# Patient Record
Sex: FEMALE | Race: WHITE | NOT HISPANIC OR LATINO | URBAN - METROPOLITAN AREA
[De-identification: names, ages, dates, MRNs, and addresses within clinical notes are randomized per-mention and may not be internally consistent; named-entity substitution may affect disease eponyms.]

---

## 2017-03-24 ENCOUNTER — OUTPATIENT (OUTPATIENT)
Dept: OUTPATIENT SERVICES | Facility: HOSPITAL | Age: 72
LOS: 1 days | End: 2017-03-24
Payer: MEDICARE

## 2017-03-24 ENCOUNTER — APPOINTMENT (OUTPATIENT)
Dept: RADIOLOGY | Facility: HOSPITAL | Age: 72
End: 2017-03-24

## 2017-03-24 DIAGNOSIS — M72.2 PLANTAR FASCIAL FIBROMATOSIS: Chronic | ICD-10-CM

## 2017-03-24 DIAGNOSIS — Z85.828 PERSONAL HISTORY OF OTHER MALIGNANT NEOPLASM OF SKIN: Chronic | ICD-10-CM

## 2017-03-24 PROCEDURE — 73110 X-RAY EXAM OF WRIST: CPT

## 2017-03-24 PROCEDURE — 70200 X-RAY EXAM OF EYE SOCKETS: CPT | Mod: 26

## 2017-03-24 PROCEDURE — 70200 X-RAY EXAM OF EYE SOCKETS: CPT

## 2017-03-24 PROCEDURE — 73110 X-RAY EXAM OF WRIST: CPT | Mod: 26,RT

## 2020-01-01 ENCOUNTER — INPATIENT (INPATIENT)
Facility: HOSPITAL | Age: 75
LOS: 6 days | Discharge: NURSING, REHAB OR LONG-TERM CARE | DRG: 308 | End: 2020-09-25
Attending: INTERNAL MEDICINE | Admitting: INTERNAL MEDICINE
Payer: MEDICARE

## 2020-01-01 ENCOUNTER — TRANSCRIPTION ENCOUNTER (OUTPATIENT)
Age: 75
End: 2020-01-01

## 2020-01-01 VITALS
DIASTOLIC BLOOD PRESSURE: 68 MMHG | OXYGEN SATURATION: 98 % | HEART RATE: 88 BPM | RESPIRATION RATE: 18 BRPM | SYSTOLIC BLOOD PRESSURE: 110 MMHG | TEMPERATURE: 98 F

## 2020-01-01 VITALS
HEART RATE: 121 BPM | RESPIRATION RATE: 18 BRPM | TEMPERATURE: 98 F | OXYGEN SATURATION: 96 % | DIASTOLIC BLOOD PRESSURE: 72 MMHG | SYSTOLIC BLOOD PRESSURE: 101 MMHG | HEIGHT: 65 IN

## 2020-01-01 DIAGNOSIS — M72.2 PLANTAR FASCIAL FIBROMATOSIS: Chronic | ICD-10-CM

## 2020-01-01 DIAGNOSIS — R41.82 ALTERED MENTAL STATUS, UNSPECIFIED: ICD-10-CM

## 2020-01-01 DIAGNOSIS — E87.2 ACIDOSIS: ICD-10-CM

## 2020-01-01 DIAGNOSIS — Z85.828 PERSONAL HISTORY OF OTHER MALIGNANT NEOPLASM OF SKIN: Chronic | ICD-10-CM

## 2020-01-01 DIAGNOSIS — I48.91 UNSPECIFIED ATRIAL FIBRILLATION: ICD-10-CM

## 2020-01-01 DIAGNOSIS — N17.9 ACUTE KIDNEY FAILURE, UNSPECIFIED: ICD-10-CM

## 2020-01-01 DIAGNOSIS — E87.0 HYPEROSMOLALITY AND HYPERNATREMIA: ICD-10-CM

## 2020-01-01 LAB
ALBUMIN SERPL ELPH-MCNC: 3.5 G/DL — SIGNIFICANT CHANGE UP (ref 3.3–5)
ALP SERPL-CCNC: 74 U/L — SIGNIFICANT CHANGE UP (ref 40–120)
ALT FLD-CCNC: 19 U/L — SIGNIFICANT CHANGE UP (ref 10–45)
ANION GAP SERPL CALC-SCNC: 10 MMOL/L — SIGNIFICANT CHANGE UP (ref 5–17)
ANION GAP SERPL CALC-SCNC: 10 MMOL/L — SIGNIFICANT CHANGE UP (ref 5–17)
ANION GAP SERPL CALC-SCNC: 11 MMOL/L — SIGNIFICANT CHANGE UP (ref 5–17)
ANION GAP SERPL CALC-SCNC: 12 MMOL/L — SIGNIFICANT CHANGE UP (ref 5–17)
ANION GAP SERPL CALC-SCNC: 13 MMOL/L — SIGNIFICANT CHANGE UP (ref 5–17)
ANION GAP SERPL CALC-SCNC: 19 MMOL/L — HIGH (ref 5–17)
ANION GAP SERPL CALC-SCNC: 20 MMOL/L — HIGH (ref 5–17)
ANION GAP SERPL CALC-SCNC: 20 MMOL/L — HIGH (ref 5–17)
ANION GAP SERPL CALC-SCNC: 6 MMOL/L — SIGNIFICANT CHANGE UP (ref 5–17)
ANION GAP SERPL CALC-SCNC: 7 MMOL/L — SIGNIFICANT CHANGE UP (ref 5–17)
ANION GAP SERPL CALC-SCNC: 7 MMOL/L — SIGNIFICANT CHANGE UP (ref 5–17)
ANION GAP SERPL CALC-SCNC: 8 MMOL/L — SIGNIFICANT CHANGE UP (ref 5–17)
APPEARANCE UR: CLEAR — SIGNIFICANT CHANGE UP
APPEARANCE UR: CLEAR — SIGNIFICANT CHANGE UP
APTT BLD: 102 SEC — HIGH (ref 27.5–35.5)
APTT BLD: 119.5 SEC — HIGH (ref 27.5–35.5)
APTT BLD: 128 SEC — CRITICAL HIGH (ref 27.5–35.5)
APTT BLD: 20.1 SEC — LOW (ref 27.5–35.5)
APTT BLD: 24.6 SEC — LOW (ref 27.5–35.5)
APTT BLD: 26.4 SEC — LOW (ref 27.5–35.5)
APTT BLD: 60.1 SEC — HIGH (ref 27.5–35.5)
APTT BLD: 78.2 SEC — HIGH (ref 27.5–35.5)
APTT BLD: 81.6 SEC — HIGH (ref 27.5–35.5)
APTT BLD: 87.7 SEC — HIGH (ref 27.5–35.5)
AST SERPL-CCNC: 25 U/L — SIGNIFICANT CHANGE UP (ref 10–40)
BASOPHILS # BLD AUTO: 0.03 K/UL — SIGNIFICANT CHANGE UP (ref 0–0.2)
BASOPHILS NFR BLD AUTO: 0.2 % — SIGNIFICANT CHANGE UP (ref 0–2)
BILIRUB SERPL-MCNC: 0.4 MG/DL — SIGNIFICANT CHANGE UP (ref 0.2–1.2)
BILIRUB UR-MCNC: ABNORMAL
BILIRUB UR-MCNC: NEGATIVE — SIGNIFICANT CHANGE UP
BUN SERPL-MCNC: 103 MG/DL — HIGH (ref 7–23)
BUN SERPL-MCNC: 15 MG/DL — SIGNIFICANT CHANGE UP (ref 7–23)
BUN SERPL-MCNC: 16 MG/DL — SIGNIFICANT CHANGE UP (ref 7–23)
BUN SERPL-MCNC: 16 MG/DL — SIGNIFICANT CHANGE UP (ref 7–23)
BUN SERPL-MCNC: 26 MG/DL — HIGH (ref 7–23)
BUN SERPL-MCNC: 50 MG/DL — HIGH (ref 7–23)
BUN SERPL-MCNC: 75 MG/DL — HIGH (ref 7–23)
BUN SERPL-MCNC: 80 MG/DL — HIGH (ref 7–23)
BUN SERPL-MCNC: 85 MG/DL — HIGH (ref 7–23)
BUN SERPL-MCNC: 91 MG/DL — HIGH (ref 7–23)
BUN SERPL-MCNC: 96 MG/DL — HIGH (ref 7–23)
BUN SERPL-MCNC: 99 MG/DL — HIGH (ref 7–23)
CALCIUM SERPL-MCNC: 10 MG/DL — SIGNIFICANT CHANGE UP (ref 8.4–10.5)
CALCIUM SERPL-MCNC: 10.4 MG/DL — SIGNIFICANT CHANGE UP (ref 8.4–10.5)
CALCIUM SERPL-MCNC: 10.5 MG/DL — SIGNIFICANT CHANGE UP (ref 8.4–10.5)
CALCIUM SERPL-MCNC: 7.7 MG/DL — LOW (ref 8.4–10.5)
CALCIUM SERPL-MCNC: 8.5 MG/DL — SIGNIFICANT CHANGE UP (ref 8.4–10.5)
CALCIUM SERPL-MCNC: 8.7 MG/DL — SIGNIFICANT CHANGE UP (ref 8.4–10.5)
CALCIUM SERPL-MCNC: 8.8 MG/DL — SIGNIFICANT CHANGE UP (ref 8.4–10.5)
CALCIUM SERPL-MCNC: 9.1 MG/DL — SIGNIFICANT CHANGE UP (ref 8.4–10.5)
CALCIUM SERPL-MCNC: 9.2 MG/DL — SIGNIFICANT CHANGE UP (ref 8.4–10.5)
CALCIUM SERPL-MCNC: 9.3 MG/DL — SIGNIFICANT CHANGE UP (ref 8.4–10.5)
CALCIUM SERPL-MCNC: 9.4 MG/DL — SIGNIFICANT CHANGE UP (ref 8.4–10.5)
CALCIUM SERPL-MCNC: 9.6 MG/DL — SIGNIFICANT CHANGE UP (ref 8.4–10.5)
CHLORIDE SERPL-SCNC: 110 MMOL/L — HIGH (ref 96–108)
CHLORIDE SERPL-SCNC: 113 MMOL/L — HIGH (ref 96–108)
CHLORIDE SERPL-SCNC: 113 MMOL/L — HIGH (ref 96–108)
CHLORIDE SERPL-SCNC: 114 MMOL/L — HIGH (ref 96–108)
CHLORIDE SERPL-SCNC: 116 MMOL/L — HIGH (ref 96–108)
CHLORIDE SERPL-SCNC: 119 MMOL/L — HIGH (ref 96–108)
CHLORIDE SERPL-SCNC: 119 MMOL/L — HIGH (ref 96–108)
CHLORIDE SERPL-SCNC: 123 MMOL/L — HIGH (ref 96–108)
CHLORIDE SERPL-SCNC: 123 MMOL/L — HIGH (ref 96–108)
CHLORIDE SERPL-SCNC: 124 MMOL/L — HIGH (ref 96–108)
CHLORIDE SERPL-SCNC: 126 MMOL/L — HIGH (ref 96–108)
CHLORIDE SERPL-SCNC: 96 MMOL/L — SIGNIFICANT CHANGE UP (ref 96–108)
CK SERPL-CCNC: 169 U/L — SIGNIFICANT CHANGE UP (ref 25–170)
CK SERPL-CCNC: 334 U/L — HIGH (ref 25–170)
CK SERPL-CCNC: 87 U/L — SIGNIFICANT CHANGE UP (ref 25–170)
CO2 SERPL-SCNC: 10 MMOL/L — CRITICAL LOW (ref 22–31)
CO2 SERPL-SCNC: 14 MMOL/L — LOW (ref 22–31)
CO2 SERPL-SCNC: 16 MMOL/L — LOW (ref 22–31)
CO2 SERPL-SCNC: 20 MMOL/L — LOW (ref 22–31)
CO2 SERPL-SCNC: 22 MMOL/L — SIGNIFICANT CHANGE UP (ref 22–31)
CO2 SERPL-SCNC: 23 MMOL/L — SIGNIFICANT CHANGE UP (ref 22–31)
CO2 SERPL-SCNC: 23 MMOL/L — SIGNIFICANT CHANGE UP (ref 22–31)
CO2 SERPL-SCNC: 24 MMOL/L — SIGNIFICANT CHANGE UP (ref 22–31)
CO2 SERPL-SCNC: 26 MMOL/L — SIGNIFICANT CHANGE UP (ref 22–31)
CO2 SERPL-SCNC: 38 MMOL/L — HIGH (ref 22–31)
COLOR SPEC: SIGNIFICANT CHANGE UP
COLOR SPEC: YELLOW — SIGNIFICANT CHANGE UP
CREAT ?TM UR-MCNC: 78 MG/DL — SIGNIFICANT CHANGE UP
CREAT SERPL-MCNC: 1.31 MG/DL — HIGH (ref 0.5–1.3)
CREAT SERPL-MCNC: 1.36 MG/DL — HIGH (ref 0.5–1.3)
CREAT SERPL-MCNC: 1.38 MG/DL — HIGH (ref 0.5–1.3)
CREAT SERPL-MCNC: 1.49 MG/DL — HIGH (ref 0.5–1.3)
CREAT SERPL-MCNC: 1.85 MG/DL — HIGH (ref 0.5–1.3)
CREAT SERPL-MCNC: 2.46 MG/DL — HIGH (ref 0.5–1.3)
CREAT SERPL-MCNC: 2.51 MG/DL — HIGH (ref 0.5–1.3)
CREAT SERPL-MCNC: 2.81 MG/DL — HIGH (ref 0.5–1.3)
CREAT SERPL-MCNC: 2.83 MG/DL — HIGH (ref 0.5–1.3)
CREAT SERPL-MCNC: 3.18 MG/DL — HIGH (ref 0.5–1.3)
CREAT SERPL-MCNC: 3.7 MG/DL — HIGH (ref 0.5–1.3)
CREAT SERPL-MCNC: 3.79 MG/DL — HIGH (ref 0.5–1.3)
CULTURE RESULTS: SIGNIFICANT CHANGE UP
DIFF PNL FLD: NEGATIVE — SIGNIFICANT CHANGE UP
DIFF PNL FLD: SIGNIFICANT CHANGE UP
EOSINOPHIL # BLD AUTO: 0.09 K/UL — SIGNIFICANT CHANGE UP (ref 0–0.5)
EOSINOPHIL NFR BLD AUTO: 0.6 % — SIGNIFICANT CHANGE UP (ref 0–6)
FOLATE SERPL-MCNC: 8.5 NG/ML — SIGNIFICANT CHANGE UP
GAS PNL BLDV: SIGNIFICANT CHANGE UP
GAS PNL BLDV: SIGNIFICANT CHANGE UP
GLUCOSE BLDC GLUCOMTR-MCNC: 105 MG/DL — HIGH (ref 70–99)
GLUCOSE SERPL-MCNC: 106 MG/DL — HIGH (ref 70–99)
GLUCOSE SERPL-MCNC: 110 MG/DL — HIGH (ref 70–99)
GLUCOSE SERPL-MCNC: 113 MG/DL — HIGH (ref 70–99)
GLUCOSE SERPL-MCNC: 113 MG/DL — HIGH (ref 70–99)
GLUCOSE SERPL-MCNC: 122 MG/DL — HIGH (ref 70–99)
GLUCOSE SERPL-MCNC: 122 MG/DL — HIGH (ref 70–99)
GLUCOSE SERPL-MCNC: 138 MG/DL — HIGH (ref 70–99)
GLUCOSE SERPL-MCNC: 144 MG/DL — HIGH (ref 70–99)
GLUCOSE SERPL-MCNC: 145 MG/DL — HIGH (ref 70–99)
GLUCOSE SERPL-MCNC: 149 MG/DL — HIGH (ref 70–99)
GLUCOSE SERPL-MCNC: 223 MG/DL — HIGH (ref 70–99)
GLUCOSE SERPL-MCNC: 980 MG/DL — CRITICAL HIGH (ref 70–99)
GLUCOSE UR QL: NEGATIVE — SIGNIFICANT CHANGE UP
GLUCOSE UR QL: NEGATIVE — SIGNIFICANT CHANGE UP
HCT VFR BLD CALC: 27.7 % — LOW (ref 34.5–45)
HCT VFR BLD CALC: 28.1 % — LOW (ref 34.5–45)
HCT VFR BLD CALC: 28.2 % — LOW (ref 34.5–45)
HCT VFR BLD CALC: 28.6 % — LOW (ref 34.5–45)
HCT VFR BLD CALC: 31 % — LOW (ref 34.5–45)
HCT VFR BLD CALC: 34.2 % — LOW (ref 34.5–45)
HCT VFR BLD CALC: 34.6 % — SIGNIFICANT CHANGE UP (ref 34.5–45)
HCT VFR BLD CALC: 36.6 % — SIGNIFICANT CHANGE UP (ref 34.5–45)
HCT VFR BLD CALC: 41.6 % — SIGNIFICANT CHANGE UP (ref 34.5–45)
HCV AB S/CO SERPL IA: 0.08 S/CO — SIGNIFICANT CHANGE UP (ref 0–0.99)
HCV AB SERPL-IMP: SIGNIFICANT CHANGE UP
HGB BLD-MCNC: 10.2 G/DL — LOW (ref 11.5–15.5)
HGB BLD-MCNC: 10.5 G/DL — LOW (ref 11.5–15.5)
HGB BLD-MCNC: 10.8 G/DL — LOW (ref 11.5–15.5)
HGB BLD-MCNC: 12.5 G/DL — SIGNIFICANT CHANGE UP (ref 11.5–15.5)
HGB BLD-MCNC: 8.4 G/DL — LOW (ref 11.5–15.5)
HGB BLD-MCNC: 8.5 G/DL — LOW (ref 11.5–15.5)
HGB BLD-MCNC: 8.6 G/DL — LOW (ref 11.5–15.5)
HGB BLD-MCNC: 8.8 G/DL — LOW (ref 11.5–15.5)
HGB BLD-MCNC: 9.4 G/DL — LOW (ref 11.5–15.5)
IMM GRANULOCYTES NFR BLD AUTO: 0.6 % — SIGNIFICANT CHANGE UP (ref 0–1.5)
INR BLD: 1.22 RATIO — HIGH (ref 0.88–1.16)
KETONES UR-MCNC: NEGATIVE — SIGNIFICANT CHANGE UP
KETONES UR-MCNC: NEGATIVE — SIGNIFICANT CHANGE UP
LACTATE BLDV-MCNC: 2.9 MMOL/L — HIGH (ref 0.7–2)
LEUKOCYTE ESTERASE UR-ACNC: ABNORMAL
LEUKOCYTE ESTERASE UR-ACNC: NEGATIVE — SIGNIFICANT CHANGE UP
LITHIUM SERPL-MCNC: 0.3 MMOL/L — LOW (ref 0.6–1.2)
LYMPHOCYTES # BLD AUTO: 0.91 K/UL — LOW (ref 1–3.3)
LYMPHOCYTES # BLD AUTO: 6.3 % — LOW (ref 13–44)
MAGNESIUM SERPL-MCNC: 1.8 MG/DL — SIGNIFICANT CHANGE UP (ref 1.6–2.6)
MAGNESIUM SERPL-MCNC: 1.9 MG/DL — SIGNIFICANT CHANGE UP (ref 1.6–2.6)
MAGNESIUM SERPL-MCNC: 2 MG/DL — SIGNIFICANT CHANGE UP (ref 1.6–2.6)
MAGNESIUM SERPL-MCNC: 2.2 MG/DL — SIGNIFICANT CHANGE UP (ref 1.6–2.6)
MAGNESIUM SERPL-MCNC: 2.3 MG/DL — SIGNIFICANT CHANGE UP (ref 1.6–2.6)
MCHC RBC-ENTMCNC: 28.9 PG — SIGNIFICANT CHANGE UP (ref 27–34)
MCHC RBC-ENTMCNC: 29.1 PG — SIGNIFICANT CHANGE UP (ref 27–34)
MCHC RBC-ENTMCNC: 29.2 PG — SIGNIFICANT CHANGE UP (ref 27–34)
MCHC RBC-ENTMCNC: 29.5 GM/DL — LOW (ref 32–36)
MCHC RBC-ENTMCNC: 29.5 PG — SIGNIFICANT CHANGE UP (ref 27–34)
MCHC RBC-ENTMCNC: 29.6 PG — SIGNIFICANT CHANGE UP (ref 27–34)
MCHC RBC-ENTMCNC: 29.7 PG — SIGNIFICANT CHANGE UP (ref 27–34)
MCHC RBC-ENTMCNC: 29.7 PG — SIGNIFICANT CHANGE UP (ref 27–34)
MCHC RBC-ENTMCNC: 29.8 GM/DL — LOW (ref 32–36)
MCHC RBC-ENTMCNC: 29.9 PG — SIGNIFICANT CHANGE UP (ref 27–34)
MCHC RBC-ENTMCNC: 30 GM/DL — LOW (ref 32–36)
MCHC RBC-ENTMCNC: 30.1 GM/DL — LOW (ref 32–36)
MCHC RBC-ENTMCNC: 30.1 PG — SIGNIFICANT CHANGE UP (ref 27–34)
MCHC RBC-ENTMCNC: 30.3 GM/DL — LOW (ref 32–36)
MCHC RBC-ENTMCNC: 30.6 GM/DL — LOW (ref 32–36)
MCHC RBC-ENTMCNC: 30.8 GM/DL — LOW (ref 32–36)
MCV RBC AUTO: 96.9 FL — SIGNIFICANT CHANGE UP (ref 80–100)
MCV RBC AUTO: 97 FL — SIGNIFICANT CHANGE UP (ref 80–100)
MCV RBC AUTO: 97.2 FL — SIGNIFICANT CHANGE UP (ref 80–100)
MCV RBC AUTO: 97.5 FL — SIGNIFICANT CHANGE UP (ref 80–100)
MCV RBC AUTO: 97.6 FL — SIGNIFICANT CHANGE UP (ref 80–100)
MCV RBC AUTO: 97.9 FL — SIGNIFICANT CHANGE UP (ref 80–100)
MCV RBC AUTO: 97.9 FL — SIGNIFICANT CHANGE UP (ref 80–100)
MCV RBC AUTO: 98.1 FL — SIGNIFICANT CHANGE UP (ref 80–100)
MCV RBC AUTO: 99.4 FL — SIGNIFICANT CHANGE UP (ref 80–100)
MONOCYTES # BLD AUTO: 1.14 K/UL — HIGH (ref 0–0.9)
MONOCYTES NFR BLD AUTO: 7.9 % — SIGNIFICANT CHANGE UP (ref 2–14)
NEUTROPHILS # BLD AUTO: 12.2 K/UL — HIGH (ref 1.8–7.4)
NEUTROPHILS NFR BLD AUTO: 84.4 % — HIGH (ref 43–77)
NITRITE UR-MCNC: NEGATIVE — SIGNIFICANT CHANGE UP
NITRITE UR-MCNC: NEGATIVE — SIGNIFICANT CHANGE UP
NRBC # BLD: 0 /100 WBCS — SIGNIFICANT CHANGE UP (ref 0–0)
OSMOLALITY SERPL: 374 MOSMOL/KG — HIGH (ref 280–301)
OSMOLALITY UR: 350 MOSM/KG — SIGNIFICANT CHANGE UP (ref 50–1200)
PH UR: 5.5 — SIGNIFICANT CHANGE UP (ref 5–8)
PH UR: 6 — SIGNIFICANT CHANGE UP (ref 5–8)
PHOSPHATE SERPL-MCNC: 1.1 MG/DL — LOW (ref 2.5–4.5)
PHOSPHATE SERPL-MCNC: 2.4 MG/DL — LOW (ref 2.5–4.5)
PHOSPHATE SERPL-MCNC: 2.5 MG/DL — SIGNIFICANT CHANGE UP (ref 2.5–4.5)
PLATELET # BLD AUTO: 105 K/UL — LOW (ref 150–400)
PLATELET # BLD AUTO: 115 K/UL — LOW (ref 150–400)
PLATELET # BLD AUTO: 116 K/UL — LOW (ref 150–400)
PLATELET # BLD AUTO: 120 K/UL — LOW (ref 150–400)
PLATELET # BLD AUTO: 121 K/UL — LOW (ref 150–400)
PLATELET # BLD AUTO: 128 K/UL — LOW (ref 150–400)
PLATELET # BLD AUTO: 153 K/UL — SIGNIFICANT CHANGE UP (ref 150–400)
PLATELET # BLD AUTO: 183 K/UL — SIGNIFICANT CHANGE UP (ref 150–400)
PLATELET # BLD AUTO: 242 K/UL — SIGNIFICANT CHANGE UP (ref 150–400)
POTASSIUM SERPL-MCNC: 2.4 MMOL/L — CRITICAL LOW (ref 3.5–5.3)
POTASSIUM SERPL-MCNC: 3.2 MMOL/L — LOW (ref 3.5–5.3)
POTASSIUM SERPL-MCNC: 3.3 MMOL/L — LOW (ref 3.5–5.3)
POTASSIUM SERPL-MCNC: 3.3 MMOL/L — LOW (ref 3.5–5.3)
POTASSIUM SERPL-MCNC: 3.5 MMOL/L — SIGNIFICANT CHANGE UP (ref 3.5–5.3)
POTASSIUM SERPL-MCNC: 3.6 MMOL/L — SIGNIFICANT CHANGE UP (ref 3.5–5.3)
POTASSIUM SERPL-MCNC: 3.7 MMOL/L — SIGNIFICANT CHANGE UP (ref 3.5–5.3)
POTASSIUM SERPL-MCNC: 3.7 MMOL/L — SIGNIFICANT CHANGE UP (ref 3.5–5.3)
POTASSIUM SERPL-MCNC: 3.9 MMOL/L — SIGNIFICANT CHANGE UP (ref 3.5–5.3)
POTASSIUM SERPL-MCNC: 5 MMOL/L — SIGNIFICANT CHANGE UP (ref 3.5–5.3)
POTASSIUM SERPL-MCNC: 5.2 MMOL/L — SIGNIFICANT CHANGE UP (ref 3.5–5.3)
POTASSIUM SERPL-MCNC: 5.3 MMOL/L — SIGNIFICANT CHANGE UP (ref 3.5–5.3)
POTASSIUM SERPL-SCNC: 2.4 MMOL/L — CRITICAL LOW (ref 3.5–5.3)
POTASSIUM SERPL-SCNC: 3.2 MMOL/L — LOW (ref 3.5–5.3)
POTASSIUM SERPL-SCNC: 3.3 MMOL/L — LOW (ref 3.5–5.3)
POTASSIUM SERPL-SCNC: 3.3 MMOL/L — LOW (ref 3.5–5.3)
POTASSIUM SERPL-SCNC: 3.5 MMOL/L — SIGNIFICANT CHANGE UP (ref 3.5–5.3)
POTASSIUM SERPL-SCNC: 3.6 MMOL/L — SIGNIFICANT CHANGE UP (ref 3.5–5.3)
POTASSIUM SERPL-SCNC: 3.7 MMOL/L — SIGNIFICANT CHANGE UP (ref 3.5–5.3)
POTASSIUM SERPL-SCNC: 3.7 MMOL/L — SIGNIFICANT CHANGE UP (ref 3.5–5.3)
POTASSIUM SERPL-SCNC: 3.9 MMOL/L — SIGNIFICANT CHANGE UP (ref 3.5–5.3)
POTASSIUM SERPL-SCNC: 5 MMOL/L — SIGNIFICANT CHANGE UP (ref 3.5–5.3)
POTASSIUM SERPL-SCNC: 5.2 MMOL/L — SIGNIFICANT CHANGE UP (ref 3.5–5.3)
POTASSIUM SERPL-SCNC: 5.3 MMOL/L — SIGNIFICANT CHANGE UP (ref 3.5–5.3)
POTASSIUM UR-SCNC: 28 MMOL/L — SIGNIFICANT CHANGE UP
PROT ?TM UR-MCNC: 15 MG/DL — HIGH (ref 0–12)
PROT SERPL-MCNC: 6.7 G/DL — SIGNIFICANT CHANGE UP (ref 6–8.3)
PROT UR-MCNC: ABNORMAL
PROT UR-MCNC: SIGNIFICANT CHANGE UP
PROT/CREAT UR-RTO: 0.2 RATIO — SIGNIFICANT CHANGE UP (ref 0–0.2)
PROTHROM AB SERPL-ACNC: 14.4 SEC — HIGH (ref 10.6–13.6)
RAPID RVP RESULT: SIGNIFICANT CHANGE UP
RBC # BLD: 2.84 M/UL — LOW (ref 3.8–5.2)
RBC # BLD: 2.88 M/UL — LOW (ref 3.8–5.2)
RBC # BLD: 2.91 M/UL — LOW (ref 3.8–5.2)
RBC # BLD: 2.92 M/UL — LOW (ref 3.8–5.2)
RBC # BLD: 3.16 M/UL — LOW (ref 3.8–5.2)
RBC # BLD: 3.44 M/UL — LOW (ref 3.8–5.2)
RBC # BLD: 3.56 M/UL — LOW (ref 3.8–5.2)
RBC # BLD: 3.74 M/UL — LOW (ref 3.8–5.2)
RBC # BLD: 4.29 M/UL — SIGNIFICANT CHANGE UP (ref 3.8–5.2)
RBC # FLD: 14.3 % — SIGNIFICANT CHANGE UP (ref 10.3–14.5)
RBC # FLD: 14.5 % — SIGNIFICANT CHANGE UP (ref 10.3–14.5)
RBC # FLD: 14.6 % — HIGH (ref 10.3–14.5)
RBC # FLD: 14.7 % — HIGH (ref 10.3–14.5)
RBC # FLD: 14.8 % — HIGH (ref 10.3–14.5)
RBC # FLD: 14.8 % — HIGH (ref 10.3–14.5)
RBC # FLD: 14.9 % — HIGH (ref 10.3–14.5)
RBC # FLD: 15 % — HIGH (ref 10.3–14.5)
RBC # FLD: 15.1 % — HIGH (ref 10.3–14.5)
SARS-COV-2 IGG SERPL QL IA: NEGATIVE — SIGNIFICANT CHANGE UP
SARS-COV-2 IGM SERPL IA-ACNC: <0.1 INDEX — SIGNIFICANT CHANGE UP
SARS-COV-2 RNA SPEC QL NAA+PROBE: SIGNIFICANT CHANGE UP
SODIUM SERPL-SCNC: 140 MMOL/L — SIGNIFICANT CHANGE UP (ref 135–145)
SODIUM SERPL-SCNC: 143 MMOL/L — SIGNIFICANT CHANGE UP (ref 135–145)
SODIUM SERPL-SCNC: 143 MMOL/L — SIGNIFICANT CHANGE UP (ref 135–145)
SODIUM SERPL-SCNC: 147 MMOL/L — HIGH (ref 135–145)
SODIUM SERPL-SCNC: 149 MMOL/L — HIGH (ref 135–145)
SODIUM SERPL-SCNC: 149 MMOL/L — HIGH (ref 135–145)
SODIUM SERPL-SCNC: 150 MMOL/L — HIGH (ref 135–145)
SODIUM SERPL-SCNC: 151 MMOL/L — HIGH (ref 135–145)
SODIUM SERPL-SCNC: 154 MMOL/L — HIGH (ref 135–145)
SODIUM SERPL-SCNC: 158 MMOL/L — HIGH (ref 135–145)
SODIUM SERPL-SCNC: 159 MMOL/L — HIGH (ref 135–145)
SODIUM SERPL-SCNC: 160 MMOL/L — CRITICAL HIGH (ref 135–145)
SODIUM UR-SCNC: <35 MMOL/L — SIGNIFICANT CHANGE UP
SP GR SPEC: 1.01 — SIGNIFICANT CHANGE UP (ref 1.01–1.02)
SP GR SPEC: 1.02 — SIGNIFICANT CHANGE UP (ref 1.01–1.02)
SPECIMEN SOURCE: SIGNIFICANT CHANGE UP
TROPONIN T, HIGH SENSITIVITY RESULT: 101 NG/L — HIGH (ref 0–51)
TSH SERPL-MCNC: 1.5 UIU/ML — SIGNIFICANT CHANGE UP (ref 0.27–4.2)
UROBILINOGEN FLD QL: ABNORMAL
UROBILINOGEN FLD QL: SIGNIFICANT CHANGE UP
UUN UR-MCNC: 566 MG/DL — SIGNIFICANT CHANGE UP
VIT B12 SERPL-MCNC: 790 PG/ML — SIGNIFICANT CHANGE UP (ref 232–1245)
VIT B12 SERPL-MCNC: 850 PG/ML — SIGNIFICANT CHANGE UP (ref 232–1245)
WBC # BLD: 10.33 K/UL — SIGNIFICANT CHANGE UP (ref 3.8–10.5)
WBC # BLD: 11.34 K/UL — HIGH (ref 3.8–10.5)
WBC # BLD: 11.35 K/UL — HIGH (ref 3.8–10.5)
WBC # BLD: 14.46 K/UL — HIGH (ref 3.8–10.5)
WBC # BLD: 6.96 K/UL — SIGNIFICANT CHANGE UP (ref 3.8–10.5)
WBC # BLD: 7.01 K/UL — SIGNIFICANT CHANGE UP (ref 3.8–10.5)
WBC # BLD: 8.46 K/UL — SIGNIFICANT CHANGE UP (ref 3.8–10.5)
WBC # BLD: 8.65 K/UL — SIGNIFICANT CHANGE UP (ref 3.8–10.5)
WBC # BLD: 9.88 K/UL — SIGNIFICANT CHANGE UP (ref 3.8–10.5)
WBC # FLD AUTO: 10.33 K/UL — SIGNIFICANT CHANGE UP (ref 3.8–10.5)
WBC # FLD AUTO: 11.34 K/UL — HIGH (ref 3.8–10.5)
WBC # FLD AUTO: 11.35 K/UL — HIGH (ref 3.8–10.5)
WBC # FLD AUTO: 14.46 K/UL — HIGH (ref 3.8–10.5)
WBC # FLD AUTO: 6.96 K/UL — SIGNIFICANT CHANGE UP (ref 3.8–10.5)
WBC # FLD AUTO: 7.01 K/UL — SIGNIFICANT CHANGE UP (ref 3.8–10.5)
WBC # FLD AUTO: 8.46 K/UL — SIGNIFICANT CHANGE UP (ref 3.8–10.5)
WBC # FLD AUTO: 8.65 K/UL — SIGNIFICANT CHANGE UP (ref 3.8–10.5)
WBC # FLD AUTO: 9.88 K/UL — SIGNIFICANT CHANGE UP (ref 3.8–10.5)

## 2020-01-01 PROCEDURE — 71045 X-RAY EXAM CHEST 1 VIEW: CPT | Mod: 26

## 2020-01-01 PROCEDURE — 93306 TTE W/DOPPLER COMPLETE: CPT | Mod: 26

## 2020-01-01 PROCEDURE — 93010 ELECTROCARDIOGRAM REPORT: CPT | Mod: 76

## 2020-01-01 PROCEDURE — 84295 ASSAY OF SERUM SODIUM: CPT

## 2020-01-01 PROCEDURE — 82962 GLUCOSE BLOOD TEST: CPT

## 2020-01-01 PROCEDURE — 82746 ASSAY OF FOLIC ACID SERUM: CPT

## 2020-01-01 PROCEDURE — 70450 CT HEAD/BRAIN W/O DYE: CPT | Mod: 26

## 2020-01-01 PROCEDURE — 82565 ASSAY OF CREATININE: CPT

## 2020-01-01 PROCEDURE — 83930 ASSAY OF BLOOD OSMOLALITY: CPT

## 2020-01-01 PROCEDURE — 97161 PT EVAL LOW COMPLEX 20 MIN: CPT

## 2020-01-01 PROCEDURE — 82435 ASSAY OF BLOOD CHLORIDE: CPT

## 2020-01-01 PROCEDURE — 82803 BLOOD GASES ANY COMBINATION: CPT

## 2020-01-01 PROCEDURE — 87086 URINE CULTURE/COLONY COUNT: CPT

## 2020-01-01 PROCEDURE — 84100 ASSAY OF PHOSPHORUS: CPT

## 2020-01-01 PROCEDURE — 86769 SARS-COV-2 COVID-19 ANTIBODY: CPT

## 2020-01-01 PROCEDURE — 84133 ASSAY OF URINE POTASSIUM: CPT

## 2020-01-01 PROCEDURE — 84132 ASSAY OF SERUM POTASSIUM: CPT

## 2020-01-01 PROCEDURE — 71045 X-RAY EXAM CHEST 1 VIEW: CPT

## 2020-01-01 PROCEDURE — 84300 ASSAY OF URINE SODIUM: CPT

## 2020-01-01 PROCEDURE — 85027 COMPLETE CBC AUTOMATED: CPT

## 2020-01-01 PROCEDURE — 93306 TTE W/DOPPLER COMPLETE: CPT

## 2020-01-01 PROCEDURE — 84156 ASSAY OF PROTEIN URINE: CPT

## 2020-01-01 PROCEDURE — 85014 HEMATOCRIT: CPT

## 2020-01-01 PROCEDURE — 76770 US EXAM ABDO BACK WALL COMP: CPT

## 2020-01-01 PROCEDURE — 84540 ASSAY OF URINE/UREA-N: CPT

## 2020-01-01 PROCEDURE — 99232 SBSQ HOSP IP/OBS MODERATE 35: CPT

## 2020-01-01 PROCEDURE — 93010 ELECTROCARDIOGRAM REPORT: CPT

## 2020-01-01 PROCEDURE — 96375 TX/PRO/DX INJ NEW DRUG ADDON: CPT

## 2020-01-01 PROCEDURE — 80048 BASIC METABOLIC PNL TOTAL CA: CPT

## 2020-01-01 PROCEDURE — 85730 THROMBOPLASTIN TIME PARTIAL: CPT

## 2020-01-01 PROCEDURE — 0225U NFCT DS DNA&RNA 21 SARSCOV2: CPT

## 2020-01-01 PROCEDURE — 70450 CT HEAD/BRAIN W/O DYE: CPT

## 2020-01-01 PROCEDURE — 82607 VITAMIN B-12: CPT

## 2020-01-01 PROCEDURE — 85018 HEMOGLOBIN: CPT

## 2020-01-01 PROCEDURE — 83605 ASSAY OF LACTIC ACID: CPT

## 2020-01-01 PROCEDURE — 80053 COMPREHEN METABOLIC PANEL: CPT

## 2020-01-01 PROCEDURE — 82570 ASSAY OF URINE CREATININE: CPT

## 2020-01-01 PROCEDURE — 92610 EVALUATE SWALLOWING FUNCTION: CPT

## 2020-01-01 PROCEDURE — 76770 US EXAM ABDO BACK WALL COMP: CPT | Mod: 26

## 2020-01-01 PROCEDURE — 87040 BLOOD CULTURE FOR BACTERIA: CPT

## 2020-01-01 PROCEDURE — 84484 ASSAY OF TROPONIN QUANT: CPT

## 2020-01-01 PROCEDURE — 99291 CRITICAL CARE FIRST HOUR: CPT | Mod: 25

## 2020-01-01 PROCEDURE — 96374 THER/PROPH/DIAG INJ IV PUSH: CPT

## 2020-01-01 PROCEDURE — 93005 ELECTROCARDIOGRAM TRACING: CPT

## 2020-01-01 PROCEDURE — 81001 URINALYSIS AUTO W/SCOPE: CPT

## 2020-01-01 PROCEDURE — 97110 THERAPEUTIC EXERCISES: CPT

## 2020-01-01 PROCEDURE — 85025 COMPLETE CBC W/AUTO DIFF WBC: CPT

## 2020-01-01 PROCEDURE — 82947 ASSAY GLUCOSE BLOOD QUANT: CPT

## 2020-01-01 PROCEDURE — 82550 ASSAY OF CK (CPK): CPT

## 2020-01-01 PROCEDURE — 82330 ASSAY OF CALCIUM: CPT

## 2020-01-01 PROCEDURE — 86803 HEPATITIS C AB TEST: CPT

## 2020-01-01 PROCEDURE — 85610 PROTHROMBIN TIME: CPT

## 2020-01-01 PROCEDURE — 83935 ASSAY OF URINE OSMOLALITY: CPT

## 2020-01-01 PROCEDURE — 80178 ASSAY OF LITHIUM: CPT

## 2020-01-01 PROCEDURE — 97530 THERAPEUTIC ACTIVITIES: CPT

## 2020-01-01 PROCEDURE — 83735 ASSAY OF MAGNESIUM: CPT

## 2020-01-01 PROCEDURE — 99291 CRITICAL CARE FIRST HOUR: CPT | Mod: GC

## 2020-01-01 PROCEDURE — 84443 ASSAY THYROID STIM HORMONE: CPT

## 2020-01-01 RX ORDER — MIRTAZAPINE 45 MG/1
15 TABLET, ORALLY DISINTEGRATING ORAL AT BEDTIME
Refills: 0 | Status: DISCONTINUED | OUTPATIENT
Start: 2020-01-01 | End: 2020-01-01

## 2020-01-01 RX ORDER — METOPROLOL TARTRATE 50 MG
25 TABLET ORAL DAILY
Refills: 0 | Status: DISCONTINUED | OUTPATIENT
Start: 2020-01-01 | End: 2020-01-01

## 2020-01-01 RX ORDER — HEPARIN SODIUM 5000 [USP'U]/ML
5000 INJECTION INTRAVENOUS; SUBCUTANEOUS EVERY 6 HOURS
Refills: 0 | Status: DISCONTINUED | OUTPATIENT
Start: 2020-01-01 | End: 2020-01-01

## 2020-01-01 RX ORDER — DILTIAZEM HCL 120 MG
10 CAPSULE, EXT RELEASE 24 HR ORAL ONCE
Refills: 0 | Status: COMPLETED | OUTPATIENT
Start: 2020-01-01 | End: 2020-01-01

## 2020-01-01 RX ORDER — SODIUM CHLORIDE 9 MG/ML
1000 INJECTION, SOLUTION INTRAVENOUS
Refills: 0 | Status: DISCONTINUED | OUTPATIENT
Start: 2020-01-01 | End: 2020-01-01

## 2020-01-01 RX ORDER — HEPARIN SODIUM 5000 [USP'U]/ML
800 INJECTION INTRAVENOUS; SUBCUTANEOUS
Qty: 25000 | Refills: 0 | Status: DISCONTINUED | OUTPATIENT
Start: 2020-01-01 | End: 2020-01-01

## 2020-01-01 RX ORDER — SODIUM CHLORIDE 9 MG/ML
2000 INJECTION INTRAMUSCULAR; INTRAVENOUS; SUBCUTANEOUS ONCE
Refills: 0 | Status: COMPLETED | OUTPATIENT
Start: 2020-01-01 | End: 2020-01-01

## 2020-01-01 RX ORDER — CALCIUM GLUCONATE 100 MG/ML
1 VIAL (ML) INTRAVENOUS ONCE
Refills: 0 | Status: COMPLETED | OUTPATIENT
Start: 2020-01-01 | End: 2020-01-01

## 2020-01-01 RX ORDER — APIXABAN 2.5 MG/1
1 TABLET, FILM COATED ORAL
Qty: 60 | Refills: 0
Start: 2020-01-01 | End: 2020-01-01

## 2020-01-01 RX ORDER — ONDANSETRON 8 MG/1
4 TABLET, FILM COATED ORAL ONCE
Refills: 0 | Status: COMPLETED | OUTPATIENT
Start: 2020-01-01 | End: 2020-01-01

## 2020-01-01 RX ORDER — ONDANSETRON 8 MG/1
4 TABLET, FILM COATED ORAL EVERY 8 HOURS
Refills: 0 | Status: DISCONTINUED | OUTPATIENT
Start: 2020-01-01 | End: 2020-01-01

## 2020-01-01 RX ORDER — HEPARIN SODIUM 5000 [USP'U]/ML
INJECTION INTRAVENOUS; SUBCUTANEOUS
Qty: 25000 | Refills: 0 | Status: DISCONTINUED | OUTPATIENT
Start: 2020-01-01 | End: 2020-01-01

## 2020-01-01 RX ORDER — ASPIRIN/CALCIUM CARB/MAGNESIUM 324 MG
81 TABLET ORAL DAILY
Refills: 0 | Status: DISCONTINUED | OUTPATIENT
Start: 2020-01-01 | End: 2020-01-01

## 2020-01-01 RX ORDER — DILTIAZEM HCL 120 MG
5 CAPSULE, EXT RELEASE 24 HR ORAL
Qty: 125 | Refills: 0 | Status: DISCONTINUED | OUTPATIENT
Start: 2020-01-01 | End: 2020-01-01

## 2020-01-01 RX ORDER — HEPARIN SODIUM 5000 [USP'U]/ML
2500 INJECTION INTRAVENOUS; SUBCUTANEOUS EVERY 6 HOURS
Refills: 0 | Status: DISCONTINUED | OUTPATIENT
Start: 2020-01-01 | End: 2020-01-01

## 2020-01-01 RX ORDER — MAGNESIUM SULFATE 500 MG/ML
1 VIAL (ML) INJECTION ONCE
Refills: 0 | Status: COMPLETED | OUTPATIENT
Start: 2020-01-01 | End: 2020-01-01

## 2020-01-01 RX ORDER — ARIPIPRAZOLE 15 MG/1
1 TABLET ORAL
Qty: 0 | Refills: 0 | DISCHARGE
Start: 2020-01-01

## 2020-01-01 RX ORDER — HALOPERIDOL DECANOATE 100 MG/ML
1 INJECTION INTRAMUSCULAR EVERY 6 HOURS
Refills: 0 | Status: DISCONTINUED | OUTPATIENT
Start: 2020-01-01 | End: 2020-01-01

## 2020-01-01 RX ORDER — APIXABAN 2.5 MG/1
5 TABLET, FILM COATED ORAL
Refills: 0 | Status: DISCONTINUED | OUTPATIENT
Start: 2020-01-01 | End: 2020-01-01

## 2020-01-01 RX ORDER — POTASSIUM CHLORIDE 20 MEQ
40 PACKET (EA) ORAL EVERY 4 HOURS
Refills: 0 | Status: COMPLETED | OUTPATIENT
Start: 2020-01-01 | End: 2020-01-01

## 2020-01-01 RX ORDER — POTASSIUM CHLORIDE 20 MEQ
10 PACKET (EA) ORAL ONCE
Refills: 0 | Status: COMPLETED | OUTPATIENT
Start: 2020-01-01 | End: 2020-01-01

## 2020-01-01 RX ORDER — POTASSIUM CHLORIDE 20 MEQ
40 PACKET (EA) ORAL ONCE
Refills: 0 | Status: COMPLETED | OUTPATIENT
Start: 2020-01-01 | End: 2020-01-01

## 2020-01-01 RX ORDER — ARIPIPRAZOLE 15 MG/1
5 TABLET ORAL DAILY
Refills: 0 | Status: DISCONTINUED | OUTPATIENT
Start: 2020-01-01 | End: 2020-01-01

## 2020-01-01 RX ORDER — MIRTAZAPINE 45 MG/1
1 TABLET, ORALLY DISINTEGRATING ORAL
Qty: 0 | Refills: 0 | DISCHARGE
Start: 2020-01-01

## 2020-01-01 RX ADMIN — Medication 200 GRAM(S): at 13:16

## 2020-01-01 RX ADMIN — ONDANSETRON 4 MILLIGRAM(S): 8 TABLET, FILM COATED ORAL at 05:32

## 2020-01-01 RX ADMIN — HEPARIN SODIUM 800 UNIT(S)/HR: 5000 INJECTION INTRAVENOUS; SUBCUTANEOUS at 22:32

## 2020-01-01 RX ADMIN — Medication 40 MILLIEQUIVALENT(S): at 11:31

## 2020-01-01 RX ADMIN — Medication 25 MILLIGRAM(S): at 05:19

## 2020-01-01 RX ADMIN — HEPARIN SODIUM 1000 UNIT(S)/HR: 5000 INJECTION INTRAVENOUS; SUBCUTANEOUS at 14:38

## 2020-01-01 RX ADMIN — Medication 30 MILLILITER(S): at 18:01

## 2020-01-01 RX ADMIN — ARIPIPRAZOLE 5 MILLIGRAM(S): 15 TABLET ORAL at 12:03

## 2020-01-01 RX ADMIN — Medication 81 MILLIGRAM(S): at 11:03

## 2020-01-01 RX ADMIN — Medication 25 MILLIGRAM(S): at 06:05

## 2020-01-01 RX ADMIN — HEPARIN SODIUM 700 UNIT(S)/HR: 5000 INJECTION INTRAVENOUS; SUBCUTANEOUS at 22:18

## 2020-01-01 RX ADMIN — Medication 10 MILLIGRAM(S): at 15:11

## 2020-01-01 RX ADMIN — Medication 25 MILLIGRAM(S): at 05:50

## 2020-01-01 RX ADMIN — HEPARIN SODIUM 900 UNIT(S)/HR: 5000 INJECTION INTRAVENOUS; SUBCUTANEOUS at 21:55

## 2020-01-01 RX ADMIN — Medication 100 GRAM(S): at 11:30

## 2020-01-01 RX ADMIN — SODIUM CHLORIDE 100 MILLILITER(S): 9 INJECTION, SOLUTION INTRAVENOUS at 16:47

## 2020-01-01 RX ADMIN — Medication 81 MILLIGRAM(S): at 12:13

## 2020-01-01 RX ADMIN — SODIUM CHLORIDE 100 MILLILITER(S): 9 INJECTION, SOLUTION INTRAVENOUS at 18:19

## 2020-01-01 RX ADMIN — APIXABAN 5 MILLIGRAM(S): 2.5 TABLET, FILM COATED ORAL at 17:23

## 2020-01-01 RX ADMIN — ONDANSETRON 4 MILLIGRAM(S): 8 TABLET, FILM COATED ORAL at 10:53

## 2020-01-01 RX ADMIN — ONDANSETRON 4 MILLIGRAM(S): 8 TABLET, FILM COATED ORAL at 21:21

## 2020-01-01 RX ADMIN — Medication 25 MILLIGRAM(S): at 05:25

## 2020-01-01 RX ADMIN — Medication 10 MILLIGRAM(S): at 16:08

## 2020-01-01 RX ADMIN — HEPARIN SODIUM 1100 UNIT(S)/HR: 5000 INJECTION INTRAVENOUS; SUBCUTANEOUS at 23:54

## 2020-01-01 RX ADMIN — Medication 10 MILLIGRAM(S): at 14:45

## 2020-01-01 RX ADMIN — Medication 25 MILLIGRAM(S): at 05:54

## 2020-01-01 RX ADMIN — SODIUM CHLORIDE 80 MILLILITER(S): 9 INJECTION, SOLUTION INTRAVENOUS at 17:59

## 2020-01-01 RX ADMIN — Medication 81 MILLIGRAM(S): at 17:19

## 2020-01-01 RX ADMIN — Medication 30 MILLILITER(S): at 12:02

## 2020-01-01 RX ADMIN — ARIPIPRAZOLE 5 MILLIGRAM(S): 15 TABLET ORAL at 11:46

## 2020-01-01 RX ADMIN — SODIUM CHLORIDE 100 MILLILITER(S): 9 INJECTION, SOLUTION INTRAVENOUS at 15:14

## 2020-01-01 RX ADMIN — APIXABAN 5 MILLIGRAM(S): 2.5 TABLET, FILM COATED ORAL at 05:25

## 2020-01-01 RX ADMIN — Medication 40 MILLIEQUIVALENT(S): at 14:47

## 2020-01-01 RX ADMIN — APIXABAN 5 MILLIGRAM(S): 2.5 TABLET, FILM COATED ORAL at 05:50

## 2020-01-01 RX ADMIN — Medication 5 MG/HR: at 20:39

## 2020-01-01 RX ADMIN — ONDANSETRON 4 MILLIGRAM(S): 8 TABLET, FILM COATED ORAL at 08:40

## 2020-01-01 RX ADMIN — Medication 30 MILLILITER(S): at 02:11

## 2020-01-01 RX ADMIN — SODIUM CHLORIDE 75 MILLILITER(S): 9 INJECTION, SOLUTION INTRAVENOUS at 22:13

## 2020-01-01 RX ADMIN — SODIUM CHLORIDE 80 MILLILITER(S): 9 INJECTION, SOLUTION INTRAVENOUS at 23:07

## 2020-01-01 RX ADMIN — SODIUM CHLORIDE 2000 MILLILITER(S): 9 INJECTION INTRAMUSCULAR; INTRAVENOUS; SUBCUTANEOUS at 12:58

## 2020-01-01 RX ADMIN — Medication 30 MILLILITER(S): at 09:12

## 2020-01-01 RX ADMIN — MIRTAZAPINE 15 MILLIGRAM(S): 45 TABLET, ORALLY DISINTEGRATING ORAL at 21:21

## 2020-01-01 RX ADMIN — Medication 81 MILLIGRAM(S): at 12:03

## 2020-01-01 RX ADMIN — ARIPIPRAZOLE 5 MILLIGRAM(S): 15 TABLET ORAL at 17:19

## 2020-01-01 RX ADMIN — SODIUM CHLORIDE 75 MILLILITER(S): 9 INJECTION, SOLUTION INTRAVENOUS at 20:09

## 2020-01-01 RX ADMIN — APIXABAN 5 MILLIGRAM(S): 2.5 TABLET, FILM COATED ORAL at 05:22

## 2020-01-01 RX ADMIN — HEPARIN SODIUM 700 UNIT(S)/HR: 5000 INJECTION INTRAVENOUS; SUBCUTANEOUS at 06:23

## 2020-01-01 RX ADMIN — ONDANSETRON 4 MILLIGRAM(S): 8 TABLET, FILM COATED ORAL at 12:08

## 2020-01-01 RX ADMIN — ONDANSETRON 4 MILLIGRAM(S): 8 TABLET, FILM COATED ORAL at 22:56

## 2020-01-01 RX ADMIN — Medication 40 MILLIEQUIVALENT(S): at 23:08

## 2020-01-01 RX ADMIN — Medication 62.5 MILLIMOLE(S): at 16:10

## 2020-01-01 RX ADMIN — HEPARIN SODIUM 700 UNIT(S)/HR: 5000 INJECTION INTRAVENOUS; SUBCUTANEOUS at 08:37

## 2020-01-01 RX ADMIN — APIXABAN 5 MILLIGRAM(S): 2.5 TABLET, FILM COATED ORAL at 17:20

## 2020-01-01 RX ADMIN — Medication 81 MILLIGRAM(S): at 12:06

## 2020-01-01 RX ADMIN — Medication 81 MILLIGRAM(S): at 11:46

## 2020-01-01 RX ADMIN — MIRTAZAPINE 15 MILLIGRAM(S): 45 TABLET, ORALLY DISINTEGRATING ORAL at 21:33

## 2020-01-01 RX ADMIN — SODIUM CHLORIDE 75 MILLILITER(S): 9 INJECTION, SOLUTION INTRAVENOUS at 12:23

## 2020-01-01 RX ADMIN — Medication 81 MILLIGRAM(S): at 11:26

## 2020-01-01 RX ADMIN — HEPARIN SODIUM 1100 UNIT(S)/HR: 5000 INJECTION INTRAVENOUS; SUBCUTANEOUS at 07:00

## 2020-01-01 RX ADMIN — SODIUM CHLORIDE 100 MILLILITER(S): 9 INJECTION, SOLUTION INTRAVENOUS at 22:18

## 2020-01-01 RX ADMIN — HEPARIN SODIUM 700 UNIT(S)/HR: 5000 INJECTION INTRAVENOUS; SUBCUTANEOUS at 15:12

## 2020-01-01 RX ADMIN — SODIUM CHLORIDE 80 MILLILITER(S): 9 INJECTION, SOLUTION INTRAVENOUS at 05:06

## 2020-01-01 RX ADMIN — Medication 25 MILLIGRAM(S): at 05:04

## 2020-01-01 RX ADMIN — Medication 100 MILLIEQUIVALENT(S): at 15:12

## 2020-01-01 RX ADMIN — Medication 25 MILLIGRAM(S): at 05:22

## 2020-01-01 RX ADMIN — ARIPIPRAZOLE 5 MILLIGRAM(S): 15 TABLET ORAL at 11:03

## 2020-09-18 NOTE — ED PROVIDER NOTE - OBJECTIVE STATEMENT
Patient is 78 yo who was found on floor by EMS. neibor called because mail was piling up. Dog in house with feces all over. Unknown down time but seems long. Patient speaking but not in response to questions. Looks very dry. No obvious painful or deformed parts. Patient with hx of bipolar. on Li and buproprion.  Patient had expressed sadness and said "if it was not for my dog I would kill myself" to neighbor recently.

## 2020-09-18 NOTE — H&P ADULT - REASON FOR ADMISSION
The patient is a 75y Female was BIBA because of AMS The patient is a 75y Female was BIBA because of AMS.

## 2020-09-18 NOTE — ED ADULT NURSE NOTE - NSIMPLEMENTINTERV_GEN_ALL_ED
Implemented All Fall with Harm Risk Interventions:  [place] to call system. Call bell, personal items and telephone within reach. Instruct patient to call for assistance. Room bathroom lighting operational. Non-slip footwear when patient is off stretcher. Physically safe environment: no spills, clutter or unnecessary equipment. Stretcher in lowest position, wheels locked, appropriate side rails in place. Provide visual cue, wrist band, yellow gown, etc. Monitor gait and stability. Monitor for mental status changes and reorient to person, place, and time. Review medications for side effects contributing to fall risk. Reinforce activity limits and safety measures with patient and family. Provide visual clues: red socks.

## 2020-09-18 NOTE — CONSULT NOTE ADULT - ATTENDING COMMENTS
Thank you for allowing me to participate in the care of your patient    For any question, call:  Cell # [phone]  Pager # [phone]  Callback # [phone]

## 2020-09-18 NOTE — ED PROVIDER NOTE - FAMILY HISTORY
Father  Still living? Unknown  Family history of acute myocardial infarction, Age at diagnosis: Age Unknown

## 2020-09-18 NOTE — ED PROVIDER NOTE - CLINICAL SUMMARY MEDICAL DECISION MAKING FREE TEXT BOX
Ginette: found down at home confused. seems to have been down for days. hx of bipolar. on LI. no obvious head trauma but not communicating. will hydrate ad check labs need to ru infection.

## 2020-09-18 NOTE — H&P ADULT - ASSESSMENT
The patient is a 75y Female was BIBA because of AMS.    A Fib with RVR:    Tele  Cardio eval appreciated.  ECHO  ASA  IV Cardizem  Cw Metoprolol    Metabolic encephalopathy :  Sec to Hypernatremia/MAYELIN  IVF  Serial BMP  Nephro eval called.

## 2020-09-18 NOTE — ED PROVIDER NOTE - ATTENDING CONTRIBUTION TO CARE
I performed a history and physical exam of the patient and discussed their management with the resident and /or advanced care provider. I reviewed the resident and /or ACP's note and agree with the documented findings and plan of care. My medical decision making and observations are found above.  Lungs clear, responsive and speaking but only a little,

## 2020-09-18 NOTE — H&P ADULT - NSICDXFAMILYHX_GEN_ALL_CORE_FT
FAMILY HISTORY:  Father  Still living? Unknown  Family history of acute myocardial infarction, Age at diagnosis: Age Unknown

## 2020-09-18 NOTE — CONSULT NOTE ADULT - SUBJECTIVE AND OBJECTIVE BOX
HISTORY OF PRESENT ILLNESS: HPI:  Patient is 78 yo who was found on floor by EMS. neighbor  called because mail was piling up. Dog in house with feces all over. Unknown down time but seems long. Patient speaking but not in response to questions. Looks very dry. No obvious painful or deformed parts. Patient with hx of bipolar. on Li and buproprion.  Patient had expressed sadness and said "if it was not for my dog I would kill myself" to neighbor recently.   She is currently not answering questions and not cooperative   (18 Sep 2020 17:31)      PAST MEDICAL & SURGICAL HISTORY:  Frozen shoulder    Skin cancer  Basla cell &amp; squamous cells    Bipolar 1 disorder    History of skin cancer    Plantar fasciitis  left foot            MEDICATIONS:  MEDICATIONS  (STANDING):  aspirin enteric coated 81 milliGRAM(s) Oral daily  diltiazem Infusion 5 mG/Hr (5 mL/Hr) IV Continuous <Continuous>  metoprolol succinate ER 25 milliGRAM(s) Oral daily      Allergies    No Known Allergies    Intolerances        FAMILY HISTORY:  Family history of acute myocardial infarction (Father)      Non-contributary for premature coronary disease or sudden cardiac death    SOCIAL HISTORY:    [ ] Non-smoker  [ ] Smoker  [ ] Alcohol    FLU VACCINE THIS YEAR STARTS IN AUGUST:  [ ] Yes    [ ] No    IF OVER 65 HAVE YOU EVER HAD A PNA VACCINE:  [ ] Yes    [ ] No       [ ] N/A      REVIEW OF SYSTEMS:  [ ]chest pain  [  ]shortness of breath  [  ]palpitations  [  ]syncope  [ ]near syncope [ ]upper extremity weakness   [ ] lower extremity weakness  [  ]diplopia  [  ]altered mental status   [  ]fevers  [ ]chills [ ]nausea  [ ]vomitting  [  ]dysphagia    [ ]abdominal pain  [ ]melena  [ ]BRBPR    [  ]epistaxis  [  ]rash    [ ]lower extremity edema        [ ] All others negative	  [X ] Unable to obtain      LABS:	 	    CARDIAC MARKERS:  CARDIAC MARKERS ( 18 Sep 2020 12:49 )  x     / x     / 334 U/L / x     / x                                  12.5   14.46 )-----------( 242      ( 18 Sep 2020 12:49 )             41.6     Hb Trend: 12.5<--    09-18    160<HH>  |  126<H>  |  103<H>  ----------------------------<  138<H>  3.7   |  14<L>  |  3.79<H>    Ca    10.5      18 Sep 2020 16:00    TPro  6.7  /  Alb  3.5  /  TBili  0.4  /  DBili  x   /  AST  25  /  ALT  19  /  AlkPhos  74  09-18    Creatinine Trend: 3.79<--, 2.81<--    Coags:  PT/INR - ( 18 Sep 2020 12:49 )   PT: 14.4 sec;   INR: 1.22 ratio         PTT - ( 18 Sep 2020 12:49 )  PTT:20.1 sec        PHYSICAL EXAM:  T(C): 36.4 (09-18-20 @ 18:40), Max: 36.4 (09-18-20 @ 12:32)  HR: 98 (09-18-20 @ 18:40) (90 - 172)  BP: 113/70 (09-18-20 @ 18:40) (90/62 - 127/75)  RR: 20 (09-18-20 @ 18:40) (16 - 25)  SpO2: 96% (09-18-20 @ 18:40) (94% - 100%)  Wt(kg): --     I&O's Summary    HEENT:  (-)icterus (-)pallor  CV: N S1 S2 1/6 DAVID (+)2 Pulses B/l  Resp:  Clear to ausculatation B/L, normal effort  GI: (+) BS Soft, NT, ND  Lymph:  (-)Edema, (-)obvious lymphadenopathy  Skin: Warm to touch, Normal turgor  Psych: Appropriate mood and blunted affect        TELEMETRY: 	  afib    ECG:  	Afib 176 BPM, nonspecific T wave abnormality    RADIOLOGY:         CXR:  no infiltrate     ASSESSMENT/PLAN: 	75y Female found on the floor at home, confused in rapid afib renal failure unknown baseline       - Agree with IVF  - Cont cardizem gtt  - echo  - Unclear at present if she is an anticoagulation candidate.  Would consider starting heparin gtt if she cooperates with PTT checks and mental status allows  - will follow with you    I once again thank you for allowing me to participate in the care of your patient.  If you have any questions or concerns please do not hesitate to contact me.    Jeevan Avina MD, [facility]  BEEPER [phone]

## 2020-09-18 NOTE — H&P ADULT - NSICDXPASTMEDICALHX_GEN_ALL_CORE_FT
PAST MEDICAL HISTORY:  Bipolar 1 disorder     Frozen shoulder     Skin cancer Basla cell & squamous cells

## 2020-09-18 NOTE — PROVIDER CONTACT NOTE (OTHER) - ASSESSMENT
pt is using the external catheter  bladder scan showed 320ml of urine  pt shows no s/s of discomfort

## 2020-09-18 NOTE — ED PROVIDER NOTE - PROGRESS NOTE DETAILS
Ginette: EKG at 175 and blood gas K of 8. Patient now at a rate of 120. Will give Ca gluconate until BMP back. Hydrating patient.

## 2020-09-18 NOTE — H&P ADULT - HISTORY OF PRESENT ILLNESS
Patient is 78 yo who was found on floor by EMS. neighbor  called because mail was piling up. Dog in house with feces all over. Unknown down time but seems long. Patient speaking but not in response to questions. Looks very dry. No obvious painful or deformed parts. Patient with hx of bipolar. on Li and buproprion.  Patient had expressed sadness and said "if it was not for my dog I would kill myself" to neighbor recently.

## 2020-09-18 NOTE — CONSULT NOTE ADULT - PROBLEM SELECTOR RECOMMENDATION 9
Unknown baseline serum creatinine  worsening  check urine tests  check renal USG  appears dry  starting hydration with D5 NaHCO3 100mEq x liter @ 75cc/hr  reeval in AM  BMP in AM

## 2020-09-18 NOTE — ED ADULT NURSE NOTE - OBJECTIVE STATEMENT
75 yr old female was found on floor in her house by neighbors. ems states it looks like she was there for a few days. on arrival pt in a and o x o she is making noises but unable to answer questions. lungs clear abd soft non tender no swelling in extremities, a few old day type skin tears on her wrists. no temp. skin in tact no skin breakdown 75 yr old female PMH bipolar I, denies PSH presents to the ED via EMS s/p unwitnessed fall. According to EMS pt was found on floor in her house by neighbors. ems states it looks like she was there for a few days bc of the amount of dog feces found in the home. On arrival pt in A&Ox0 she is making noises but unable to answer questions. lungs clear, abd soft, non tender, no swelling in extremities. Skin tear noted to the right wrist, bruising noted on the right shoulder. no temp. skin in tact no skin breakdown. Pt tachycardic to the 170s, Placed on CM- Afib.

## 2020-09-19 NOTE — PROGRESS NOTE ADULT - SUBJECTIVE AND OBJECTIVE BOX
Patient denies chest pain or shortness of breath.   Review of systems otherwise (-)  	    MEDICATIONS  (STANDING):  aspirin enteric coated 81 milliGRAM(s) Oral daily  dextrose 5% 1000 milliLiter(s) (75 mL/Hr) IV Continuous <Continuous>  diltiazem Infusion 5 mG/Hr (5 mL/Hr) IV Continuous <Continuous>  metoprolol succinate ER 25 milliGRAM(s) Oral daily      LABS:	 	                        10.8   11.35 )-----------( 183      ( 19 Sep 2020 07:17 )             36.6     140  |  96  |  85<H>  ----------------------------<  980<HH>  2.4<LL>   |  38<H>  |  2.46<H>    Ca    7.7<L>      19 Sep 2020 13:00    TPro  6.7  /  Alb  3.5  /  TBili  0.4  /  DBili  x   /  AST  25  /  ALT  19  /  AlkPhos  74  09-18    Creatinine Trend: 2.46<--, 3.70<--, 3.79<--, 2.81<--    PHYSICAL EXAM:  T(C): 36.7 (09-19-20 @ 11:31), Max: 36.7 (09-19-20 @ 11:31)  HR: 76 (09-19-20 @ 11:31) (71 - 172)  BP: 120/64 (09-19-20 @ 11:31) (90/62 - 151/71)  RR: 18 (09-19-20 @ 11:31) (16 - 25)  SpO2: 100% (09-19-20 @ 11:31) (94% - 100%)  Wt(kg): --  I&O's Summary    18 Sep 2020 07:01  -  19 Sep 2020 07:00  --------------------------------------------------------  IN: 810 mL / OUT: 500 mL / NET: 310 mL      Gen: Appears well in NAD  HEENT:  (-)icterus (-)pallor  CV: N S1 S2 1/6 DAVID (+)2 Pulses B/l  Resp:  Clear to ausculatation B/L, normal effort  GI: (+) BS Soft, NT, ND  Lymph:  (-)Edema, (-)obvious lymphadenopathy  Skin: Warm to touch, Normal turgor  Psych: unable to assess      TELEMETRY: 	SR 70-90      < from: CT Head No Cont (09.18.20 @ 14:06) >    IMPRESSION:    Volume loss, microvascular disease, age indeterminate lacunar infarcts, no acute hemorrhage or midline shift. If symptoms persist consider follow-up head CT or MR if no contraindications.    < end of copied text >      ASSESSMENT/PLAN: 	    75y Female found on the floor at home, confused in rapid afib renal failure unknown baseline     - Agree with IVF  - ok to DC cardizem Gtt  - check echo  - Unclear at present if she is an anticoagulation candidate.  Would consider starting heparin gtt if she cooperates with PTT checks and mental status allows  - will follow with you

## 2020-09-19 NOTE — PROGRESS NOTE ADULT - PROBLEM SELECTOR PLAN 1
Serum creatinine slightly improved with IVF  Unknown baseline  daily CPK levels  daily BMP  continue D5 with 100mEq HCO3 @ 75cc/hr

## 2020-09-19 NOTE — CHART NOTE - NSCHARTNOTEFT_GEN_A_CORE
Medicine  NP note    F/U Hypernatremia, lactic acidosis    76 y/o female with PMHx of Bipolar 1 disorder , Frozen shoulder    Skin cancer  Basal cell & squamous cells. who was found on floor by EMS.   Patient hypernatremic with initial Na 160,     Hypernatremia  on D5 with HCo3 gtt   Repea Na+ 158  F/U AM chemistry    Metabolic acidosis  On hco3 gtt  HCo3 improving  VBG am  Trend BMP am  F/U renal am    lactic acidosis  Lactic cleared    Will follow    Memo epps FNP BC  [id number]

## 2020-09-19 NOTE — PROGRESS NOTE ADULT - SUBJECTIVE AND OBJECTIVE BOX
Patient is a 75y old  Female who presents with a chief complaint of The patient is a 75y Female was BIBA because of AMS. (19 Sep 2020 14:32)      SUBJECTIVE / OVERNIGHT EVENTS:    Events noted.  CONSTITUTIONAL: No fever,  or fatigue  RESPIRATORY: No cough, wheezing,  No shortness of breath  CARDIOVASCULAR: No chest pain, palpitations, dizziness, or leg swelling  GASTROINTESTINAL: No abdominal or epigastric pain.   NEUROLOGICAL: No headaches,     MEDICATIONS  (STANDING):  aspirin enteric coated 81 milliGRAM(s) Oral daily  dextrose 5% + sodium chloride 0.45%. 1000 milliLiter(s) (100 mL/Hr) IV Continuous <Continuous>  metoprolol succinate ER 25 milliGRAM(s) Oral daily    MEDICATIONS  (PRN):        CAPILLARY BLOOD GLUCOSE        I&O's Summary    18 Sep 2020 07:  -  19 Sep 2020 07:00  --------------------------------------------------------  IN: 810 mL / OUT: 500 mL / NET: 310 mL    19 Sep 2020 07:01  -  19 Sep 2020 18:17  --------------------------------------------------------  IN: 800 mL / OUT: 0 mL / NET: 800 mL        PHYSICAL EXAM:    NECK: Supple, No JVD  CHEST/LUNG: Clear to auscultation bilaterally; No wheezing.  HEART: Regular rate and rhythm; No murmurs, rubs, or gallops  ABDOMEN: Soft, Nontender, Nondistended; Bowel sounds present  EXTREMITIES:   No edema  NEUROLOGY: AA      LABS:                        10.8   11.35 )-----------( 183      ( 19 Sep 2020 07:17 )             36.6     09-19    159<H>  |  123<H>  |  99<H>  ----------------------------<  149<H>  3.5   |  23  |  3.18<H>    Ca    10.0      19 Sep 2020 14:48    TPro  6.7  /  Alb  3.5  /  TBili  0.4  /  DBili  x   /  AST  25  /  ALT  19  /  AlkPhos  74  -    PT/INR - ( 18 Sep 2020 12:49 )   PT: 14.4 sec;   INR: 1.22 ratio         PTT - ( 18 Sep 2020 12:49 )  PTT:20.1 sec  CARDIAC MARKERS ( 19 Sep 2020 07:16 )  x     / x     / 169 U/L / x     / x      CARDIAC MARKERS ( 18 Sep 2020 12:49 )  x     / x     / 334 U/L / x     / x          Urinalysis Basic - ( 19 Sep 2020 10:21 )    Color: Light Yellow / Appearance: Clear / S.013 / pH: x  Gluc: x / Ketone: Negative  / Bili: Negative / Urobili: <2 mg/dL   Blood: x / Protein: Trace / Nitrite: Negative   Leuk Esterase: Small / RBC: 1 /HPF / WBC 1 /HPF   Sq Epi: x / Non Sq Epi: 2 /HPF / Bacteria: Many      CAPILLARY BLOOD GLUCOSE         @ 17:23  Culture-urine --  Culture results   <10,000 CFU/mL Normal Urogenital Carrie  method type --  Organism --  Organism Identification --  Specimen source .Urine Clean Catch (Midstream)   @ 15:00  Culture-urine --  Culture results   No growth to date.  method type --  Organism --  Organism Identification --  Specimen source .Blood Blood-Peripheral            @ 17:23  Culture blood --  Culture results   <10,000 CFU/mL Normal Urogenital Carrie  Gram stain --  Gram stain blood --  Method type --  Organism --  Organism identification --  Specimen source .Urine Clean Catch (Midstream)    @ 15:00  Culture blood --  Culture results   No growth to date.  Gram stain --  Gram stain blood --  Method type --  Organism --  Organism identification --  Specimen source .Blood Blood-Peripheral      RADIOLOGY & ADDITIONAL TESTS:    Imaging Personally Reviewed:    Consultant(s) Notes Reviewed:      Care Discussed with Consultants/Other Providers:    Mark Koo MD, CMD, FACP    [id number] [id number]  Office Tel: [phone]  Cell: [phone]

## 2020-09-19 NOTE — PROGRESS NOTE ADULT - SUBJECTIVE AND OBJECTIVE BOX
[facility] - MAY Lynn / MAY Delgado / JUDI Kim/ MAY Castañeda/ MAY Christina/ CHASE Roberto / LACY Li / ADRIANA Shelley  ---------------------------------------------------------------------------------------------------------------  seen and examined today for hypernatremia  Interval : NAD  VITALS:  T(F): 98.1 (09-19-20 @ 11:31), Max: 98.1 (09-19-20 @ 11:31)  HR: 76 (09-19-20 @ 11:31)  BP: 120/64 (09-19-20 @ 11:31)  RR: 18 (09-19-20 @ 11:31)  SpO2: 100% (09-19-20 @ 11:31)  Wt(kg): --    09-18 @ 07:01  -  09-19 @ 07:00  --------------------------------------------------------  IN: 810 mL / OUT: 500 mL / NET: 310 mL      Physical Exam :-  Constitutional: NAD  Neck: Supple.  Respiratory: Bilateral equal breath sounds,  Cardiovascular: S1, S2 normal,  Gastrointestinal: Bowel Sounds present, soft, non tender.  Extremities: No edema  Neurological: Alert and Oriented   Psychiatric: Normal mood, normal affect  Data:-  Allergies :   No Known Allergies    Hospital Medications:   MEDICATIONS  (STANDING):  aspirin enteric coated 81 milliGRAM(s) Oral daily  dextrose 5% 1000 milliLiter(s) (75 mL/Hr) IV Continuous <Continuous>  diltiazem Infusion 5 mG/Hr (5 mL/Hr) IV Continuous <Continuous>  metoprolol succinate ER 25 milliGRAM(s) Oral daily    09-18    158<H>  |  123<H>  |  96<H>  ----------------------------<  223<H>  3.9   |  16<L>  |  3.70<H>    Ca    10.4      18 Sep 2020 23:42    TPro  6.7  /  Alb  3.5  /  TBili  0.4  /  DBili      /  AST  25  /  ALT  19  /  AlkPhos  74  09-18    Creatinine Trend: 3.70 <--, 3.79 <--, 2.81 <--                        10.8   11.35 )-----------( 183      ( 19 Sep 2020 07:17 )             36.6

## 2020-09-19 NOTE — CHART NOTE - NSCHARTNOTEFT_GEN_A_CORE
Reviewed pt's lab (Sodium 158). Spoke with Dr. [place] regarding pt's BMP  results, recs to give D5 1/2NS at 100cc/hr and f/u BMP in [place]  [id number] Reviewed pt's lab (Sodium 159). Spoke with Dr. [place] regarding pt's BMP  results, recs to give D5 1/2NS at 100cc/hr and f/u BMP in [place]  [id number]

## 2020-09-19 NOTE — CHART NOTE - NSCHARTNOTEFT_GEN_A_CORE
CC: Persistent Atrial Fibrillation      HPI:  Called by RN to relate that heart rhythm has converted to AFib in range 110's, prior she was in NSR with episodes of PAF, that was noted on previous shift  Pt seen and examined at bedside, she appeared in NAD, A & O X 3. She denied having cp, sob, dizziness, abdominal pain, n/v or palpitations  EKG done confirming AFib with RVR @ 114 bpm        ROS:  CONSTITUTIONAL:  No fever, chills, rigors  CARDIOVASCULAR:  No chest pain or palpitations  RESPIRATORY:   No SOB,   GASTROINTESTINAL:  No abd pain, N/V  EXTREMITIES:  No swelling or joint pain  GENITOURINARY:  No burning on urination, increased frequency or urgency.  No flank pain        PAST MEDICAL & SURGICAL HISTORY:  Atrial fibrillation  Frozen shoulder  Bipolar 1 disorder  AMS (altered mental status)  Metabolic acidosis  Hypernatremia  MAYELIN (acute kidney injury)  History of skin cancer  Plantar fasciitis        Vital Signs Last 24 Hrs  T(C): 36.4 (19 Sep 2020 20:30), Max: 36.8 (19 Sep 2020 20:03)  T(F): 97.6 (19 Sep 2020 20:30), Max: 98.3 (19 Sep 2020 20:03)  HR: 89 (19 Sep 2020 20:30) (68 - 89)  BP: 100/58 (19 Sep 2020 20:30) (100/46 - 151/71)  BP(mean): --  RR: 18 (19 Sep 2020 20:30) (18 - 18)  SpO2: 98% (19 Sep 2020 20:30) (98% - 100%)      Physical Exam:  General: WN/WD NAD, AOx3, nontoxic appearing  Head:  NC/AT  CV: RRR, S1S2   Respiratory: CTA B/L, nonlabored  Abdominal: (+) bowel sounds x4. Soft, Non distended, non tender  Genitourinary: + Clarke - draining clear yellow urine   MSK: No BLLE edema, + peripheral pulses, FROM all 4 extremity  Skin: (+) warm, dry   Psych: Appropriate affect       Labs:                        10.8   11.35 )-----------( 183      ( 19 Sep 2020 07:17 )             36.6     09-19    149<H>  |  113<H>  |  91<H>  ----------------------------<  145<H>  3.2<L>   |  24  |  2.83<H>    Ca    9.4      19 Sep 2020 21:32  Phos  2.4     09-19  Mg     2.3     09-19    TPro  6.7  /  Alb  3.5  /  TBili  0.4  /  DBili  x   /  AST  25  /  ALT  19  /  AlkPhos  74  09-18      Urinalysis Basic - ( 09-19 @ 10:21 )    Color: Many / Appearance: Negative / SG: -- / pH: Negative  Gluc: Trace / Ketone: Light Yellow  / Bili: -- / Urobili: 4   Blood: 2 / Protein: -- / Nitrite: Negative   Leuk Esterase: Small / RBC: 1.013 / WBC --   Sq Epi: Trace / Non Sq Epi: 1 / Bacteria: 6.0    Urinalysis Basic - ( 09-18 @ 12:54 )    Color: 0.0 / Appearance: Small / SG: -- / pH: Negative  Gluc: Negative / Ketone: Yellow  / Bili: -- / Urobili: 5   Blood: 1 / Protein: -- / Nitrite: Negative   Leuk Esterase: Negative / RBC: 1.019 / WBC --   Sq Epi: 30 mg/dL / Non Sq Epi: 3 / Bacteria: 5.5      Radiology:      Assessment & Plan:  HPI:  76 yo who was found on floor by EMS. no known PMH. Found down, dry, demented. Pt in MAYELIN,  hypernatremic, improving with IVF.  Pt with hx of PAF, had been in SR, now in persistent AFib with 's  Pt remains asymptomatic, and hemodynamically stable      PLAN (As discussed with Dr. Koo):  >Continue to monitor  >Start Heparin gtt in light of persistent AFib  >Send BMP, Mg, Phos levels     -Keep K > 4 Mg > 2     -Monitor for worsening hypernatremia            -Serum Na down to 149 from 159   >Continue IVF at lower rate 80 mL/H             -In light of lower Na   >BMP in AM  >Nephrology follow up  >Endorse to day team; follow up per Attending

## 2020-09-20 NOTE — PROGRESS NOTE ADULT - SUBJECTIVE AND OBJECTIVE BOX
pt seen and examined, no complaints, ROS - .          aspirin enteric coated 81 milliGRAM(s) Oral daily  dextrose 5% + sodium chloride 0.45%. 1000 milliLiter(s) IV Continuous <Continuous>  heparin   Injectable 5000 Unit(s) IV Push every 6 hours PRN  heparin   Injectable 2500 Unit(s) IV Push every 6 hours PRN  heparin  Infusion.  Unit(s)/Hr IV Continuous <Continuous>  metoprolol succinate ER 25 milliGRAM(s) Oral daily                            8.6    8.65  )-----------( 120      ( 20 Sep 2020 05:56 )             28.1       Hemoglobin: 8.6 g/dL (09-20 @ 05:56)  Hemoglobin: 10.8 g/dL (09-19 @ 07:17)  Hemoglobin: 12.5 g/dL (09-18 @ 12:49)      09-20    149<H>  |  116<H>  |  80<H>  ----------------------------<  144<H>  3.6   |  22  |  2.51<H>    Ca    9.1      20 Sep 2020 05:56  Phos  2.4     09-19  Mg     2.3     09-19    TPro  6.7  /  Alb  3.5  /  TBili  0.4  /  DBili  x   /  AST  25  /  ALT  19  /  AlkPhos  74  09-18    Creatinine Trend: 2.51<--, 2.83<--, 3.18<--, 2.46<--, 3.70<--, 3.79<--    COAGS: PTT - ( 20 Sep 2020 05:57 )  PTT:87.7 sec    CARDIAC MARKERS ( 20 Sep 2020 05:56 )  x     / x     / 87 U/L / x     / x      CARDIAC MARKERS ( 19 Sep 2020 07:16 )  x     / x     / 169 U/L / x     / x      CARDIAC MARKERS ( 18 Sep 2020 12:49 )  x     / x     / 334 U/L / x     / x            T(C): 36.6 (09-20-20 @ 08:21), Max: 36.8 (09-19-20 @ 20:03)  HR: 71 (09-20-20 @ 08:21) (68 - 105)  BP: 105/55 (09-20-20 @ 08:21) (100/46 - 141/64)  RR: 18 (09-20-20 @ 08:21) (18 - 18)  SpO2: 100% (09-20-20 @ 08:21) (97% - 100%)  Wt(kg): --    I&O's Summary    19 Sep 2020 07:01  -  20 Sep 2020 07:00  --------------------------------------------------------  IN: 1820 mL / OUT: 1400 mL / NET: 420 mL        Gen: Appears well in NAD  HEENT:  (-)icterus (-)pallor  CV: N S1 S2 1/6 DAVID (+)2 Pulses B/l  Resp:  Clear to ausculatation B/L, normal effort  GI: (+) BS Soft, NT, ND  Lymph:  (-)Edema, (-)obvious lymphadenopathy  Skin: Warm to touch, Normal turgor  Psych: unable to assess      TELEMETRY: 	SR 70-90      < from: CT Head No Cont (09.18.20 @ 14:06) >    IMPRESSION:    Volume loss, microvascular disease, age indeterminate lacunar infarcts, no acute hemorrhage or midline shift. If symptoms persist consider follow-up head CT or MR if no contraindications.    < end of copied text >      ASSESSMENT/PLAN: 	    75y Female found on the floor at home, confused in rapid afib renal failure unknown baseline      Cont A/C   ASA, statin  tolerating BB  IVF , monitor creatine

## 2020-09-20 NOTE — PROGRESS NOTE ADULT - SUBJECTIVE AND OBJECTIVE BOX
Patient is a 75y old  Female who presents with a chief complaint of The patient is a 75y Female was BIBA because of AMS. (20 Sep 2020 10:10)      SUBJECTIVE / OVERNIGHT EVENTS:    Events noted.  CONSTITUTIONAL: No fever,  or fatigue  RESPIRATORY: No cough, wheezing,  No shortness of breath  CARDIOVASCULAR: No chest pain, palpitations,   GASTROINTESTINAL: No abdominal or epigastric pain.  NEUROLOGICAL: No headaches,     MEDICATIONS  (STANDING):  aspirin enteric coated 81 milliGRAM(s) Oral daily  dextrose 5% + sodium chloride 0.45%. 1000 milliLiter(s) (80 mL/Hr) IV Continuous <Continuous>  heparin  Infusion. 800 Unit(s)/Hr (8 mL/Hr) IV Continuous <Continuous>  metoprolol succinate ER 25 milliGRAM(s) Oral daily    MEDICATIONS  (PRN):  aluminum hydroxide/magnesium hydroxide/simethicone Suspension 30 milliLiter(s) Oral every 6 hours PRN Dyspepsia  heparin   Injectable 5000 Unit(s) IV Push every 6 hours PRN For aPTT less than 40  heparin   Injectable 2500 Unit(s) IV Push every 6 hours PRN For aPTT between 40 - 57        CAPILLARY BLOOD GLUCOSE        I&O's Summary    19 Sep 2020 07:  -  20 Sep 2020 07:00  --------------------------------------------------------  IN: 1820 mL / OUT: 1400 mL / NET: 420 mL    20 Sep 2020 07:01  -  20 Sep 2020 23:02  --------------------------------------------------------  IN: 1338 mL / OUT: 1500 mL / NET: -162 mL        PHYSICAL EXAM:    NECK: Supple, No JVD  CHEST/LUNG: Clear to auscultation bilaterally; No wheezing.  HEART: Regular rate and rhythm; No murmurs, rubs, or gallops  ABDOMEN: Soft, Nontender, Nondistended; Bowel sounds present  EXTREMITIES:   No edema  NEUROLOGY: AAO       LABS:                        8.5    8.46  )-----------( 115      ( 20 Sep 2020 21:53 )             28.2         149<H>  |  116<H>  |  80<H>  ----------------------------<  144<H>  3.6   |  22  |  2.51<H>    Ca    9.1      20 Sep 2020 05:56  Phos  2.4       Mg     2.3           PTT - ( 20 Sep 2020 21:21 )  PTT:119.5 sec  CARDIAC MARKERS ( 20 Sep 2020 05:56 )  x     / x     / 87 U/L / x     / x      CARDIAC MARKERS ( 19 Sep 2020 07:16 )  x     / x     / 169 U/L / x     / x          Urinalysis Basic - ( 19 Sep 2020 10:21 )    Color: Light Yellow / Appearance: Clear / S.013 / pH: x  Gluc: x / Ketone: Negative  / Bili: Negative / Urobili: <2 mg/dL   Blood: x / Protein: Trace / Nitrite: Negative   Leuk Esterase: Small / RBC: 1 /HPF / WBC 1 /HPF   Sq Epi: x / Non Sq Epi: 2 /HPF / Bacteria: Many      CAPILLARY BLOOD GLUCOSE         @ 17:23  Culture-urine --  Culture results   <10,000 CFU/mL Normal Urogenital Carrie  method type --  Organism --  Organism Identification --  Specimen source .Urine Clean Catch (Midstream)   @ 15:00  Culture-urine --  Culture results   No growth to date.  method type --  Organism --  Organism Identification --  Specimen source .Blood Blood-Peripheral            @ 17:23  Culture blood --  Culture results   <10,000 CFU/mL Normal Urogenital Carrie  Gram stain --  Gram stain blood --  Method type --  Organism --  Organism identification --  Specimen source .Urine Clean Catch (Midstream)    @ 15:00  Culture blood --  Culture results   No growth to date.  Gram stain --  Gram stain blood --  Method type --  Organism --  Organism identification --  Specimen source .Blood Blood-Peripheral      RADIOLOGY & ADDITIONAL TESTS:    Imaging Personally Reviewed:    Consultant(s) Notes Reviewed:      Care Discussed with Consultants/Other Providers:    Mark Koo MD, CMD, FACP    [address] [place] [address]  Office Tel: [phone]  Cell: [phone]

## 2020-09-20 NOTE — PROGRESS NOTE ADULT - PROBLEM SELECTOR PLAN 1
Serum creatinine slightly improved with IVF  Unknown baseline  daily BMP  continue D51/2NS @ 80cc/hr

## 2020-09-20 NOTE — PROGRESS NOTE ADULT - SUBJECTIVE AND OBJECTIVE BOX
[facility] - MAY Lynn / MAY Delgado / JUDI Kim/ MAY Castañeda/ MAY Christina/ CHASE Roberto / LACY Li / ADRIANA Shelley  ---------------------------------------------------------------------------------------------------------------  seen and examined today for MAYELIN  Interval : cret improving, sodium improving  VITALS:  T(F): 97.8 (09-20-20 @ 08:21), Max: 98.3 (09-19-20 @ 20:03)  HR: 71 (09-20-20 @ 08:21)  BP: 105/55 (09-20-20 @ 08:21)  RR: 18 (09-20-20 @ 08:21)  SpO2: 100% (09-20-20 @ 08:21)  Wt(kg): --    09-19 @ 07:01  -  09-20 @ 07:00  --------------------------------------------------------  IN: 1820 mL / OUT: 1400 mL / NET: 420 mL      Physical Exam :-  Constitutional: NAD  Neck: Supple.  Respiratory: Bilateral equal breath sounds,  Cardiovascular: S1, S2 normal,  Gastrointestinal: Bowel Sounds present, soft, non tender.  Extremities: No edema  Neurological: Alert and Oriented x 1-2  Psychiatric: Normal mood, normal affect  Data:-  Allergies :   No Known Allergies    Hospital Medications:   MEDICATIONS  (STANDING):  aspirin enteric coated 81 milliGRAM(s) Oral daily  dextrose 5% + sodium chloride 0.45%. 1000 milliLiter(s) (80 mL/Hr) IV Continuous <Continuous>  heparin  Infusion.  Unit(s)/Hr (11 mL/Hr) IV Continuous <Continuous>  metoprolol succinate ER 25 milliGRAM(s) Oral daily    09-20    149<H>  |  116<H>  |  80<H>  ----------------------------<  144<H>  3.6   |  22  |  2.51<H>    Ca    9.1      20 Sep 2020 05:56  Phos  2.4     09-19  Mg     2.3     09-19    TPro  6.7  /  Alb  3.5  /  TBili  0.4  /  DBili      /  AST  25  /  ALT  19  /  AlkPhos  74  09-18    Creatinine Trend: 2.51 <--, 2.83 <--, 3.18 <--, 2.46 <--, 3.70 <--, 3.79 <--, 2.81 <--                        8.6    8.65  )-----------( 120      ( 20 Sep 2020 05:56 )             28.1

## 2020-09-21 NOTE — CONSULT NOTE ADULT - ASSESSMENT
Bipolar Disorder by history  Delirium secondary to MAYELIN  Rule out Vascular dementia. Rule out fall from elevated Lithium levels in the context of renal insufficiency. Lithium level in ER was low but she was on the floor at home for days possibly and the Li level decreased during that time   Rule out nephrogenic diabetes insipidus from Lithium. Given her renal function, would not resume Lithium. Given her confusion, would not resume Bupropion.    Recommend  Check QTc and if under 500ms, start Abilify 5mg p.o. qd and Haldol 1mg IV q6h prn agitation  Nephrology followup  No Lithium or Bupropion for now  Social work consultation  I await call back from her last psychiatrist, Dr. Luis.   Will follow with you here. Thank you.    Vu Bradley M.D.  Psychiatry  [phone]

## 2020-09-21 NOTE — PROGRESS NOTE ADULT - SUBJECTIVE AND OBJECTIVE BOX
Patient seen and examined  no complaints    No Known Allergies    Hospital Medications:   MEDICATIONS  (STANDING):  aspirin enteric coated 81 milliGRAM(s) Oral daily  dextrose 5% + sodium chloride 0.45%. 1000 milliLiter(s) (80 mL/Hr) IV Continuous <Continuous>  heparin  Infusion. 800 Unit(s)/Hr (8 mL/Hr) IV Continuous <Continuous>  metoprolol succinate ER 25 milliGRAM(s) Oral daily      VITALS:  T(F): 97.6 (20 @ 13:07), Max: 98.1 (20 @ 16:01)  HR: 84 (20 @ 13:07)  BP: 124/65 (20 @ 13:07)  RR: 18 (20 @ 13:07)  SpO2: 98% (20 @ 13:07)  Wt(kg): --     @ 07:01  -   @ 07:00  --------------------------------------------------------  IN: 1338 mL / OUT: 2700 mL / NET: -1362 mL     @ 07:01  -   @ 14:27  --------------------------------------------------------  IN: 430 mL / OUT: 900 mL / NET: -470 mL      Physical Exam :-  Constitutional: NAD  Neck: Supple.  Respiratory: Bilateral equal breath sounds,  Cardiovascular: S1, S2 normal,  Gastrointestinal: Bowel Sounds present, soft, non tender.  Extremities: No edema  Neurological: Alert and Oriented x 1-2  Psychiatric: Normal mood, normal affect  + indwelling lincoln      LABS:      150<H>  |  119<H>  |  50<H>  ----------------------------<  113<H>  3.7   |  24  |  1.85<H>    Ca    8.8      21 Sep 2020 06:06  Phos  2.4       Mg     1.9           Creatinine Trend: 1.85 <--, 2.51 <--, 2.83 <--, 3.18 <--, 2.46 <--, 3.70 <--, 3.79 <--, 2.81 <--                        8.8    6.96  )-----------( 116      ( 21 Sep 2020 06:06 )             28.6     Urine Studies:  Urinalysis Basic - ( 19 Sep 2020 10:21 )    Color: Light Yellow / Appearance: Clear / S.013 / pH:   Gluc:  / Ketone: Negative  / Bili: Negative / Urobili: <2 mg/dL   Blood:  / Protein: Trace / Nitrite: Negative   Leuk Esterase: Small / RBC: 1 /HPF / WBC 1 /HPF   Sq Epi:  / Non Sq Epi: 2 /HPF / Bacteria: Many      Osmolality, Random Urine: 350 mosm/Kg ( @ 10:21)  Sodium, Random Urine: <35 mmol/L ( @ 07:22)  Potassium, Random Urine: 28 mmol/L ( @ 07:22)  Creatinine, Random Urine: 78 mg/dL ( @ 07:22)  Protein/Creatinine Ratio Calculation: 0.2 Ratio ( @ 07:22)    RADIOLOGY & ADDITIONAL STUDIES:

## 2020-09-21 NOTE — CONSULT NOTE ADULT - SUBJECTIVE AND OBJECTIVE BOX
Chart reviewed and patient seen by undersigned    Asked to consult for mental status change with a history of Bipolar Disorder    Historians include chart and the pt. (a poor historian). I left a message with her most recent psychiatrist (Dr. Daniel Luis- [phone]) to call me    History of Present Illness  The patient is a 75 year old never  WF with history of Bipolar Disorder. She says she fell on her dog and was unconscious for a few days. Neighbor called EMS after finding pt. on the floor after her mail had been piling up. The pt. was found to be in MAYELIN. She says she takes at home Lithium (I confirmed this with [facility] in [place]- [phone]). Pharmacy says last rx. filled on 8/11/20 for Lithium ER 300mg bid, Bupropion 100mg bid, and Metoprolol CR 25mg qd. Pt. denies neurovegetative sx. of depression but told staff upon admission she would kill herself if not for her dog. However, she does not now recall saying that. The pt. says she last saw Dr. Luis one month ago but he left his practice and she never called the new psychiatrist for an appointment.     Past Psychiatric History  She says her last manic episode was in 1940 and consisted of being hyperactive. Never psychiatrically hospitalized, suicidal, nor psychotic. She never had ECT. She reports feeling well in recent months.     Psychosocial History  Lives alone with dog. Never . No children. Stopped smoking cigarets in the 1980s. She denies drinking alcohol and using illicit drugs. Retired .     PAST MEDICAL & SURGICAL HISTORY:  Frozen shoulder    Skin cancer  Basla cell &amp; squamous cells    Bipolar 1 disorder    History of skin cancer    Plantar fasciitis  left foot        FAMILY HISTORY:  Family history of acute myocardial infarction (Father)        Vital Signs Last 24 Hrs  T(C): 36.4 (21 Sep 2020 13:07), Max: 36.7 (20 Sep 2020 16:01)  T(F): 97.6 (21 Sep 2020 13:07), Max: 98.1 (20 Sep 2020 16:01)  HR: 84 (21 Sep 2020 13:07) (68 - 88)  BP: 124/65 (21 Sep 2020 13:07) (116/59 - 146/66)  BP(mean): --  RR: 18 (21 Sep 2020 13:07) (18 - 18)  SpO2: 98% (21 Sep 2020 13:07) (97% - 100%)                          8.8    6.96  )-----------( 116      ( 21 Sep 2020 06:06 )             28.6       09-21    150<H>  |  119<H>  |  50<H>  ----------------------------<  113<H>  3.7   |  24  |  1.85<H>    Ca    8.8      21 Sep 2020 06:06  Phos  2.4     09-19  Mg     1.9     09-21          < from: CT Head No Cont (09.18.20 @ 14:06) >  EXAM:  CT BRAIN                            PROCEDURE DATE:  09/18/2020            INTERPRETATION:  CLINICAL INDICATION: 75-year-old female, found down    Technique: Noncontrast CT of the head was performed.    Multiple contiguous axial images were acquired from the skull base to the vertex without the administration of intravenous contrast. Coronal and sagittal reformations were made.    COMPARISON: None.    FINDINGS:  Moderate enlargement of ventricles and sulci consistent with volume loss. There are hemispheric white matter areas of low attenuation which are nonspecific but likely related to sequelae of microvascular disease with age indeterminate lacunar infarcts, MRI more sensitive for new ischemia, may be obtained for assessment as clinically warranted. Atherosclerotic calcification of the carotid siphons. There is no intraparenchymal hematoma, mass effect or midline shift. No abnormal extra-axial fluid collections are present. The basal cisterns are patent.    The calvarium is intact, with foci of lucency and sclerosis. Orbits, paranasal sinuses and tympanomastoid cavities appear free of acute disease. Incidental unfused posterior arch C1.    IMPRESSION:    Volume loss, microvascular disease, age indeterminate lacunar infarcts, no acute hemorrhage or midline shift. If symptoms persist consider follow-up head CT or MR if no contraindications.            < end of copied text >  < from: CT Head No Cont (09.18.20 @ 14:06) >  EXAM:  CT BRAIN                            PROCEDURE DATE:  09/18/2020            INTERPRETATION:  CLINICAL INDICATION: 75-year-old female, found down    Technique: Noncontrast CT of the head was performed.    Multiple contiguous axial images were acquired from the skull base to the vertex without the administration of intravenous contrast. Coronal and sagittal reformations were made.    COMPARISON: None.    FINDINGS:  Moderate enlargement of ventricles and sulci consistent with volume loss. There are hemispheric white matter areas of low attenuation which are nonspecific but likely related to sequelae of microvascular disease with age indeterminate lacunar infarcts, MRI more sensitive for new ischemia, may be obtained for assessment as clinically warranted. Atherosclerotic calcification of the carotid siphons. There is no intraparenchymal hematoma, mass effect or midline shift. No abnormal extra-axial fluid collections are present. The basal cisterns are patent.    The calvarium is intact, with foci of lucency and sclerosis. Orbits, paranasal sinuses and tympanomastoid cavities appear free of acute disease. Incidental unfused posterior arch C1.    IMPRESSION:    Volume loss, microvascular disease, age indeterminate lacunar infarcts, no acute hemorrhage or midline shift. If symptoms persist consider follow-up head CT or MR if no contraindications.            < end of copied text >      MEDICATIONS  (STANDING):  aspirin enteric coated 81 milliGRAM(s) Oral daily  dextrose 5% + sodium chloride 0.45%. 1000 milliLiter(s) (100 mL/Hr) IV Continuous <Continuous>  heparin  Infusion. 800 Unit(s)/Hr (8 mL/Hr) IV Continuous <Continuous>  metoprolol succinate ER 25 milliGRAM(s) Oral daily    MEDICATIONS  (PRN):  aluminum hydroxide/magnesium hydroxide/simethicone Suspension 30 milliLiter(s) Oral every 6 hours PRN Dyspepsia  heparin   Injectable 5000 Unit(s) IV Push every 6 hours PRN For aPTT less than 40  heparin   Injectable 2500 Unit(s) IV Push every 6 hours PRN For aPTT between 40 - 57      Elderly WF in bed, thin, calm, cooperative, alert and oriented x 3.  No psychomotor abnormalities. Insight and judgment are fair. Speech is coherent with slow rate and low volume. However, she struggles to give history such as dose of Lithium. No hallucinations nor delusions. The patient denied suicidal and homicidal ideation and plan. Mood is euthymic and affect constricted.  Attention and concentration,  short term memory, and long term memory impaired. She forgot I told her I was a psychiatrist. She forgot she expressed suicidal thoughts in the ER. She forgot what she was going to say and would stop in midsentence. She did name correctly the current President of the [place].     Suicidal risk assessment  Risk factors include cognitive impairment, Bipolar disorder, living alone  Protective factors include no plan/past attempts/guns/substance abuse.

## 2020-09-21 NOTE — PHYSICAL THERAPY INITIAL EVALUATION ADULT - ADDITIONAL COMMENTS
As per pt, pt lives in a private house alone and no steps to enter. PTA pt was independent w/ all mobility and ADLs.

## 2020-09-21 NOTE — PROGRESS NOTE ADULT - PROBLEM SELECTOR PLAN 1
likely pre renal vs atn  lincoln draining urine well  cr improving and na higher--> excellent urineoutput-->? post atn lindais  Would like to keep I's/O's even  inc ivf d5 1/2nacl from 80cc to 100cc  monitor

## 2020-09-21 NOTE — PHYSICAL THERAPY INITIAL EVALUATION ADULT - DISCHARGE DISPOSITION, PT EVAL
Anticipate subacute rehab, if pt is to go home pt will require assist w/ all ADLS and mobility, Home physical therapy and RW.

## 2020-09-21 NOTE — PROGRESS NOTE ADULT - SUBJECTIVE AND OBJECTIVE BOX
Patient is a 75y old  Female who presents with a chief complaint of The patient is a 75y Female was BIBA because of AMS. (21 Sep 2020 15:38)      SUBJECTIVE / OVERNIGHT EVENTS:    Events noted.  CONSTITUTIONAL: No fever,  or fatigue  RESPIRATORY: No cough, wheezing,  No shortness of breath  CARDIOVASCULAR: No chest pain, palpitations,  GASTROINTESTINAL: No abdominal or epigastric pain.   NEUROLOGICAL: No headaches,     MEDICATIONS  (STANDING):  ARIPiprazole 5 milliGRAM(s) Oral daily  aspirin enteric coated 81 milliGRAM(s) Oral daily  dextrose 5% + sodium chloride 0.45%. 1000 milliLiter(s) (100 mL/Hr) IV Continuous <Continuous>  heparin  Infusion. 800 Unit(s)/Hr (8 mL/Hr) IV Continuous <Continuous>  metoprolol succinate ER 25 milliGRAM(s) Oral daily    MEDICATIONS  (PRN):  aluminum hydroxide/magnesium hydroxide/simethicone Suspension 30 milliLiter(s) Oral every 6 hours PRN Dyspepsia  haloperidol    Injectable 1 milliGRAM(s) IV Push every 6 hours PRN Agitation  heparin   Injectable 5000 Unit(s) IV Push every 6 hours PRN For aPTT less than 40  heparin   Injectable 2500 Unit(s) IV Push every 6 hours PRN For aPTT between 40 - 57        CAPILLARY BLOOD GLUCOSE        I&O's Summary    20 Sep 2020 07:01  -  21 Sep 2020 07:00  --------------------------------------------------------  IN: 1338 mL / OUT: 2700 mL / NET: -1362 mL    21 Sep 2020 07:01  -  21 Sep 2020 20:58  --------------------------------------------------------  IN: 430 mL / OUT: 900 mL / NET: -470 mL        PHYSICAL EXAM:    NECK: Supple, No JVD  CHEST/LUNG: Clear to auscultation bilaterally; No wheezing.  HEART: Regular rate and rhythm; No murmurs, rubs, or gallops  ABDOMEN: Soft, Nontender, Nondistended; Bowel sounds present  EXTREMITIES:   No edema  NEUROLOGY: AAO       LABS:                        8.8    6.96  )-----------( 116      ( 21 Sep 2020 06:06 )             28.6     09-21    150<H>  |  119<H>  |  50<H>  ----------------------------<  113<H>  3.7   |  24  |  1.85<H>    Ca    8.8      21 Sep 2020 06:06  Phos  2.4     09-19  Mg     1.9     09-21      PTT - ( 21 Sep 2020 12:38 )  PTT:81.6 sec  CARDIAC MARKERS ( 20 Sep 2020 05:56 )  x     / x     / 87 U/L / x     / x            CAPILLARY BLOOD GLUCOSE        09-18 @ 17:23  Culture-urine --  Culture results   <10,000 CFU/mL Normal Urogenital Carrie  method type --  Organism --  Organism Identification --  Specimen source .Urine Clean Catch (Midstream)  09-18 @ 15:00  Culture-urine --  Culture results   No growth to date.  method type --  Organism --  Organism Identification --  Specimen source .Blood Blood-Peripheral           09-18 @ 17:23  Culture blood --  Culture results   <10,000 CFU/mL Normal Urogenital Carrie  Gram stain --  Gram stain blood --  Method type --  Organism --  Organism identification --  Specimen source .Urine Clean Catch (Midstream)   09-18 @ 15:00  Culture blood --  Culture results   No growth to date.  Gram stain --  Gram stain blood --  Method type --  Organism --  Organism identification --  Specimen source .Blood Blood-Peripheral      RADIOLOGY & ADDITIONAL TESTS:    Imaging Personally Reviewed:    Consultant(s) Notes Reviewed:      Care Discussed with Consultants/Other Providers:    Mark Koo MD, CMD, FACP    [address] [place] [address]  Office Tel: [phone]  Cell: [phone]

## 2020-09-21 NOTE — PROGRESS NOTE ADULT - SUBJECTIVE AND OBJECTIVE BOX
S: Denies chest pain, palpitations, or SOB. Review of systems otherwise (-)      MEDICATIONS  (STANDING):  aspirin enteric coated 81 milliGRAM(s) Oral daily  dextrose 5% + sodium chloride 0.45%. 1000 milliLiter(s) (80 mL/Hr) IV Continuous <Continuous>  heparin  Infusion. 800 Unit(s)/Hr (8 mL/Hr) IV Continuous <Continuous>  metoprolol succinate ER 25 milliGRAM(s) Oral daily    MEDICATIONS  (PRN):  aluminum hydroxide/magnesium hydroxide/simethicone Suspension 30 milliLiter(s) Oral every 6 hours PRN Dyspepsia  heparin   Injectable 5000 Unit(s) IV Push every 6 hours PRN For aPTT less than 40  heparin   Injectable 2500 Unit(s) IV Push every 6 hours PRN For aPTT between 40 - 57      LABS:                            8.8    6.96  )-----------( 116      ( 21 Sep 2020 06:06 )             28.6     Hemoglobin: 8.8 g/dL (09-21 @ 06:06)  Hemoglobin: 8.5 g/dL (09-20 @ 21:53)  Hemoglobin: 8.6 g/dL (09-20 @ 05:56)  Hemoglobin: 10.8 g/dL (09-19 @ 07:17)  Hemoglobin: 12.5 g/dL (09-18 @ 12:49)    09-21    150<H>  |  119<H>  |  50<H>  ----------------------------<  113<H>  3.7   |  24  |  1.85<H>    Ca    8.8      21 Sep 2020 06:06  Phos  2.4     09-19  Mg     1.9     09-21      Creatinine Trend: 1.85<--, 2.51<--, 2.83<--, 3.18<--, 2.46<--, 3.70<--   PTT - ( 21 Sep 2020 06:06 )  PTT:102.0 sec  CARDIAC MARKERS ( 20 Sep 2020 05:56 )  x     / x     / 87 U/L / x     / x      CARDIAC MARKERS ( 19 Sep 2020 07:16 )  x     / x     / 169 U/L / x     / x      CARDIAC MARKERS ( 18 Sep 2020 12:49 )  x     / x     / 334 U/L / x     / x              09-20-20 @ 07:01  -  09-21-20 @ 07:00  --------------------------------------------------------  IN: 1338 mL / OUT: 2700 mL / NET: -1362 mL    09-21-20 @ 07:01  -  09-21-20 @ 11:51  --------------------------------------------------------  IN: 305 mL / OUT: 700 mL / NET: -395 mL        PHYSICAL EXAM  Vital Signs Last 24 Hrs  T(C): 36.2 (21 Sep 2020 04:49), Max: 36.7 (20 Sep 2020 16:01)  T(F): 97.2 (21 Sep 2020 04:49), Max: 98.1 (20 Sep 2020 16:01)  HR: 80 (21 Sep 2020 04:49) (68 - 88)  BP: 116/59 (21 Sep 2020 04:49) (116/59 - 146/66)  BP(mean): --  RR: 18 (21 Sep 2020 04:49) (18 - 18)  SpO2: 97% (21 Sep 2020 04:49) (97% - 100%)        Gen: Appears well in NAD  HEENT:  (-)icterus (-)pallor  CV: N S1 S2 1/6 DAVID (+)2 Pulses B/l  Resp:  Clear to auscultation B/L, normal effort  GI: (+) BS Soft, NT, ND  Lymph:  (-)Edema, (-)obvious lymphadenopathy  Skin: Warm to touch, Normal turgor  Psych: Appropriate mood and affect      TELEMETRY: SR 70-90, brief PAT overnight    < from: CT Head No Cont (09.18.20 @ 14:06) >    IMPRESSION:    Volume loss, microvascular disease, age indeterminate lacunar infarcts, no acute hemorrhage or midline shift. If symptoms persist consider follow-up head CT or MR if no contraindications.    < end of copied text >      ASSESSMENT/PLAN: 75y Female found on the floor at home, confused in rapid afib and renal failure with unknown baseline.    - Continue AC with hep gtt for CVA prevention  - Continue Toprol XL 25mg daily  - Check TTE to eval LV function and r/o structural abnormalities  - Renal f/u - MAYELIN improving on IVF  - Further recs pending above    Eben Sutton PA-C  Pager: [phone]

## 2020-09-22 NOTE — PROGRESS NOTE ADULT - SUBJECTIVE AND OBJECTIVE BOX
S: Denies chest pain, palpitations, or SOB. Review of systems otherwise.  Restarted on Aripiprazole.    aluminum hydroxide/magnesium hydroxide/simethicone Suspension 30 milliLiter(s) Oral every 6 hours PRN  ARIPiprazole 5 milliGRAM(s) Oral daily  aspirin enteric coated 81 milliGRAM(s) Oral daily  dextrose 5% + sodium chloride 0.45%. 1000 milliLiter(s) IV Continuous <Continuous>  haloperidol    Injectable 1 milliGRAM(s) IV Push every 6 hours PRN  heparin   Injectable 5000 Unit(s) IV Push every 6 hours PRN  heparin   Injectable 2500 Unit(s) IV Push every 6 hours PRN  heparin  Infusion. 800 Unit(s)/Hr IV Continuous <Continuous>  metoprolol succinate ER 25 milliGRAM(s) Oral daily                          8.4    7.01  )-----------( 105      ( 22 Sep 2020 06:07 )             27.7       09-22    151<H>  |  119<H>  |  26<H>  ----------------------------<  110<H>  3.3<L>   |  24  |  1.49<H>    Ca    8.7      22 Sep 2020 06:07  Mg     1.8     09-22        CARDIAC MARKERS ( 20 Sep 2020 05:56 )  x     / x     / 87 U/L / x     / x            T(C): 36.7 (09-22-20 @ 08:15), Max: 36.9 (09-22-20 @ 00:21)  HR: 74 (09-22-20 @ 08:15) (74 - 90)  BP: 130/77 (09-22-20 @ 08:15) (113/67 - 130/77)  RR: 18 (09-22-20 @ 08:15) (18 - 18)  SpO2: 99% (09-22-20 @ 08:15) (96% - 99%)  Wt(kg): --    I&O's Summary    21 Sep 2020 07:01  -  22 Sep 2020 07:00  --------------------------------------------------------  IN: 430 mL / OUT: 3400 mL / NET: -2970 mL        Gen: Appears well in NAD  HEENT:  (-)icterus (-)pallor  CV: N S1 S2 1/6 DAVID (+)2 Pulses B/l  Resp:  Clear to auscultation B/L, normal effort  GI: (+) BS Soft, NT, ND  Lymph:  (-)Edema, (-)obvious lymphadenopathy  Skin: Warm to touch, Normal turgor  Psych: Appropriate mood and affect      TELEMETRY: SR 70-90, brief PAT overnight    < from: CT Head No Cont (09.18.20 @ 14:06) >    IMPRESSION:    Volume loss, microvascular disease, age indeterminate lacunar infarcts, no acute hemorrhage or midline shift. If symptoms persist consider follow-up head CT or MR if no contraindications.    < end of copied text >    ECHO:  EF is hyperdynamic (>75%), normal valves, normal right heart function.    ASSESSMENT/PLAN: 75y Female found on the floor at home, confused in rapid afib and renal failure with unknown baseline.  EF is hyperdynamic on Echo.      -  If no further ivnasive testing planned, transition heparin infusion to Apixaban 5mg BID.  If kidney function does not improve further by discharge, we may decrease the dose  - Continue Toprol XL 25mg daily  - Replace K to 4-4.5 and Mg to 2.  Continue hydration re: hyperdynamic LV (IV + PO)    Babar Saavedra M.D.  Cardiac Electrophysiology  [phone]

## 2020-09-22 NOTE — PROGRESS NOTE ADULT - PROBLEM SELECTOR PLAN 1
likely pre renal +/- atn + obstruction   lincoln draining urine well--   cr improving and na higher--> excellent urineoutput-->? post atn/obstructive diuresis  Would like to keep I's/O's even  change ivf to d5 + 40meq kcl @ 100cc  monitor    Hypokalemia - kcl supplementation  monitor k

## 2020-09-22 NOTE — PROGRESS NOTE ADULT - SUBJECTIVE AND OBJECTIVE BOX
Patient is a 75y old  Female who presents with a chief complaint of The patient is a 75y Female was BIBA because of AMS. (21 Sep 2020 15:38)      SUBJECTIVE / OVERNIGHT EVENTS:    Events noted.  CONSTITUTIONAL: No fever,  or fatigue  RESPIRATORY: No cough, wheezing,  No shortness of breath  CARDIOVASCULAR: No chest pain, palpitations,  GASTROINTESTINAL: No abdominal or epigastric pain.   NEUROLOGICAL: No headaches,     MEDICATIONS  (STANDING):  ARIPiprazole 5 milliGRAM(s) Oral daily  aspirin enteric coated 81 milliGRAM(s) Oral daily  dextrose 5% + sodium chloride 0.45%. 1000 milliLiter(s) (100 mL/Hr) IV Continuous <Continuous>  heparin  Infusion. 800 Unit(s)/Hr (8 mL/Hr) IV Continuous <Continuous>  metoprolol succinate ER 25 milliGRAM(s) Oral daily    MEDICATIONS  (PRN):  aluminum hydroxide/magnesium hydroxide/simethicone Suspension 30 milliLiter(s) Oral every 6 hours PRN Dyspepsia  haloperidol    Injectable 1 milliGRAM(s) IV Push every 6 hours PRN Agitation  heparin   Injectable 5000 Unit(s) IV Push every 6 hours PRN For aPTT less than 40  heparin   Injectable 2500 Unit(s) IV Push every 6 hours PRN For aPTT between 40 - 57        CAPILLARY BLOOD GLUCOSE        I&O's Summary    20 Sep 2020 07:01  -  21 Sep 2020 07:00  --------------------------------------------------------  IN: 1338 mL / OUT: 2700 mL / NET: -1362 mL    21 Sep 2020 07:01  -  21 Sep 2020 20:58  --------------------------------------------------------  IN: 430 mL / OUT: 900 mL / NET: -470 mL        PHYSICAL EXAM:    NECK: Supple, No JVD  CHEST/LUNG: Clear to auscultation bilaterally; No wheezing.  HEART: Regular rate and rhythm; No murmurs, rubs, or gallops  ABDOMEN: Soft, Nontender, Nondistended; Bowel sounds present  EXTREMITIES:   No edema  NEUROLOGY: AAO       LABS:                        8.8    6.96  )-----------( 116      ( 21 Sep 2020 06:06 )             28.6     09-21    150<H>  |  119<H>  |  50<H>  ----------------------------<  113<H>  3.7   |  24  |  1.85<H>    Ca    8.8      21 Sep 2020 06:06  Phos  2.4     09-19  Mg     1.9     09-21      PTT - ( 21 Sep 2020 12:38 )  PTT:81.6 sec  CARDIAC MARKERS ( 20 Sep 2020 05:56 )  x     / x     / 87 U/L / x     / x            CAPILLARY BLOOD GLUCOSE        09-18 @ 17:23  Culture-urine --  Culture results   <10,000 CFU/mL Normal Urogenital Carrie  method type --  Organism --  Organism Identification --  Specimen source .Urine Clean Catch (Midstream)  09-18 @ 15:00  Culture-urine --  Culture results   No growth to date.  method type --  Organism --  Organism Identification --  Specimen source .Blood Blood-Peripheral           09-18 @ 17:23  Culture blood --  Culture results   <10,000 CFU/mL Normal Urogenital Carrie  Gram stain --  Gram stain blood --  Method type --  Organism --  Organism identification --  Specimen source .Urine Clean Catch (Midstream)   09-18 @ 15:00  Culture blood --  Culture results   No growth to date.  Gram stain --  Gram stain blood --  Method type --  Organism --  Organism identification --  Specimen source .Blood Blood-Peripheral      RADIOLOGY & ADDITIONAL TESTS:    Imaging Personally Reviewed:    Consultant(s) Notes Reviewed:      Care Discussed with Consultants/Other Providers:    Mark Koo MD, CMD, FACP    [address] [address]  Office Tel: [phone]  Cell: [phone]

## 2020-09-22 NOTE — PROGRESS NOTE ADULT - SUBJECTIVE AND OBJECTIVE BOX
Events noted. Uneventful overnight and today. Poor appetite. No agitation per NP. Started on Abilify 5mg/day. Her psychiatrist, Dr. Daniel Wahl, did not return my telephone call.      Vital Signs Last 24 Hrs  T(C): 36.4 (22 Sep 2020 11:31), Max: 36.9 (22 Sep 2020 00:21)  T(F): 97.5 (22 Sep 2020 11:31), Max: 98.5 (22 Sep 2020 00:21)  HR: 79 (22 Sep 2020 11:31) (74 - 90)  BP: 131/69 (22 Sep 2020 11:31) (113/67 - 131/69)  BP(mean): 83 (22 Sep 2020 01:30) (83 - 83)  RR: 18 (22 Sep 2020 11:31) (18 - 18)  SpO2: 97% (22 Sep 2020 11:31) (96% - 99%)                          8.4    7.01  )-----------( 105      ( 22 Sep 2020 06:07 )             27.7       09-22    151<H>  |  119<H>  |  26<H>  ----------------------------<  110<H>  3.3<L>   |  24  |  1.49<H>    Ca    8.7      22 Sep 2020 06:07  Mg     1.8     09-22              MEDICATIONS  (STANDING):  ARIPiprazole 5 milliGRAM(s) Oral daily  aspirin enteric coated 81 milliGRAM(s) Oral daily  dextrose 5% + sodium chloride 0.45%. 1000 milliLiter(s) (100 mL/Hr) IV Continuous <Continuous>  heparin  Infusion. 800 Unit(s)/Hr (8 mL/Hr) IV Continuous <Continuous>  metoprolol succinate ER 25 milliGRAM(s) Oral daily  potassium chloride    Tablet ER 40 milliEquivalent(s) Oral every 4 hours    MEDICATIONS  (PRN):  aluminum hydroxide/magnesium hydroxide/simethicone Suspension 30 milliLiter(s) Oral every 6 hours PRN Dyspepsia  haloperidol    Injectable 1 milliGRAM(s) IV Push every 6 hours PRN Agitation  heparin   Injectable 5000 Unit(s) IV Push every 6 hours PRN For aPTT less than 40  heparin   Injectable 2500 Unit(s) IV Push every 6 hours PRN For aPTT between 40 - 57      Elderly WF in bed, calm, cooperative, alert and oriented x 3 .  No psychomotor abnormalities. Insight and judgment are fair. Speech is coherent with normal rate and volume. No hallucinations nor delusions. The patient denied suicidal and homicidal ideation and plan. Mood is euthymic and affect full range and appropriate. Attention and concentration fair but impaired short term memory as I initially told her that her psychiatrist did not return my call. She asked me a few minutes later if I heard from her psychiatrist.  Long term memory within normal limits.

## 2020-09-22 NOTE — PROGRESS NOTE ADULT - SUBJECTIVE AND OBJECTIVE BOX
Patient seen and examined  no complaints      No Known Allergies    Hospital Medications:   MEDICATIONS  (STANDING):  ARIPiprazole 5 milliGRAM(s) Oral daily  aspirin enteric coated 81 milliGRAM(s) Oral daily  dextrose 5% + sodium chloride 0.45%. 1000 milliLiter(s) (100 mL/Hr) IV Continuous <Continuous>  heparin  Infusion. 800 Unit(s)/Hr (8 mL/Hr) IV Continuous <Continuous>  metoprolol succinate ER 25 milliGRAM(s) Oral daily        VITALS:  T(F): 97.5 (20 @ 11:31), Max: 98.5 (20 @ 00:21)  HR: 79 (20 @ 11:31)  BP: 131/69 (20 @ 11:31)  RR: 18 (20 @ 11:31)  SpO2: 97% (20 @ 11:31)  Wt(kg): --     @ 07:01  -   @ 07:00  --------------------------------------------------------  IN: 430 mL / OUT: 3400 mL / NET: -2970 mL     @ 07:01  -   @ 17:00  --------------------------------------------------------  IN: 1000 mL / OUT: 0 mL / NET: 1000 mL        Physical Exam :-  Constitutional: NAD  Neck: Supple.  Respiratory: Bilateral equal breath sounds,  Cardiovascular: S1, S2 normal,  Gastrointestinal: Bowel Sounds present, soft, non tender.  Extremities: No edema  Neurological: Alert and Oriented x 1-2  Psychiatric: Normal mood, normal affect  + indwelling lincoln      LABS:      151<H>  |  119<H>  |  26<H>  ----------------------------<  110<H>  3.3<L>   |  24  |  1.49<H>    Ca    8.7      22 Sep 2020 06:07  Mg     1.8           Creatinine Trend: 1.49 <--, 1.85 <--, 2.51 <--, 2.83 <--, 3.18 <--, 2.46 <--, 3.70 <--, 3.79 <--, 2.81 <--                        8.4    7.01  )-----------( 105      ( 22 Sep 2020 06:07 )             27.7     Urine Studies:  Urinalysis Basic - ( 19 Sep 2020 10:21 )    Color: Light Yellow / Appearance: Clear / S.013 / pH:   Gluc:  / Ketone: Negative  / Bili: Negative / Urobili: <2 mg/dL   Blood:  / Protein: Trace / Nitrite: Negative   Leuk Esterase: Small / RBC: 1 /HPF / WBC 1 /HPF   Sq Epi:  / Non Sq Epi: 2 /HPF / Bacteria: Many      Osmolality, Random Urine: 350 mosm/Kg ( @ 10:21)  Sodium, Random Urine: <35 mmol/L ( @ 07:22)  Potassium, Random Urine: 28 mmol/L ( @ 07:22)  Creatinine, Random Urine: 78 mg/dL ( @ 07:22)  Protein/Creatinine Ratio Calculation: 0.2 Ratio ( @ 07:22)    RADIOLOGY & ADDITIONAL STUDIES:

## 2020-09-23 NOTE — SWALLOW BEDSIDE ASSESSMENT ADULT - ASPIRATION PRECAUTIONS
Monitor for s/s aspiration/laryngeal penetration. If noted:  D/C p.o. intake, provide non-oral nutrition/hydration/meds, and contact this service @ [phone]

## 2020-09-23 NOTE — PROGRESS NOTE ADULT - SUBJECTIVE AND OBJECTIVE BOX
S: Denies chest pain, palpitations, or SOB. Review of systems otherwise.  Restarted on Aripiprazole.  Feeling well.    aluminum hydroxide/magnesium hydroxide/simethicone Suspension 30 milliLiter(s) Oral every 6 hours PRN  apixaban 5 milliGRAM(s) Oral two times a day  ARIPiprazole 5 milliGRAM(s) Oral daily  aspirin enteric coated 81 milliGRAM(s) Oral daily  dextrose 5% 1000 milliLiter(s) IV Continuous <Continuous>  haloperidol    Injectable 1 milliGRAM(s) IV Push every 6 hours PRN  metoprolol succinate ER 25 milliGRAM(s) Oral daily                         9.4    9.88  )-----------( 121      ( 23 Sep 2020 06:43 )             31.0       09-23    147<H>  |  114<H>  |  16  ----------------------------<  122<H>  5.2   |  26  |  1.31<H>    Ca    9.3      23 Sep 2020 06:42  Phos  1.1     09-23  Mg     2.0     09-23      T(C): 36.7 (09-23-20 @ 08:35), Max: 37.1 (09-23-20 @ 00:18)  HR: 93 (09-23-20 @ 08:35) (73 - 93)  BP: 146/68 (09-23-20 @ 08:35) (118/66 - 148/74)  RR: 18 (09-23-20 @ 08:35) (18 - 18)  SpO2: 98% (09-23-20 @ 08:35) (97% - 98%)  Wt(kg): --    I&O's Summary    22 Sep 2020 07:01  -  23 Sep 2020 07:00  --------------------------------------------------------  IN: 2520 mL / OUT: 3600 mL / NET: -1080 mL    23 Sep 2020 07:01  -  23 Sep 2020 10:38  --------------------------------------------------------  IN: 400 mL / OUT: 0 mL / NET: 400 mL      Gen: Appears well in NAD  HEENT:  (-)icterus (-)pallor  CV: N S1 S2 1/6 DAVID (+)2 Pulses B/l  Resp:  Clear to auscultation B/L, normal effort  GI: (+) BS Soft, NT, ND  Lymph:  (-)Edema, (-)obvious lymphadenopathy  Skin: Warm to touch, Normal turgor  Psych: Appropriate mood and affect      TELEMETRY: SR 70-90  ECHO:  EF is hyperdynamic (>75%), normal valves, normal right heart function.    ASSESSMENT/PLAN: 75y Female found on the floor at home, confused in rapid afib and renal failure with unknown baseline.  EF is hyperdynamic on Echo.      - AFib anticoagulation with Apixaban 5mg BID.    - Continue Toprol XL 25mg daily  - K should drift back down to <5 on its own.  Continue hydration re: hyperdynamic LV (IV + PO)    Babar Saavedra M.D.  Cardiac Electrophysiology  [phone]       S: Denies chest pain, palpitations, or SOB. Review of systems otherwise.  Restarted on Aripiprazole.  Feeling well.    aluminum hydroxide/magnesium hydroxide/simethicone Suspension 30 milliLiter(s) Oral every 6 hours PRN  apixaban 5 milliGRAM(s) Oral two times a day  ARIPiprazole 5 milliGRAM(s) Oral daily  aspirin enteric coated 81 milliGRAM(s) Oral daily  dextrose 5% 1000 milliLiter(s) IV Continuous <Continuous>  haloperidol    Injectable 1 milliGRAM(s) IV Push every 6 hours PRN  metoprolol succinate ER 25 milliGRAM(s) Oral daily                         9.4    9.88  )-----------( 121      ( 23 Sep 2020 06:43 )             31.0       09-23    147<H>  |  114<H>  |  16  ----------------------------<  122<H>  5.2   |  26  |  1.31<H>    Ca    9.3      23 Sep 2020 06:42  Phos  1.1     09-23  Mg     2.0     09-23      T(C): 36.7 (09-23-20 @ 08:35), Max: 37.1 (09-23-20 @ 00:18)  HR: 93 (09-23-20 @ 08:35) (73 - 93)  BP: 146/68 (09-23-20 @ 08:35) (118/66 - 148/74)  RR: 18 (09-23-20 @ 08:35) (18 - 18)  SpO2: 98% (09-23-20 @ 08:35) (97% - 98%)  Wt(kg): --    I&O's Summary    22 Sep 2020 07:01  -  23 Sep 2020 07:00  --------------------------------------------------------  IN: 2520 mL / OUT: 3600 mL / NET: -1080 mL    23 Sep 2020 07:01  -  23 Sep 2020 10:38  --------------------------------------------------------  IN: 400 mL / OUT: 0 mL / NET: 400 mL      Gen: Appears well in NAD  HEENT:  (-)icterus (-)pallor  CV: N S1 S2 1/6 DAVID (+)2 Pulses B/l  Resp:  Clear to auscultation B/L, normal effort  GI: (+) BS Soft, NT, ND  Lymph:  (-)Edema, (-)obvious lymphadenopathy  Skin: Warm to touch, Normal turgor  Psych: Appropriate mood and affect      TELEMETRY: SR 70-90  ECHO:  EF is hyperdynamic (>75%), normal valves, normal right heart function.    ASSESSMENT/PLAN: 75y Female found on the floor at home, confused in rapid afib and renal failure with unknown baseline.  EF is hyperdynamic on Echo.      - AFib anticoagulation with Apixaban 5mg BID.    - Continue Toprol XL 25mg daily  - K should drift back down to <5 on its own.  Continue hydration re: hyperdynamic LV (IV + PO)    On discharge, followup with Dr Miguel Angel Montes at [facility] ([address], [place] [address]) at 10AM on 10/5/2020.    Babar Saavedra M.D.  Cardiac Electrophysiology  [phone]

## 2020-09-23 NOTE — SWALLOW BEDSIDE ASSESSMENT ADULT - SWALLOW EVAL: DIAGNOSIS
Patient is a 76 y/o woman found on floor and BIBA. Pt presents with: 1) Overtly functional swallow for a pureed diet/thin liquids. Pt with expectoration of mechanical soft textures, and refusal of soft/hard solid textures. 2) Subjective complaints, unclear if consistent with an oropharyngeal dysphagia. Pt initially states problem began a few days ago, though later reported it began a few weeks ago. Patient states "I don't feel right" and "I don't feel hungry" when asked to further describe sensation. Also states she feels nauseous. Pt denies odynophagia/ globus sensation.

## 2020-09-23 NOTE — SWALLOW BEDSIDE ASSESSMENT ADULT - SLP GENERAL OBSERVATIONS
Patient encountered in bed, awake. A&Ox3, grossly. Initially with paucity of verbal output, which increased throughout assessment. Pt able to follow directives for the purpose of the evaluation. Pt endorses noticing a recent change in vocal quality, stating it sounds "husky".

## 2020-09-23 NOTE — PROGRESS NOTE ADULT - SUBJECTIVE AND OBJECTIVE BOX
Patient is a 75y old  Female who presents with a chief complaint of The patient is a 75y Female was BIBA because of AMS. (23 Sep 2020 13:04)      SUBJECTIVE / OVERNIGHT EVENTS:    Events noted.  CONSTITUTIONAL: No fever,  or fatigue  RESPIRATORY: No cough, wheezing,  No shortness of breath  CARDIOVASCULAR: No chest pain, palpitations, dizziness, or leg swelling  GASTROINTESTINAL: No abdominal or epigastric pain. No nausea, vomiting.  NEUROLOGICAL: No headaches,     MEDICATIONS  (STANDING):  apixaban 5 milliGRAM(s) Oral two times a day  ARIPiprazole 5 milliGRAM(s) Oral daily  aspirin enteric coated 81 milliGRAM(s) Oral daily  dextrose 5%. 1000 milliLiter(s) (75 mL/Hr) IV Continuous <Continuous>  metoprolol succinate ER 25 milliGRAM(s) Oral daily  sodium phosphate IVPB 15 milliMole(s) IV Intermittent once    MEDICATIONS  (PRN):  aluminum hydroxide/magnesium hydroxide/simethicone Suspension 30 milliLiter(s) Oral every 6 hours PRN Dyspepsia  haloperidol    Injectable 1 milliGRAM(s) IV Push every 6 hours PRN Agitation  ondansetron Injectable 4 milliGRAM(s) IV Push every 8 hours PRN Nausea and/or Vomiting        CAPILLARY BLOOD GLUCOSE        I&O's Summary    22 Sep 2020 07:01  -  23 Sep 2020 07:00  --------------------------------------------------------  IN: 2520 mL / OUT: 3600 mL / NET: -1080 mL    23 Sep 2020 07:01  -  23 Sep 2020 13:32  --------------------------------------------------------  IN: 1125 mL / OUT: 900 mL / NET: 225 mL        PHYSICAL EXAM:    NECK: Supple, No JVD  CHEST/LUNG: Clear to auscultation bilaterally; No wheezing.  HEART: Regular rate and rhythm; No murmurs, rubs, or gallops  ABDOMEN: Soft, Nontender, Nondistended; Bowel sounds present  EXTREMITIES:   No edema  NEUROLOGY: AAO X 3      LABS:                        9.4    9.88  )-----------( 121      ( 23 Sep 2020 06:43 )             31.0     09-23    147<H>  |  114<H>  |  16  ----------------------------<  122<H>  5.2   |  26  |  1.31<H>    Ca    9.3      23 Sep 2020 06:42  Phos  1.1     09-23  Mg     2.0     09-23      PTT - ( 23 Sep 2020 09:01 )  PTT:26.4 sec        CAPILLARY BLOOD GLUCOSE        09-18 @ 17:23  Culture-urine --  Culture results   <10,000 CFU/mL Normal Urogenital Carrie  method type --  Organism --  Organism Identification --  Specimen source .Urine Clean Catch (Midstream)  09-18 @ 15:00  Culture-urine --  Culture results   No growth to date.  method type --  Organism --  Organism Identification --  Specimen source .Blood Blood-Peripheral           09-18 @ 17:23  Culture blood --  Culture results   <10,000 CFU/mL Normal Urogenital Carrie  Gram stain --  Gram stain blood --  Method type --  Organism --  Organism identification --  Specimen source .Urine Clean Catch (Midstream)   09-18 @ 15:00  Culture blood --  Culture results   No growth to date.  Gram stain --  Gram stain blood --  Method type --  Organism --  Organism identification --  Specimen source .Blood Blood-Peripheral      RADIOLOGY & ADDITIONAL TESTS:    Imaging Personally Reviewed:    Consultant(s) Notes Reviewed:      Care Discussed with Consultants/Other Providers:    Mark Koo MD, CMD, FACP    [address] [place] [address]  Office Tel: [phone]  Cell: [phone]

## 2020-09-23 NOTE — PROGRESS NOTE ADULT - PROBLEM SELECTOR PLAN 1
likely pre renal +/- atn + obstruction   lincoln discontinues--> TOV  cr improving and na improving--> excellent urineoutput  Would like to keep I's/O's even  change ivf to d5  1/2 nacl @ 75 cc  monitor    Hypokalemia - improved--> however k is high now  d/c ivf with kcl as above  trend k    Hypophosphatemia-- supplement with naphos iv x 1  trend phos likely pre renal +/- atn + obstruction   lincoln discontinues--> TOV  cr improving and na improving--> excellent urineoutput  Would like to keep I's/O's even  change ivf to d5 @ 75 cc  monitor    Hypokalemia - improved--> however k is high now  d/c ivf with kcl as above  trend k    Hypophosphatemia-- supplement with naphos iv x 1  trend phos

## 2020-09-23 NOTE — SWALLOW BEDSIDE ASSESSMENT ADULT - SWALLOW EVAL: RECOMMENDED FEEDING/EATING TECHNIQUES
Spoke with patient last week and she expressed interest in obtaining a home monitor to check her own INR.  Writer explained to patient that the paperwork will be filled out and will be faxed to Dr. Richmond's office for him to sign.  Also explained to patient that [employer] will reach out to her to inform her if she is qualified for a home monitor.  Informed patient that it may take up to one month to hear from company.  Patient verbalized understanding.      Paperwork filled out and faxed to Dr. Richmond's office.    
allow for swallow between intakes/small sips/bites/maintain upright posture during/after eating for 30 mins

## 2020-09-23 NOTE — PROGRESS NOTE ADULT - SUBJECTIVE AND OBJECTIVE BOX
Patient  seen and examined  no complaint   lincoln removed-- tov    No Known Allergies    Hospital Medications:   MEDICATIONS  (STANDING):  apixaban 5 milliGRAM(s) Oral two times a day  ARIPiprazole 5 milliGRAM(s) Oral daily  aspirin enteric coated 81 milliGRAM(s) Oral daily  dextrose 5% 1000 milliLiter(s) (100 mL/Hr) IV Continuous <Continuous>  metoprolol succinate ER 25 milliGRAM(s) Oral daily        VITALS:  T(F): 98 (20 @ 08:35), Max: 98.7 (20 @ 00:18)  HR: 93 (20 @ 08:35)  BP: 146/68 (20 @ 08:35)  RR: 18 (20 @ 08:35)  SpO2: 98% (20 @ 08:35)  Wt(kg): --     @ 07:01  -   @ 07:00  --------------------------------------------------------  IN: 2520 mL / OUT: 3600 mL / NET: -1080 mL     @ 07:01  -   @ 12:02  --------------------------------------------------------  IN: 500 mL / OUT: 900 mL / NET: -400 mL    Physical Exam :-  Constitutional: NAD  Neck: Supple.  Respiratory: Bilateral equal breath sounds,  Cardiovascular: S1, S2 normal,  Gastrointestinal: Bowel Sounds present, soft, non tender.  Extremities: No edema  Neurological: Alert and Oriented x 1-2  Psychiatric: Normal mood, normal affect  + indwelling lincoln    LABS:      147<H>  |  114<H>  |  16  ----------------------------<  122<H>  5.2   |  26  |  1.31<H>    Ca    9.3      23 Sep 2020 06:42  Phos  1.1       Mg     2.0           Creatinine Trend: 1.31 <--, 1.49 <--, 1.85 <--, 2.51 <--, 2.83 <--, 3.18 <--, 2.46 <--, 3.70 <--, 3.79 <--, 2.81 <--                        9.4    9.88  )-----------( 121      ( 23 Sep 2020 06:43 )             31.0     Urine Studies:  Urinalysis Basic - ( 19 Sep 2020 10:21 )    Color: Light Yellow / Appearance: Clear / S.013 / pH:   Gluc:  / Ketone: Negative  / Bili: Negative / Urobili: <2 mg/dL   Blood:  / Protein: Trace / Nitrite: Negative   Leuk Esterase: Small / RBC: 1 /HPF / WBC 1 /HPF   Sq Epi:  / Non Sq Epi: 2 /HPF / Bacteria: Many      Osmolality, Random Urine: 350 mosm/Kg ( @ 10:21)  Sodium, Random Urine: <35 mmol/L ( @ 07:22)  Potassium, Random Urine: 28 mmol/L ( @ 07:22)  Creatinine, Random Urine: 78 mg/dL ( @ 07:22)  Protein/Creatinine Ratio Calculation: 0.2 Ratio ( @ 07:22)    RADIOLOGY & ADDITIONAL STUDIES:

## 2020-09-23 NOTE — PROGRESS NOTE ADULT - SUBJECTIVE AND OBJECTIVE BOX
Events noted. No agitation per RN. Poor appetite. Sleeps well. I spoke with Dr. Daniel Luis, her last psychiatrist. He gives the following history: Pt. suffers from Bipolar disorder. Never suicidal nor did she ever become psychiatrically hospitalized. He says she was last depressed two years ago and then prescribed Lithium 600mg/day. Failed trials of Olanzapine and Latuda. Baseline Creatinine is 1.23. Cognitively intact at baseline. High functioning. On Lithium on and off for years. The NP asked me to assess the pt's capacity to make healthcare proxy decision and financial power of .       Vital Signs Last 24 Hrs  T(C): 36.7 (23 Sep 2020 11:55), Max: 37.1 (23 Sep 2020 00:18)  T(F): 98.1 (23 Sep 2020 11:55), Max: 98.7 (23 Sep 2020 00:18)  HR: 79 (23 Sep 2020 11:55) (73 - 93)  BP: 134/68 (23 Sep 2020 11:55) (118/66 - 148/74)  BP(mean): --  RR: 18 (23 Sep 2020 11:55) (18 - 18)  SpO2: 97% (23 Sep 2020 11:55) (97% - 98%)                          9.4    9.88  )-----------( 121      ( 23 Sep 2020 06:43 )             31.0       09-23    147<H>  |  114<H>  |  16  ----------------------------<  122<H>  5.2   |  26  |  1.31<H>    Ca    9.3      23 Sep 2020 06:42  Phos  1.1     09-23  Mg     2.0     09-23                MEDICATIONS  (STANDING):  apixaban 5 milliGRAM(s) Oral two times a day  ARIPiprazole 5 milliGRAM(s) Oral daily  aspirin enteric coated 81 milliGRAM(s) Oral daily  dextrose 5%. 1000 milliLiter(s) (75 mL/Hr) IV Continuous <Continuous>  metoprolol succinate ER 25 milliGRAM(s) Oral daily  sodium phosphate IVPB 15 milliMole(s) IV Intermittent once    MEDICATIONS  (PRN):  aluminum hydroxide/magnesium hydroxide/simethicone Suspension 30 milliLiter(s) Oral every 6 hours PRN Dyspepsia  haloperidol    Injectable 1 milliGRAM(s) IV Push every 6 hours PRN Agitation  ondansetron Injectable 4 milliGRAM(s) IV Push every 8 hours PRN Nausea and/or Vomiting      Elderly WF in bed, calm, cooperative, alert and oriented x 3 though she took a long time to recall the current year .  No psychomotor abnormalities. Insight and judgment are fair. She is recognizing a need for a power of  for healthcare and finances. However, she is unsure who that person would be and says she trusts her cousin Ginger, a retired nurse and also trusts her next door neighbor, a physician who found her lying on the floor prior to this admission. Speech is coherent with normal rate and volume. No hallucinations nor delusions. The patient denied suicidal and homicidal ideation and plan. Mood is "ok" and affect full range and appropriate. Attention and concentration, short term memory, and long term memory within normal limits.

## 2020-09-23 NOTE — SWALLOW BEDSIDE ASSESSMENT ADULT - SLP PERTINENT HISTORY OF CURRENT PROBLEM
Patient is 76 yo who was found on floor by EMS. neighbor called because mail was piling up. Dog in house with feces all over. Unknown down time but seems long. Patient speaking but not in response to questions. Looks very dry. No obvious painful or deformed parts. Patient with hx of bipolar. on Li and buproprion. Confused in rapid afib and renal failure with unknown baseline. Psych consulted for mood disorder. PT with recommendation for subacute rehab.

## 2020-09-24 NOTE — PROGRESS NOTE ADULT - SUBJECTIVE AND OBJECTIVE BOX
Patient seen and examined  no complaints  passed TOV   No Known Allergies    Hospital Medications:   MEDICATIONS  (STANDING):  apixaban 5 milliGRAM(s) Oral two times a day  ARIPiprazole 5 milliGRAM(s) Oral daily  aspirin enteric coated 81 milliGRAM(s) Oral daily  dextrose 5%. 1000 milliLiter(s) (75 mL/Hr) IV Continuous <Continuous>  metoprolol succinate ER 25 milliGRAM(s) Oral daily  mirtazapine 15 milliGRAM(s) Oral at bedtime        VITALS:  T(F): 98.2 (20 @ 11:55), Max: 99.2 (20 @ 17:25)  HR: 88 (20 @ 11:55)  BP: 128/73 (20 @ 11:55)  RR: 18 (20 @ 11:55)  SpO2: 100% (20 @ 11:55)  Wt(kg): --     @ 07:01  -   @ 07:00  --------------------------------------------------------  IN: 2475 mL / OUT: 3000 mL / NET: -525 mL     @ 07:01  -   @ 15:24  --------------------------------------------------------  IN: 120 mL / OUT: 1100 mL / NET: -980 mL      Physical Exam :-  Constitutional: NAD  Neck: Supple.  Respiratory: Bilateral equal breath sounds,  Cardiovascular: S1, S2 normal,  Gastrointestinal: Bowel Sounds present, soft, non tender.  Extremities: No edema  Neurological: Alert and Oriented x 1-2  Psychiatric: Normal mood, normal affect    LABS:      143  |  110<H>  |  16  ----------------------------<  113<H>  5.3   |  23  |  1.36<H>    Ca    9.2      24 Sep 2020 10:36  Phos  2.5       Mg     2.2           Creatinine Trend: 1.36 <--, 1.31 <--, 1.49 <--, 1.85 <--, 2.51 <--, 2.83 <--, 3.18 <--, 2.46 <--, 3.70 <--, 3.79 <--, 2.81 <--                        10.2   10.33 )-----------( 128      ( 24 Sep 2020 10:36 )             34.2     Urine Studies:  Urinalysis Basic - ( 19 Sep 2020 10:21 )    Color: Light Yellow / Appearance: Clear / S.013 / pH:   Gluc:  / Ketone: Negative  / Bili: Negative / Urobili: <2 mg/dL   Blood:  / Protein: Trace / Nitrite: Negative   Leuk Esterase: Small / RBC: 1 /HPF / WBC 1 /HPF   Sq Epi:  / Non Sq Epi: 2 /HPF / Bacteria: Many      Osmolality, Random Urine: 350 mosm/Kg ( @ 10:21)  Sodium, Random Urine: <35 mmol/L ( @ 07:22)  Potassium, Random Urine: 28 mmol/L ( @ 07:22)  Creatinine, Random Urine: 78 mg/dL ( @ 07:22)  Protein/Creatinine Ratio Calculation: 0.2 Ratio ( @ 07:22)    RADIOLOGY & ADDITIONAL STUDIES:

## 2020-09-24 NOTE — DIETITIAN INITIAL EVALUATION ADULT. - REASON INDICATOR FOR ASSESSMENT
Pt seen for routine length of stay on 6TOW.  Information obtained from: RN, electronic medical record (pt A&Ox4, does not participate verbally in RD interview this AM. Per RN, pt chooses when she wants to speak or not; psych history noted)

## 2020-09-24 NOTE — DIETITIAN INITIAL EVALUATION ADULT. - ADD RECOMMEND
1. Continue to provide current diet order, no therapeutic diet restrictions indicated at this time. Texture per team. 2. Provide just 1 ensure enlive daily as pt not accepting supplement at this time- will keep one available to her 3. Recommend addition of multivitamin and thiamine if no contraindications 4. Will continue to monitor nutrient intake, wt, labs, f/u per protocol

## 2020-09-24 NOTE — PROGRESS NOTE ADULT - SUBJECTIVE AND OBJECTIVE BOX
Patient is a 75y old  Female who presents with a chief complaint of The patient is a 75y Female was BIBA because of AMS. (24 Sep 2020 10:31)      SUBJECTIVE / OVERNIGHT EVENTS:    Events noted.  CONSTITUTIONAL: No fever,  or fatigue  RESPIRATORY: No cough, wheezing,  No shortness of breath  CARDIOVASCULAR: No chest pain, palpitations, dizziness, or leg swelling  GASTROINTESTINAL: No abdominal or epigastric pain. No nausea, vomiting.  NEUROLOGICAL: No headaches,     MEDICATIONS  (STANDING):  apixaban 5 milliGRAM(s) Oral two times a day  ARIPiprazole 5 milliGRAM(s) Oral daily  aspirin enteric coated 81 milliGRAM(s) Oral daily  dextrose 5%. 1000 milliLiter(s) (75 mL/Hr) IV Continuous <Continuous>  metoprolol succinate ER 25 milliGRAM(s) Oral daily  mirtazapine 15 milliGRAM(s) Oral at bedtime    MEDICATIONS  (PRN):  aluminum hydroxide/magnesium hydroxide/simethicone Suspension 30 milliLiter(s) Oral every 6 hours PRN Dyspepsia  haloperidol    Injectable 1 milliGRAM(s) IV Push every 6 hours PRN Agitation  ondansetron Injectable 4 milliGRAM(s) IV Push every 8 hours PRN Nausea and/or Vomiting        CAPILLARY BLOOD GLUCOSE        I&O's Summary    23 Sep 2020 07:01  -  24 Sep 2020 07:00  --------------------------------------------------------  IN: 2475 mL / OUT: 3000 mL / NET: -525 mL    24 Sep 2020 07:01  -  24 Sep 2020 14:34  --------------------------------------------------------  IN: 120 mL / OUT: 1100 mL / NET: -980 mL        PHYSICAL EXAM:    NECK: Supple, No JVD  CHEST/LUNG: Clear to auscultation bilaterally; No wheezing.  HEART: Regular rate and rhythm; No murmurs, rubs, or gallops  ABDOMEN: Soft, Nontender, Nondistended; Bowel sounds present  EXTREMITIES:   No edema  NEUROLOGY: AAO       LABS:                        10.2   10.33 )-----------( 128      ( 24 Sep 2020 10:36 )             34.2     09-24    143  |  110<H>  |  16  ----------------------------<  113<H>  5.3   |  23  |  1.36<H>    Ca    9.2      24 Sep 2020 10:36  Phos  2.5     09-24  Mg     2.2     09-24      PTT - ( 23 Sep 2020 09:01 )  PTT:26.4 sec        CAPILLARY BLOOD GLUCOSE        09-18 @ 17:23  Culture-urine --  Culture results   <10,000 CFU/mL Normal Urogenital Carrie  method type --  Organism --  Organism Identification --  Specimen source .Urine Clean Catch (Midstream)  09-18 @ 15:00  Culture-urine --  Culture results   No Growth Final  method type --  Organism --  Organism Identification --  Specimen source .Blood Blood-Peripheral           09-18 @ 17:23  Culture blood --  Culture results   <10,000 CFU/mL Normal Urogenital Carrie  Gram stain --  Gram stain blood --  Method type --  Organism --  Organism identification --  Specimen source .Urine Clean Catch (Midstream)   09-18 @ 15:00  Culture blood --  Culture results   No Growth Final  Gram stain --  Gram stain blood --  Method type --  Organism --  Organism identification --  Specimen source .Blood Blood-Peripheral      RADIOLOGY & ADDITIONAL TESTS:    Imaging Personally Reviewed:    Consultant(s) Notes Reviewed:      Care Discussed with Consultants/Other Providers:    Mark Koo MD, CMD, FACP    [address] [place] [address]  Office Tel: [phone]  Cell: [phone]

## 2020-09-24 NOTE — DIETITIAN INITIAL EVALUATION ADULT. - PERTINENT MEDS FT
MEDICATIONS  (STANDING):  apixaban 5 milliGRAM(s) Oral two times a day  ARIPiprazole 5 milliGRAM(s) Oral daily  aspirin enteric coated 81 milliGRAM(s) Oral daily  dextrose 5%. 1000 milliLiter(s) (75 mL/Hr) IV Continuous <Continuous>  metoprolol succinate ER 25 milliGRAM(s) Oral daily  mirtazapine 15 milliGRAM(s) Oral at bedtime    MEDICATIONS  (PRN):  aluminum hydroxide/magnesium hydroxide/simethicone Suspension 30 milliLiter(s) Oral every 6 hours PRN Dyspepsia  haloperidol    Injectable 1 milliGRAM(s) IV Push every 6 hours PRN Agitation  ondansetron Injectable 4 milliGRAM(s) IV Push every 8 hours PRN Nausea and/or Vomiting

## 2020-09-24 NOTE — PROGRESS NOTE ADULT - PROBLEM SELECTOR PLAN 1
likely pre renal +/- atn + obstruction   s/p lincoln- passed TOV- monitor urineoutput  cr improving and na improving--> excellent urineoutput  Would like to keep I's/O's even  d/c ivf  monitor    Hypokalemia - improved--> however k is high now  d/c ivf with kcl as above  trend k    Hypophosphatemia-- s/p naphos iv x 1  trend phos

## 2020-09-24 NOTE — PROVIDER CONTACT NOTE (OTHER) - ACTION/TREATMENT ORDERED:
RERE Peterson notified of one episode of PAT; set of vitals done as ordered. Will continue to monitor
bladder scan again at 0300  provider aware  continue to monitor for urine and assess for s/s of discomfort
continue to monitor, provider is aware. will continue cardizem drip and tele monitoring
ekg done  labs drawn, heparin drip initiated  continue to monitor

## 2020-09-24 NOTE — DIETITIAN INITIAL EVALUATION ADULT. - MALNUTRITION
given limited subjective information available and no obvious visual signs of muscle wasting or fat loss, pt does not meet criteria for malnutrition at this time

## 2020-09-24 NOTE — PROGRESS NOTE ADULT - SUBJECTIVE AND OBJECTIVE BOX
Events noted. Not eating well. Vomited once this afternoon. Slept well with Remeron. She is willing to go to rehab. No roberta.       Vital Signs Last 24 Hrs  T(C): 36.8 (24 Sep 2020 11:55), Max: 37.3 (23 Sep 2020 17:25)  T(F): 98.2 (24 Sep 2020 11:55), Max: 99.2 (23 Sep 2020 17:25)  HR: 88 (24 Sep 2020 11:55) (75 - 90)  BP: 128/73 (24 Sep 2020 11:55) (90/64 - 139/83)  BP(mean): --  RR: 18 (24 Sep 2020 11:55) (18 - 18)  SpO2: 100% (24 Sep 2020 11:55) (96% - 100%)                          10.2   10.33 )-----------( 128      ( 24 Sep 2020 10:36 )             34.2       09-24    143  |  110<H>  |  16  ----------------------------<  113<H>  5.3   |  23  |  1.36<H>    Ca    9.2      24 Sep 2020 10:36  Phos  2.5     09-24  Mg     2.2     09-24                MEDICATIONS  (STANDING):  apixaban 5 milliGRAM(s) Oral two times a day  ARIPiprazole 5 milliGRAM(s) Oral daily  aspirin enteric coated 81 milliGRAM(s) Oral daily  metoprolol succinate ER 25 milliGRAM(s) Oral daily  mirtazapine 15 milliGRAM(s) Oral at bedtime    MEDICATIONS  (PRN):  aluminum hydroxide/magnesium hydroxide/simethicone Suspension 30 milliLiter(s) Oral every 6 hours PRN Dyspepsia  haloperidol    Injectable 1 milliGRAM(s) IV Push every 6 hours PRN Agitation  ondansetron Injectable 4 milliGRAM(s) IV Push every 8 hours PRN Nausea and/or Vomiting      Elderly WF in bed, calm, cooperative, alert and oriented x 3  .  No psychomotor abnormalities. Insight and judgment are fair. She is aware of her medical and psychiatric conditions, is willing to go to rehab. She understands the reasons she needs to go to a rehab center. She can reason and weigh decisions. Speech is coherent with normal rate and volume. No hallucinations nor delusions. The patient denied suicidal and homicidal ideation and plan. Mood is "ok" and affect constricted. Attention and concentration, short term memory, and long term memory within normal limits.

## 2020-09-24 NOTE — PROVIDER CONTACT NOTE (OTHER) - SITUATION
Pt on tele; one episode of PAT x4.9sec  converted back to NSR 80s. VSS; temp: 98.3F, HR 88, BP:129/81, RR: 18 O2 sat:96% on RA; pt sleeping

## 2020-09-24 NOTE — PROGRESS NOTE ADULT - SUBJECTIVE AND OBJECTIVE BOX
S: Denies chest pain, palpitations, or SOB. Review of systems otherwise (-)      MEDICATIONS  (STANDING):  apixaban 5 milliGRAM(s) Oral two times a day  ARIPiprazole 5 milliGRAM(s) Oral daily  aspirin enteric coated 81 milliGRAM(s) Oral daily  dextrose 5%. 1000 milliLiter(s) (75 mL/Hr) IV Continuous <Continuous>  metoprolol succinate ER 25 milliGRAM(s) Oral daily  mirtazapine 15 milliGRAM(s) Oral at bedtime    MEDICATIONS  (PRN):  aluminum hydroxide/magnesium hydroxide/simethicone Suspension 30 milliLiter(s) Oral every 6 hours PRN Dyspepsia  haloperidol    Injectable 1 milliGRAM(s) IV Push every 6 hours PRN Agitation  ondansetron Injectable 4 milliGRAM(s) IV Push every 8 hours PRN Nausea and/or Vomiting      LABS:                            9.4    9.88  )-----------( 121      ( 23 Sep 2020 06:43 )             31.0     Hemoglobin: 9.4 g/dL (09-23 @ 06:43)  Hemoglobin: 8.4 g/dL (09-22 @ 06:07)  Hemoglobin: 8.8 g/dL (09-21 @ 06:06)  Hemoglobin: 8.5 g/dL (09-20 @ 21:53)  Hemoglobin: 8.6 g/dL (09-20 @ 05:56)    09-23    147<H>  |  114<H>  |  16  ----------------------------<  122<H>  5.2   |  26  |  1.31<H>    Ca    9.3      23 Sep 2020 06:42  Phos  1.1     09-23  Mg     2.0     09-23      Creatinine Trend: 1.31<--, 1.49<--, 1.85<--, 2.51<--, 2.83<--, 3.18<--             09-23-20 @ 07:01  -  09-24-20 @ 07:00  --------------------------------------------------------  IN: 2475 mL / OUT: 3000 mL / NET: -525 mL        PHYSICAL EXAM  Vital Signs Last 24 Hrs  T(C): 36.6 (24 Sep 2020 08:30), Max: 37.3 (23 Sep 2020 17:25)  T(F): 97.8 (24 Sep 2020 08:30), Max: 99.2 (23 Sep 2020 17:25)  HR: 75 (24 Sep 2020 08:30) (75 - 90)  BP: 90/64 (24 Sep 2020 08:30) (90/64 - 139/83)  BP(mean): --  RR: 18 (24 Sep 2020 08:30) (18 - 18)  SpO2: 97% (24 Sep 2020 08:30) (96% - 98%)      Gen: Appears well in NAD  HEENT:  (-)icterus (-)pallor  CV: N S1 S2 1/6 DAVID (+)2 Pulses B/l  Resp:  Clear to auscultation B/L, normal effort  GI: (+) BS Soft, NT, ND  Lymph:  (-)Edema, (-)obvious lymphadenopathy  Skin: Warm to touch, Normal turgor  Psych: Appropriate mood and affect      TELEMETRY: SR 70-80, brief PAT  ECHO:  EF is hyperdynamic (>75%), normal valves, normal right heart function.    ASSESSMENT/PLAN: 75y Female found on the floor at home, confused in rapid afib and renal failure with unknown baseline.  EF is hyperdynamic on Echo.      - AFib anticoagulation with Apixaban 5mg BID.    - Continue Toprol XL 25mg daily  - Continue hydration re: hyperdynamic LV (IV + PO)  - Follow up renal  - No further inpatient cardiac w/u needed  - On discharge, followup with Dr Miguel Angel Montes at [facility] ([address], [address]) at 10AM on 10/5/2020.    Eben Sutton PA-C  Pager: [phone]

## 2020-09-24 NOTE — DIETITIAN INITIAL EVALUATION ADULT. - OTHER INFO
Pt is 75yoM with PMHx significant for bipolar disorder, brought in by EMS for altered mental status, was on floor x days. Psychiatry following. Pt noted with MAYELIN on admission, poor po intake. Pt is s/p SLP eval 9/23 and recommended for pureed diet.    Limited subjective information available. Unknown baseline appetite and po intake.  Therapeutic Diet PTA: unable to obtain.   Nutrition Supplements PTA: none per H&P.  NKFA noted in chart.    Weight history unavailable. Dosing wt noted as 135.1 lbs.    Pt noted with poor po intake during current hospitalization per nursing flow sheet and RN this AM. Pt refusing breakfast this AM. Shakes her head "no" when asked if she tried ensure enlive supplements.  Pt has no c/o nausea, vomiting, diarrhea, or constipation noted.

## 2020-09-25 NOTE — DISCHARGE NOTE NURSING/CASE MANAGEMENT/SOCIAL WORK - NSDCFUADDAPPT_GEN_ALL_CORE_FT
Follow up with Dr Miguel Angel Montes at [facility] ([address], [address]) at 10AM on 10/5/2020.    Please follow up with psychiatry as outpatient.

## 2020-09-25 NOTE — DISCHARGE NOTE PROVIDER - HOSPITAL COURSE
78 yo who was found on floor by EMS. no known PMH. Found down, dry, demented.      Problem/Plan - 1:  ·  Problem: MAYELIN (acute kidney injury).  Plan: likely pre renal +/- atn + obstruction   s/p lincoln- passed TOV- monitor urine output  cr improving and na improving--> excellent urine output  Would like to keep I's/O's even  off ivf  monitor    Hypokalemia - improved  trend k    Hypophosphatemia-- s/p naphos iv x 1  trend phos.      Problem/Plan - 2:  ·  Problem: Hypernatremia.  Plan: improved   trend na.      Problem/Plan - 3:  ·  Problem: Metabolic acidosis.  Plan: improved  monitor.      Problem/Plan - 4:  ·  Problem: AMS (altered mental status).  Plan: per primary team.

## 2020-09-25 NOTE — PROGRESS NOTE ADULT - PROBLEM SELECTOR PROBLEM 4
AMS (altered mental status)

## 2020-09-25 NOTE — DISCHARGE NOTE NURSING/CASE MANAGEMENT/SOCIAL WORK - PATIENT PORTAL LINK FT
You can access the FollowMyHealth Patient Portal offered by [facility] by registering at the following website: [email or web address]. By joining [employer]’s FollowMyHealth portal, you will also be able to view your health information using other applications (apps) compatible with our system.

## 2020-09-25 NOTE — PROGRESS NOTE ADULT - PROBLEM SELECTOR PLAN 1
likely pre renal +/- atn + obstruction   s/p lincoln- passed TOV- monitor urineoutput  cr improving and na improving--> excellent urineoutput  Would like to keep I's/O's even  off ivf  monitor    Hypokalemia - improved  trend k    Hypophosphatemia-- s/p naphos iv x 1  trend phos

## 2020-09-25 NOTE — PROGRESS NOTE ADULT - SUBJECTIVE AND OBJECTIVE BOX
S: Denies chest pain, palpitations, or SOB. Review of systems otherwise (-)      MEDICATIONS  (STANDING):  apixaban 5 milliGRAM(s) Oral two times a day  ARIPiprazole 5 milliGRAM(s) Oral daily  aspirin enteric coated 81 milliGRAM(s) Oral daily  metoprolol succinate ER 25 milliGRAM(s) Oral daily  mirtazapine 15 milliGRAM(s) Oral at bedtime    MEDICATIONS  (PRN):  aluminum hydroxide/magnesium hydroxide/simethicone Suspension 30 milliLiter(s) Oral every 6 hours PRN Dyspepsia  haloperidol    Injectable 1 milliGRAM(s) IV Push every 6 hours PRN Agitation  ondansetron Injectable 4 milliGRAM(s) IV Push every 8 hours PRN Nausea and/or Vomiting      LABS:                            10.5   11.34 )-----------( 153      ( 25 Sep 2020 06:41 )             34.6     Hemoglobin: 10.5 g/dL (09-25 @ 06:41)  Hemoglobin: 10.2 g/dL (09-24 @ 10:36)  Hemoglobin: 9.4 g/dL (09-23 @ 06:43)  Hemoglobin: 8.4 g/dL (09-22 @ 06:07)  Hemoglobin: 8.8 g/dL (09-21 @ 06:06)    09-25    143  |  113<H>  |  15  ----------------------------<  106<H>  5.0   |  20<L>  |  1.38<H>    Ca    9.6      25 Sep 2020 06:37  Phos  2.5     09-24  Mg     2.2     09-24      Creatinine Trend: 1.38<--, 1.36<--, 1.31<--, 1.49<--, 1.85<--, 2.51<--             09-24-20 @ 07:01  -  09-25-20 @ 07:00  --------------------------------------------------------  IN: 120 mL / OUT: 1100 mL / NET: -980 mL    09-25-20 @ 07:01  -  09-25-20 @ 12:37  --------------------------------------------------------  IN: 100 mL / OUT: 0 mL / NET: 100 mL        PHYSICAL EXAM  Vital Signs Last 24 Hrs  T(C): 36.6 (25 Sep 2020 12:29), Max: 36.7 (25 Sep 2020 04:40)  T(F): 97.8 (25 Sep 2020 12:29), Max: 98.1 (25 Sep 2020 04:40)  HR: 84 (25 Sep 2020 12:29) (83 - 95)  BP: 111/72 (25 Sep 2020 12:29) (102/64 - 119/72)  BP(mean): --  RR: 18 (25 Sep 2020 12:29) (18 - 18)  SpO2: 98% (25 Sep 2020 12:29) (94% - 99%)        Gen: Appears well in NAD  HEENT:  (-)icterus (-)pallor  CV: N S1 S2 1/6 DAVID (+)2 Pulses B/l  Resp:  Clear to auscultation B/L, normal effort  GI: (+) BS Soft, NT, ND  Lymph:  (-)Edema, (-)obvious lymphadenopathy  Skin: Warm to touch, Normal turgor  Psych: Appropriate mood and affect      TELEMETRY: SR 70-80, 2 episodes of atrial tachycardia  ECHO:  EF is hyperdynamic (>75%), normal valves, normal right heart function.    ASSESSMENT/PLAN: 75y Female found on the floor at home, confused in rapid afib and renal failure with unknown baseline.  EF is hyperdynamic on Echo.      - Tele events noted with asymptomatic atrial tachycardia - continue BB  - AFib anticoagulation with Apixaban 5mg BID.    - Continue Toprol XL 25mg daily  - Continue hydration re: hyperdynamic LV (IV + PO)  - Follow up renal  - No further inpatient cardiac w/u needed  - On discharge, followup with Dr Miguel Angel Montes at [facility] ([address], [address]) at 10AM on 10/5/2020.    Eben Sutton PA-C  Pager: [phone]

## 2020-09-25 NOTE — PROGRESS NOTE ADULT - ASSESSMENT
Bipolar disorder  Resolving delirium  Poor p.o. intake  At this time, the pt. is unable to choose a healthcare and financial power of     Recommend  Continue with Abilify 5mg p.o. daily  Add Remeron 15mg p.o. qhs to help with her anorexia  Nutrition consult  Following.    Vu Bradley M.D.  Psychiatry  [phone]  
Delirium resolved  Bipolar disorder    Recommend  Psychiatrist at her rehab should follow her there  Continue with current Abilify and Remeron. No Lithium or Bupropion.   Pt. will need outpatient psychiatry followup so have  provide the pt. with referrals.    Vu Bradley M.D.  Psychiatry  [phone]  
76 yo who was found on floor by EMS. no known PMH. Found down, dry, demented. consulted for MAYELIN
78 yo who was found on floor by EMS. no known PMH. Found down, dry, demented. consulted for MAYELIN
78 yo who was found on floor by EMS. no known PMH. Found down, dry, demented. consulted for MAYELIN
Bipolar disorder  Delirium  Rule out mild dementia    Recommend  Check B12, folate, TSH  Nutrition consultation  Continue with Abilify. No antidepressant for now given her potential for developing roberta and her confusion  Following.    Vu Bradley M.D.  Psychiatry  [phone]  
Bipolar disorder in remission  Delirium resolved  No history of dementia  Based on a reasonable degree of medical certainty, the pt. has the capacity to make disposition decisions. She will need assistance when she returns home.    Recommend  Continue with Abilify and Remeron  Check/follow QTc and hold Zofran, Abilify, and Remeron if QTc is 500ms or greater.  Have psychiatrist at her rehab follow her there.    [facility] ([phone]) as pt. currently has no outpatient psychiatrist.    Vu Bradley M.D.  Psychiatry  [phone]    
The patient is a 75y Female was BIBA because of AMS.    A Fib with RVR:    Tele  Cardio f/up appreciated.  ECHO  ASA  Cw Metoprolol    Metabolic encephalopathy :  Sec to Hypernatremia/MAYELIN  IVF  Serial BMP  Nephro eval appreciated        
The patient is a 75y Female was BIBA because of AMS.    A Fib with RVR:    Tele  Cardio f/up appreciated.  ECHO  ASA/IV heparin  Cw Metoprolol    Metabolic encephalopathy :  Sec to Hypernatremia/MAYELIN  IVF  Serial BMP  Nephro f/up appreciated    MAYELIN:    Renal sono reviewed    
The patient is a 75y Female was BIBA because of AMS.    A Fib with RVR:    Tele  Cardio f/up appreciated.  ECHO reviewed.  ASA/Eliquis  Cw Metoprolol      Mood disorder:    Psych f/up noted.  Zyprexa      MAYELIN:    Nephro f/up noted.  BMP    
The patient is a 75y Female was BIBA because of AMS.    A Fib with RVR:    Tele  Cardio f/up appreciated.  ECHO reviewed.  ASA/Eliquis  Cw Metoprolol      Mood disorder:    Psych f/up noted.  Zyprexa/Remeron      MAYELIN:    Nephro f/up noted.  BMP    
The patient is a 75y Female was BIBA because of AMS.    A Fib with RVR:    Tele  Cardio f/up appreciated.  ECHO reviewed.  ASA/IV heparin  Cw Metoprolol    Metabolic encephalopathy :    Resolveing    Mood disorder:    Psych eval noted.  Zyprexa      MAYELIN:    Nephro f/up noted.  BMP    
The patient is a 75y Female was BIBA because of AMS.    A Fib with RVR:    Tele  Cardio f/up appreciated.  ECHO reviewed.  ASA/IV heparin  Cw Metoprolol    Metabolic encephalopathy :    Resolveing    Mood disorder:    Psych eval noted.  Zyprexa      MAYELIN:    Nephro f/up noted.  BMP    
76 yo who was found on floor by EMS. no known PMH. Found down, dry, demented. consulted for MAYELIN

## 2020-09-25 NOTE — PROGRESS NOTE ADULT - PROBLEM SELECTOR PLAN 4
per primary team

## 2020-09-25 NOTE — DISCHARGE NOTE PROVIDER - NSFOLLOWUPCLINICS_GEN_ALL_ED_FT
[facility]  Psychiatry  [address] [place] [address]  Phone: [phone]  Fax:   Follow Up Time:

## 2020-09-25 NOTE — PROGRESS NOTE ADULT - PROVIDER SPECIALTY LIST ADULT
Cardiology
Internal Medicine
Nephrology
Psychiatry
Psychiatry
Nephrology
Psychiatry
Psychiatry

## 2020-09-25 NOTE — PROGRESS NOTE ADULT - ATTENDING COMMENTS
seen and agree w/ PA.  is back in sinus rhythm and feeling better.  awaiting echo results.
seen and agree w/ PA. had an asymptomatic PAT run overnight.  no plan to escalate treatment for this since she was not even aware of the episode.
For any question, call:  Cell # [phone]  Pager # [phone]  Callback # [phone]
For any question, call:  Cell # [phone]  Pager # [phone]  Callback # [phone]

## 2020-09-25 NOTE — DISCHARGE NOTE PROVIDER - CARE PROVIDER_API CALL
James Montes  CARDIOLOGY  [address], [address]  [place] [address]  Phone: [phone]  Fax: [phone]  Established Patient  Scheduled Appointment: 10/05/2020 10:00 AM

## 2020-09-25 NOTE — PROGRESS NOTE ADULT - PROBLEM SELECTOR PROBLEM 3
Metabolic acidosis

## 2020-09-25 NOTE — DISCHARGE NOTE PROVIDER - NSDCMRMEDTOKEN_GEN_ALL_CORE_FT
aluminum hydroxide-magnesium hydroxide 200 mg-200 mg/5 mL oral suspension: 30 milliliter(s) orally every 6 hours, As needed, Dyspepsia  apixaban 5 mg oral tablet: 1 tab(s) orally 2 times a day  ARIPiprazole 5 mg oral tablet: 1 tab(s) orally once a day  aspirin 81 mg oral delayed release tablet: 1 tab(s) orally once a day  mirtazapine 15 mg oral tablet: 1 tab(s) orally once a day (at bedtime)  Toprol-XL 25 mg oral tablet, extended release: 1 tab(s) orally once a day

## 2020-09-25 NOTE — PROGRESS NOTE ADULT - SUBJECTIVE AND OBJECTIVE BOX
Patient seen and examined  no complaints    No Known Allergies    Hospital Medications:   MEDICATIONS  (STANDING):  apixaban 5 milliGRAM(s) Oral two times a day  ARIPiprazole 5 milliGRAM(s) Oral daily  aspirin enteric coated 81 milliGRAM(s) Oral daily  metoprolol succinate ER 25 milliGRAM(s) Oral daily  mirtazapine 15 milliGRAM(s) Oral at bedtime      VITALS:  T(F): 97.8 (20 @ 12:29), Max: 98.1 (20 @ 04:40)  HR: 101 (20 @ 13:06)  BP: 112/67 (20 @ 13:06)  RR: 18 (20 @ 12:29)  SpO2: 98% (20 @ 12:29)  Wt(kg): --     @ 07:01  -   @ 07:00  --------------------------------------------------------  IN: 120 mL / OUT: 1100 mL / NET: -980 mL     @ 07:01  -   @ 13:46  --------------------------------------------------------  IN: 100 mL / OUT: 0 mL / NET: 100 mL        Physical Exam :-  Constitutional: NAD  Neck: Supple.  Respiratory: Bilateral equal breath sounds,  Cardiovascular: S1, S2 normal,  Gastrointestinal: Bowel Sounds present, soft, non tender.  Extremities: No edema  Neurological: Alert and Oriented x 1-2  Psychiatric: Normal mood, normal affect  LABS:      143  |  113<H>  |  15  ----------------------------<  106<H>  5.0   |  20<L>  |  1.38<H>    Ca    9.6      25 Sep 2020 06:37  Phos  2.5       Mg     2.2           Creatinine Trend: 1.38 <--, 1.36 <--, 1.31 <--, 1.49 <--, 1.85 <--, 2.51 <--, 2.83 <--, 3.18 <--, 2.46 <--, 3.70 <--, 3.79 <--                        10.5   11.34 )-----------( 153      ( 25 Sep 2020 06:41 )             34.6     Urine Studies:  Urinalysis Basic - ( 19 Sep 2020 10:21 )    Color: Light Yellow / Appearance: Clear / S.013 / pH:   Gluc:  / Ketone: Negative  / Bili: Negative / Urobili: <2 mg/dL   Blood:  / Protein: Trace / Nitrite: Negative   Leuk Esterase: Small / RBC: 1 /HPF / WBC 1 /HPF   Sq Epi:  / Non Sq Epi: 2 /HPF / Bacteria: Many      Osmolality, Random Urine: 350 mosm/Kg ( @ 10:21)  Sodium, Random Urine: <35 mmol/L ( @ 07:22)  Potassium, Random Urine: 28 mmol/L ( @ 07:22)  Creatinine, Random Urine: 78 mg/dL ( @ 07:22)  Protein/Creatinine Ratio Calculation: 0.2 Ratio ( @ :22)    RADIOLOGY & ADDITIONAL STUDIES:

## 2020-09-25 NOTE — DISCHARGE NOTE PROVIDER - NSDCCPCAREPLAN_GEN_ALL_CORE_FT
PRINCIPAL DISCHARGE DIAGNOSIS  Diagnosis: MAYELIN (acute kidney injury)  Assessment and Plan of Treatment:   Likely pre renal +/- atn + obstruction   s/p lincoln- passed TOV- monitor urine output  cr improving and Na+ improving--> excellent urine output      SECONDARY DISCHARGE DIAGNOSES  Diagnosis: Atrial fibrillation  Assessment and Plan of Treatment:   Continue with Eliquis 5mg oral twice a day.  Follow up with cardiology.    Diagnosis: Hypernatremia  Assessment and Plan of Treatment:   Resolved.   Monitor sodium levels.    Diagnosis: Bipolar disorder  Assessment and Plan of Treatment:   Psychiatrist at rehab should follow as outpatient.   Continue with current Abilify and Remeron. No Lithium or Bupropion.    Diagnosis: Delirium  Assessment and Plan of Treatment:   Psychiatrist at rehab should follow as outpatient.   Continue with current Abilify and Remeron. No Lithium or Bupropion.

## 2020-09-25 NOTE — PROGRESS NOTE ADULT - SUBJECTIVE AND OBJECTIVE BOX
Events noted. The pt. feels well. She says she has an appetite today and ate. She says she slept well. No roberta nor psychosis. Tolerating Abilify and Remeron.       Vital Signs Last 24 Hrs  T(C): 36.6 (25 Sep 2020 12:29), Max: 36.7 (25 Sep 2020 04:40)  T(F): 97.8 (25 Sep 2020 12:29), Max: 98.1 (25 Sep 2020 04:40)  HR: 101 (25 Sep 2020 13:06) (83 - 101)  BP: 112/67 (25 Sep 2020 13:06) (102/64 - 119/72)  BP(mean): --  RR: 18 (25 Sep 2020 12:29) (18 - 18)  SpO2: 98% (25 Sep 2020 12:29) (94% - 99%)                          10.5   11.34 )-----------( 153      ( 25 Sep 2020 06:41 )             34.6       09-25    143  |  113<H>  |  15  ----------------------------<  106<H>  5.0   |  20<L>  |  1.38<H>    Ca    9.6      25 Sep 2020 06:37  Phos  2.5     09-24  Mg     2.2     09-24        Vitamin B12, Serum: 790 pg/mL (09.24.20 @ 12:21)   Folate, Serum: 8.5 ng/mL   Thyroid Stimulating Hormone, Serum: 1.50 uIU/mL   h< from: CT Head No Cont (09.18.20 @ 14:06) >    EXAM:  CT BRAIN                            PROCEDURE DATE:  09/18/2020            INTERPRETATION:  CLINICAL INDICATION: 75-year-old female, found down    Technique: Noncontrast CT of the head was performed.    Multiple contiguous axial images were acquired from the skull base to the vertex without the administration of intravenous contrast. Coronal and sagittal reformations were made.    COMPARISON: None.    FINDINGS:  Moderate enlargement of ventricles and sulci consistent with volume loss. There are hemispheric white matter areas of low attenuation which are nonspecific but likely related to sequelae of microvascular disease with age indeterminate lacunar infarcts, MRI more sensitive for new ischemia, may be obtained for assessment as clinically warranted. Atherosclerotic calcification of the carotid siphons. There is no intraparenchymal hematoma, mass effect or midline shift. No abnormal extra-axial fluid collections are present. The basal cisterns are patent.    The calvarium is intact, with foci of lucency and sclerosis. Orbits, paranasal sinuses and tympanomastoid cavities appear free of acute disease. Incidental unfused posterior arch C1.    IMPRESSION:    Volume loss, microvascular disease, age indeterminate lacunar infarcts, no acute hemorrhage or midline shift. If symptoms persist consider follow-up head CT or MR if no contraindications.        < end of copied text >          MEDICATIONS  (STANDING):  apixaban 5 milliGRAM(s) Oral two times a day  ARIPiprazole 5 milliGRAM(s) Oral daily  aspirin enteric coated 81 milliGRAM(s) Oral daily  metoprolol succinate ER 25 milliGRAM(s) Oral daily  mirtazapine 15 milliGRAM(s) Oral at bedtime    MEDICATIONS  (PRN):  aluminum hydroxide/magnesium hydroxide/simethicone Suspension 30 milliLiter(s) Oral every 6 hours PRN Dyspepsia  haloperidol    Injectable 1 milliGRAM(s) IV Push every 6 hours PRN Agitation  ondansetron Injectable 4 milliGRAM(s) IV Push every 8 hours PRN Nausea and/or Vomiting      Elderly WF in bed, calm, cooperative, alert and oriented x 3 .  No psychomotor abnormalities. No extrapyramidal side effects. Insight and judgment are fair. Speech is coherent with normal rate and volume. No hallucinations nor delusions. The patient denied suicidal and homicidal ideation and plan. Mood is euthymic and affect full range and appropriate. Attention and concentration, short term memory, and long term memory within normal limits.

## 2020-09-25 NOTE — PROGRESS NOTE ADULT - PROBLEM SELECTOR PROBLEM 1
MAYELIN (acute kidney injury)

## 2020-09-25 NOTE — DISCHARGE NOTE PROVIDER - PROVIDER TOKENS
PROVIDER:[TOKEN:[[id number]],SCHEDULEDAPPT:[10/05/2020],SCHEDULEDAPPTTIME:[10:00 AM],ESTABLISHEDPATIENT:[T]]

## 2020-09-25 NOTE — PROGRESS NOTE ADULT - PROBLEM SELECTOR PLAN 3
As above
As above
improved  monitor

## 2020-09-25 NOTE — PROGRESS NOTE ADULT - REASON FOR ADMISSION
The patient is a 75y Female was BIBA because of AMS.

## 2020-09-25 NOTE — DISCHARGE NOTE PROVIDER - NSDCFUADDAPPT_GEN_ALL_CORE_FT
Follow up with Dr Miguel Angel Montes at [facility] ([address], [address]) at 10AM on 10/5/2020.    Please follow up with psychiatry as outpatient.

## 2021-01-01 ENCOUNTER — TRANSCRIPTION ENCOUNTER (OUTPATIENT)
Age: 76
End: 2021-01-01

## 2021-01-01 ENCOUNTER — INPATIENT (INPATIENT)
Facility: HOSPITAL | Age: 76
LOS: 5 days | DRG: 871 | End: 2021-05-19
Attending: INTERNAL MEDICINE | Admitting: INTERNAL MEDICINE
Payer: MEDICARE

## 2021-01-01 ENCOUNTER — APPOINTMENT (OUTPATIENT)
Dept: FAMILY MEDICINE | Facility: CLINIC | Age: 76
End: 2021-01-01

## 2021-01-01 ENCOUNTER — APPOINTMENT (OUTPATIENT)
Dept: FAMILY MEDICINE | Facility: CLINIC | Age: 76
End: 2021-01-01
Payer: MEDICARE

## 2021-01-01 ENCOUNTER — OUTPATIENT (OUTPATIENT)
Dept: OUTPATIENT SERVICES | Facility: HOSPITAL | Age: 76
LOS: 1 days | End: 2021-01-01
Payer: MEDICARE

## 2021-01-01 ENCOUNTER — APPOINTMENT (OUTPATIENT)
Dept: ULTRASOUND IMAGING | Facility: HOSPITAL | Age: 76
End: 2021-01-01
Payer: MEDICARE

## 2021-01-01 ENCOUNTER — NON-APPOINTMENT (OUTPATIENT)
Age: 76
End: 2021-01-01

## 2021-01-01 VITALS
DIASTOLIC BLOOD PRESSURE: 64 MMHG | RESPIRATION RATE: 18 BRPM | WEIGHT: 95 LBS | TEMPERATURE: 97.4 F | BODY MASS INDEX: 16.83 KG/M2 | HEART RATE: 128 BPM | OXYGEN SATURATION: 98 % | SYSTOLIC BLOOD PRESSURE: 98 MMHG | HEIGHT: 63 IN

## 2021-01-01 VITALS
DIASTOLIC BLOOD PRESSURE: 33 MMHG | RESPIRATION RATE: 11 BRPM | HEART RATE: 58 BPM | OXYGEN SATURATION: 79 % | TEMPERATURE: 98 F | SYSTOLIC BLOOD PRESSURE: 57 MMHG

## 2021-01-01 VITALS
HEART RATE: 129 BPM | DIASTOLIC BLOOD PRESSURE: 62 MMHG | RESPIRATION RATE: 18 BRPM | SYSTOLIC BLOOD PRESSURE: 104 MMHG | HEIGHT: 63 IN | WEIGHT: 109 LBS | OXYGEN SATURATION: 98 % | TEMPERATURE: 97.3 F | BODY MASS INDEX: 19.31 KG/M2

## 2021-01-01 VITALS
HEART RATE: 130 BPM | OXYGEN SATURATION: 92 % | DIASTOLIC BLOOD PRESSURE: 77 MMHG | RESPIRATION RATE: 21 BRPM | SYSTOLIC BLOOD PRESSURE: 116 MMHG

## 2021-01-01 DIAGNOSIS — E83.52 HYPERCALCEMIA: ICD-10-CM

## 2021-01-01 DIAGNOSIS — K92.2 GASTROINTESTINAL HEMORRHAGE, UNSPECIFIED: ICD-10-CM

## 2021-01-01 DIAGNOSIS — R53.1 WEAKNESS: ICD-10-CM

## 2021-01-01 DIAGNOSIS — Z85.828 PERSONAL HISTORY OF OTHER MALIGNANT NEOPLASM OF SKIN: Chronic | ICD-10-CM

## 2021-01-01 DIAGNOSIS — Z23 ENCOUNTER FOR IMMUNIZATION: ICD-10-CM

## 2021-01-01 DIAGNOSIS — G47.00 INSOMNIA, UNSPECIFIED: ICD-10-CM

## 2021-01-01 DIAGNOSIS — M72.2 PLANTAR FASCIAL FIBROMATOSIS: Chronic | ICD-10-CM

## 2021-01-01 DIAGNOSIS — E34.9 ENDOCRINE DISORDER, UNSPECIFIED: ICD-10-CM

## 2021-01-01 DIAGNOSIS — F31.9 BIPOLAR DISORDER, UNSPECIFIED: ICD-10-CM

## 2021-01-01 DIAGNOSIS — N17.9 ACUTE KIDNEY FAILURE, UNSPECIFIED: ICD-10-CM

## 2021-01-01 DIAGNOSIS — Z86.2 PERSONAL HISTORY OF DISEASES OF THE BLOOD AND BLOOD-FORMING ORGANS AND CERTAIN DISORDERS INVOLVING THE IMMUNE MECHANISM: ICD-10-CM

## 2021-01-01 DIAGNOSIS — Z00.00 ENCOUNTER FOR GENERAL ADULT MEDICAL EXAMINATION W/OUT ABNORMAL FINDINGS: ICD-10-CM

## 2021-01-01 DIAGNOSIS — R63.4 ABNORMAL WEIGHT LOSS: ICD-10-CM

## 2021-01-01 DIAGNOSIS — I48.91 UNSPECIFIED ATRIAL FIBRILLATION: ICD-10-CM

## 2021-01-01 DIAGNOSIS — E78.5 HYPERLIPIDEMIA, UNSPECIFIED: ICD-10-CM

## 2021-01-01 LAB
-  AMIKACIN: SIGNIFICANT CHANGE UP
-  AZTREONAM: SIGNIFICANT CHANGE UP
-  CEFEPIME: SIGNIFICANT CHANGE UP
-  CEFTAZIDIME: SIGNIFICANT CHANGE UP
-  CIPROFLOXACIN: SIGNIFICANT CHANGE UP
-  COAGULASE NEGATIVE STAPHYLOCOCCUS: SIGNIFICANT CHANGE UP
-  GENTAMICIN: SIGNIFICANT CHANGE UP
-  LEVOFLOXACIN: SIGNIFICANT CHANGE UP
-  MEROPENEM: SIGNIFICANT CHANGE UP
-  PIPERACILLIN/TAZOBACTAM: SIGNIFICANT CHANGE UP
-  TOBRAMYCIN: SIGNIFICANT CHANGE UP
25(OH)D3 SERPL-MCNC: 47.1 NG/ML
ABO RH CONFIRMATION: SIGNIFICANT CHANGE UP
ALBUMIN SERPL ELPH-MCNC: 1.3 G/DL — LOW (ref 3.3–5)
ALBUMIN SERPL ELPH-MCNC: 1.5 G/DL — LOW (ref 3.3–5)
ALBUMIN SERPL ELPH-MCNC: 1.6 G/DL — LOW (ref 3.3–5)
ALBUMIN SERPL ELPH-MCNC: 1.7 G/DL — LOW (ref 3.3–5)
ALBUMIN SERPL ELPH-MCNC: 1.7 G/DL — LOW (ref 3.3–5)
ALBUMIN SERPL ELPH-MCNC: 1.8 G/DL — LOW (ref 3.3–5)
ALBUMIN SERPL ELPH-MCNC: 2.3 G/DL — LOW (ref 3.3–5)
ALBUMIN SERPL ELPH-MCNC: 2.4 G/DL — LOW (ref 3.3–5)
ALBUMIN SERPL ELPH-MCNC: 3.1 G/DL — LOW (ref 3.3–5)
ALBUMIN SERPL ELPH-MCNC: 4.3 G/DL
ALBUMIN SERPL ELPH-MCNC: 4.5 G/DL
ALP BLD-CCNC: 92 U/L
ALP BLD-CCNC: 95 U/L
ALP SERPL-CCNC: 108 U/L — SIGNIFICANT CHANGE UP (ref 40–120)
ALP SERPL-CCNC: 109 U/L — SIGNIFICANT CHANGE UP (ref 40–120)
ALP SERPL-CCNC: 121 U/L — HIGH (ref 40–120)
ALP SERPL-CCNC: 149 U/L — HIGH (ref 40–120)
ALP SERPL-CCNC: 60 U/L — SIGNIFICANT CHANGE UP (ref 40–120)
ALP SERPL-CCNC: 62 U/L — SIGNIFICANT CHANGE UP (ref 40–120)
ALP SERPL-CCNC: 70 U/L — SIGNIFICANT CHANGE UP (ref 40–120)
ALP SERPL-CCNC: 80 U/L — SIGNIFICANT CHANGE UP (ref 40–120)
ALP SERPL-CCNC: 83 U/L — SIGNIFICANT CHANGE UP (ref 40–120)
ALT FLD-CCNC: 126 U/L — HIGH (ref 10–45)
ALT FLD-CCNC: 14 U/L — SIGNIFICANT CHANGE UP (ref 10–45)
ALT FLD-CCNC: 144 U/L — HIGH (ref 10–45)
ALT FLD-CCNC: 155 U/L — HIGH (ref 10–45)
ALT FLD-CCNC: 157 U/L — HIGH (ref 10–45)
ALT FLD-CCNC: 159 U/L — HIGH (ref 10–45)
ALT FLD-CCNC: 212 U/L — HIGH (ref 10–45)
ALT FLD-CCNC: 61 U/L — HIGH (ref 10–45)
ALT FLD-CCNC: 83 U/L — HIGH (ref 10–45)
ALT SERPL-CCNC: 13 U/L
ALT SERPL-CCNC: 6 U/L
AMYLASE/CREAT SERPL: 95 U/L
ANION GAP SERPL CALC-SCNC: 12 MMOL/L — SIGNIFICANT CHANGE UP (ref 5–17)
ANION GAP SERPL CALC-SCNC: 13 MMOL/L — SIGNIFICANT CHANGE UP (ref 5–17)
ANION GAP SERPL CALC-SCNC: 14 MMOL/L
ANION GAP SERPL CALC-SCNC: 14 MMOL/L — SIGNIFICANT CHANGE UP (ref 5–17)
ANION GAP SERPL CALC-SCNC: 15 MMOL/L
ANION GAP SERPL CALC-SCNC: 15 MMOL/L — SIGNIFICANT CHANGE UP (ref 5–17)
ANION GAP SERPL CALC-SCNC: 16 MMOL/L — SIGNIFICANT CHANGE UP (ref 5–17)
ANION GAP SERPL CALC-SCNC: 18 MMOL/L — HIGH (ref 5–17)
ANION GAP SERPL CALC-SCNC: 18 MMOL/L — HIGH (ref 5–17)
ANION GAP SERPL CALC-SCNC: 19 MMOL/L — HIGH (ref 5–17)
ANION GAP SERPL CALC-SCNC: 9 MMOL/L — SIGNIFICANT CHANGE UP (ref 5–17)
APAP SERPL-MCNC: <1 UG/ML — LOW (ref 10–30)
APPEARANCE UR: CLEAR — SIGNIFICANT CHANGE UP
APTT BLD: 31.4 SEC — SIGNIFICANT CHANGE UP (ref 27.5–35.5)
AST SERPL-CCNC: 148 U/L — HIGH (ref 10–40)
AST SERPL-CCNC: 159 U/L — HIGH (ref 10–40)
AST SERPL-CCNC: 177 U/L — HIGH (ref 10–40)
AST SERPL-CCNC: 184 U/L — HIGH (ref 10–40)
AST SERPL-CCNC: 19 U/L
AST SERPL-CCNC: 193 U/L — HIGH (ref 10–40)
AST SERPL-CCNC: 20 U/L
AST SERPL-CCNC: 202 U/L — HIGH (ref 10–40)
AST SERPL-CCNC: 212 U/L — HIGH (ref 10–40)
AST SERPL-CCNC: 31 U/L — SIGNIFICANT CHANGE UP (ref 10–40)
AST SERPL-CCNC: 96 U/L — HIGH (ref 10–40)
BACTERIA # UR AUTO: ABNORMAL /HPF
BASOPHILS # BLD AUTO: 0 K/UL — SIGNIFICANT CHANGE UP (ref 0–0.2)
BASOPHILS # BLD AUTO: 0.01 K/UL — SIGNIFICANT CHANGE UP (ref 0–0.2)
BASOPHILS # BLD AUTO: 0.03 K/UL — SIGNIFICANT CHANGE UP (ref 0–0.2)
BASOPHILS # BLD AUTO: 0.04 K/UL
BASOPHILS # BLD AUTO: 0.05 K/UL
BASOPHILS NFR BLD AUTO: 0 % — SIGNIFICANT CHANGE UP (ref 0–2)
BASOPHILS NFR BLD AUTO: 0.1 % — SIGNIFICANT CHANGE UP (ref 0–2)
BASOPHILS NFR BLD AUTO: 0.2 % — SIGNIFICANT CHANGE UP (ref 0–2)
BASOPHILS NFR BLD AUTO: 0.5 %
BASOPHILS NFR BLD AUTO: 0.5 %
BASOPHILS NFR BLD AUTO: 0.7 % — SIGNIFICANT CHANGE UP (ref 0–2)
BASOPHILS NFR BLD AUTO: 0.9 % — SIGNIFICANT CHANGE UP (ref 0–2)
BILIRUB SERPL-MCNC: 0.2 MG/DL
BILIRUB SERPL-MCNC: 0.3 MG/DL
BILIRUB SERPL-MCNC: 0.5 MG/DL — SIGNIFICANT CHANGE UP (ref 0.2–1.2)
BILIRUB SERPL-MCNC: 0.7 MG/DL — SIGNIFICANT CHANGE UP (ref 0.2–1.2)
BILIRUB SERPL-MCNC: 1 MG/DL — SIGNIFICANT CHANGE UP (ref 0.2–1.2)
BILIRUB SERPL-MCNC: 1.2 MG/DL — SIGNIFICANT CHANGE UP (ref 0.2–1.2)
BILIRUB SERPL-MCNC: 1.3 MG/DL — HIGH (ref 0.2–1.2)
BILIRUB SERPL-MCNC: 1.6 MG/DL — HIGH (ref 0.2–1.2)
BILIRUB SERPL-MCNC: 1.7 MG/DL — HIGH (ref 0.2–1.2)
BILIRUB SERPL-MCNC: 1.8 MG/DL — HIGH (ref 0.2–1.2)
BILIRUB SERPL-MCNC: 1.9 MG/DL — HIGH (ref 0.2–1.2)
BILIRUB UR-MCNC: NEGATIVE — SIGNIFICANT CHANGE UP
BLD GP AB SCN SERPL QL: SIGNIFICANT CHANGE UP
BLOOD GAS COMMENTS ARTERIAL: SIGNIFICANT CHANGE UP
BLOOD GAS COMMENTS ARTERIAL: SIGNIFICANT CHANGE UP
BUN SERPL-MCNC: 23 MG/DL
BUN SERPL-MCNC: 25 MG/DL
BUN SERPL-MCNC: 51 MG/DL — HIGH (ref 7–23)
BUN SERPL-MCNC: 56 MG/DL — HIGH (ref 7–23)
BUN SERPL-MCNC: 58 MG/DL — HIGH (ref 7–23)
BUN SERPL-MCNC: 60 MG/DL — HIGH (ref 7–23)
BUN SERPL-MCNC: 64 MG/DL — HIGH (ref 7–23)
BUN SERPL-MCNC: 64 MG/DL — HIGH (ref 7–23)
BUN SERPL-MCNC: 69 MG/DL — HIGH (ref 7–23)
BUN SERPL-MCNC: 70 MG/DL — HIGH (ref 7–23)
BUN SERPL-MCNC: 74 MG/DL — HIGH (ref 7–23)
CA-I SERPL-SCNC: 1.39 MMOL/L
CALCIUM SERPL-MCNC: 10 MG/DL — SIGNIFICANT CHANGE UP (ref 8.4–10.5)
CALCIUM SERPL-MCNC: 10.5 MG/DL — SIGNIFICANT CHANGE UP (ref 8.4–10.5)
CALCIUM SERPL-MCNC: 11.1 MG/DL
CALCIUM SERPL-MCNC: 11.3 MG/DL
CALCIUM SERPL-MCNC: 11.4 MG/DL
CALCIUM SERPL-MCNC: 11.5 MG/DL
CALCIUM SERPL-MCNC: 7.3 MG/DL — LOW (ref 8.4–10.5)
CALCIUM SERPL-MCNC: 7.5 MG/DL — LOW (ref 8.4–10.5)
CALCIUM SERPL-MCNC: 7.5 MG/DL — LOW (ref 8.4–10.5)
CALCIUM SERPL-MCNC: 7.7 MG/DL — LOW (ref 8.4–10.5)
CALCIUM SERPL-MCNC: 7.9 MG/DL — LOW (ref 8.4–10.5)
CALCIUM SERPL-MCNC: 8.1 MG/DL — LOW (ref 8.4–10.5)
CALCIUM SERPL-MCNC: 9.9 MG/DL — SIGNIFICANT CHANGE UP (ref 8.4–10.5)
CHLORIDE SERPL-SCNC: 101 MMOL/L — SIGNIFICANT CHANGE UP (ref 96–108)
CHLORIDE SERPL-SCNC: 105 MMOL/L — SIGNIFICANT CHANGE UP (ref 96–108)
CHLORIDE SERPL-SCNC: 107 MMOL/L
CHLORIDE SERPL-SCNC: 108 MMOL/L — SIGNIFICANT CHANGE UP (ref 96–108)
CHLORIDE SERPL-SCNC: 109 MMOL/L
CHLORIDE SERPL-SCNC: 110 MMOL/L — HIGH (ref 96–108)
CHLORIDE SERPL-SCNC: 112 MMOL/L — HIGH (ref 96–108)
CHLORIDE SERPL-SCNC: 113 MMOL/L — HIGH (ref 96–108)
CHLORIDE SERPL-SCNC: 97 MMOL/L — SIGNIFICANT CHANGE UP (ref 96–108)
CHLORIDE SERPL-SCNC: 98 MMOL/L — SIGNIFICANT CHANGE UP (ref 96–108)
CHLORIDE SERPL-SCNC: 98 MMOL/L — SIGNIFICANT CHANGE UP (ref 96–108)
CHOLEST SERPL-MCNC: 264 MG/DL
CK SERPL-CCNC: 1079 U/L — HIGH (ref 25–170)
CO2 BLDA-SCNC: 17 MMOL/L — LOW (ref 22–30)
CO2 BLDA-SCNC: 18 MMOL/L — LOW (ref 22–30)
CO2 BLDA-SCNC: 18 MMOL/L — LOW (ref 22–30)
CO2 BLDA-SCNC: 20 MMOL/L — LOW (ref 22–30)
CO2 BLDA-SCNC: 20 MMOL/L — LOW (ref 22–30)
CO2 BLDA-SCNC: 27 MMOL/L — SIGNIFICANT CHANGE UP (ref 22–30)
CO2 BLDA-SCNC: 29 MMOL/L — SIGNIFICANT CHANGE UP (ref 22–30)
CO2 SERPL-SCNC: 13 MMOL/L — LOW (ref 22–31)
CO2 SERPL-SCNC: 16 MMOL/L — LOW (ref 22–31)
CO2 SERPL-SCNC: 20 MMOL/L — LOW (ref 22–31)
CO2 SERPL-SCNC: 21 MMOL/L
CO2 SERPL-SCNC: 21 MMOL/L
CO2 SERPL-SCNC: 21 MMOL/L — LOW (ref 22–31)
CO2 SERPL-SCNC: 21 MMOL/L — LOW (ref 22–31)
CO2 SERPL-SCNC: 25 MMOL/L — SIGNIFICANT CHANGE UP (ref 22–31)
CO2 SERPL-SCNC: 28 MMOL/L — SIGNIFICANT CHANGE UP (ref 22–31)
CO2 SERPL-SCNC: 28 MMOL/L — SIGNIFICANT CHANGE UP (ref 22–31)
CO2 SERPL-SCNC: 33 MMOL/L — HIGH (ref 22–31)
COLLECT DURATION TIME UR: 24 HR — SIGNIFICANT CHANGE UP
COLOR SPEC: YELLOW — SIGNIFICANT CHANGE UP
COVID-19 SPIKE DOMAIN AB INTERP: POSITIVE
COVID-19 SPIKE DOMAIN ANTIBODY RESULT: >250 U/ML — HIGH
CREAT SERPL-MCNC: 1.36 MG/DL
CREAT SERPL-MCNC: 1.7 MG/DL
CREAT SERPL-MCNC: 2.65 MG/DL — HIGH (ref 0.5–1.3)
CREAT SERPL-MCNC: 2.82 MG/DL — HIGH (ref 0.5–1.3)
CREAT SERPL-MCNC: 3.25 MG/DL — HIGH (ref 0.5–1.3)
CREAT SERPL-MCNC: 3.45 MG/DL — HIGH (ref 0.5–1.3)
CREAT SERPL-MCNC: 3.7 MG/DL — HIGH (ref 0.5–1.3)
CREAT SERPL-MCNC: 3.7 MG/DL — HIGH (ref 0.5–1.3)
CREAT SERPL-MCNC: 3.97 MG/DL — HIGH (ref 0.5–1.3)
CREAT SERPL-MCNC: 3.97 MG/DL — HIGH (ref 0.5–1.3)
CREAT SERPL-MCNC: 4.1 MG/DL — HIGH (ref 0.5–1.3)
CULTURE RESULTS: SIGNIFICANT CHANGE UP
DIFF PNL FLD: ABNORMAL
EOSINOPHIL # BLD AUTO: 0 K/UL — SIGNIFICANT CHANGE UP (ref 0–0.5)
EOSINOPHIL # BLD AUTO: 0.01 K/UL — SIGNIFICANT CHANGE UP (ref 0–0.5)
EOSINOPHIL # BLD AUTO: 0.06 K/UL
EOSINOPHIL # BLD AUTO: 0.07 K/UL
EOSINOPHIL # BLD AUTO: 0.65 K/UL — HIGH (ref 0–0.5)
EOSINOPHIL NFR BLD AUTO: 0 % — SIGNIFICANT CHANGE UP (ref 0–6)
EOSINOPHIL NFR BLD AUTO: 0.1 % — SIGNIFICANT CHANGE UP (ref 0–6)
EOSINOPHIL NFR BLD AUTO: 0.1 % — SIGNIFICANT CHANGE UP (ref 0–6)
EOSINOPHIL NFR BLD AUTO: 0.3 % — SIGNIFICANT CHANGE UP (ref 0–6)
EOSINOPHIL NFR BLD AUTO: 0.6 %
EOSINOPHIL NFR BLD AUTO: 1 %
EOSINOPHIL NFR BLD AUTO: 7.9 % — HIGH (ref 0–6)
EPI CELLS # UR: SIGNIFICANT CHANGE UP
ESTIMATED AVERAGE GLUCOSE: 117 MG/DL
ETHANOL SERPL-MCNC: <3 MG/DL — SIGNIFICANT CHANGE UP (ref 0–3)
FERRITIN SERPL-MCNC: 117 NG/ML
FOLATE SERPL-MCNC: 10.5 NG/ML
GAS PNL BLDA: SIGNIFICANT CHANGE UP
GIANT PLATELETS BLD QL SMEAR: PRESENT — SIGNIFICANT CHANGE UP
GLUCOSE BLDC GLUCOMTR-MCNC: 110 MG/DL — HIGH (ref 70–99)
GLUCOSE BLDC GLUCOMTR-MCNC: 132 MG/DL — HIGH (ref 70–99)
GLUCOSE BLDC GLUCOMTR-MCNC: 141 MG/DL — HIGH (ref 70–99)
GLUCOSE BLDC GLUCOMTR-MCNC: 142 MG/DL — HIGH (ref 70–99)
GLUCOSE BLDC GLUCOMTR-MCNC: 145 MG/DL — HIGH (ref 70–99)
GLUCOSE BLDC GLUCOMTR-MCNC: 153 MG/DL — HIGH (ref 70–99)
GLUCOSE BLDC GLUCOMTR-MCNC: 192 MG/DL — HIGH (ref 70–99)
GLUCOSE BLDC GLUCOMTR-MCNC: 54 MG/DL — CRITICAL LOW (ref 70–99)
GLUCOSE BLDC GLUCOMTR-MCNC: 54 MG/DL — CRITICAL LOW (ref 70–99)
GLUCOSE BLDC GLUCOMTR-MCNC: 85 MG/DL — SIGNIFICANT CHANGE UP (ref 70–99)
GLUCOSE BLDC GLUCOMTR-MCNC: 85 MG/DL — SIGNIFICANT CHANGE UP (ref 70–99)
GLUCOSE BLDC GLUCOMTR-MCNC: 92 MG/DL — SIGNIFICANT CHANGE UP (ref 70–99)
GLUCOSE BLDC GLUCOMTR-MCNC: 93 MG/DL — SIGNIFICANT CHANGE UP (ref 70–99)
GLUCOSE BLDC GLUCOMTR-MCNC: 96 MG/DL — SIGNIFICANT CHANGE UP (ref 70–99)
GLUCOSE SERPL-MCNC: 110 MG/DL
GLUCOSE SERPL-MCNC: 115 MG/DL — HIGH (ref 70–99)
GLUCOSE SERPL-MCNC: 126 MG/DL — HIGH (ref 70–99)
GLUCOSE SERPL-MCNC: 127 MG/DL
GLUCOSE SERPL-MCNC: 142 MG/DL — HIGH (ref 70–99)
GLUCOSE SERPL-MCNC: 145 MG/DL — HIGH (ref 70–99)
GLUCOSE SERPL-MCNC: 152 MG/DL — HIGH (ref 70–99)
GLUCOSE SERPL-MCNC: 158 MG/DL — HIGH (ref 70–99)
GLUCOSE SERPL-MCNC: 165 MG/DL — HIGH (ref 70–99)
GLUCOSE SERPL-MCNC: 71 MG/DL — SIGNIFICANT CHANGE UP (ref 70–99)
GLUCOSE SERPL-MCNC: 72 MG/DL — SIGNIFICANT CHANGE UP (ref 70–99)
GLUCOSE UR QL: NEGATIVE — SIGNIFICANT CHANGE UP
GRAM STN FLD: SIGNIFICANT CHANGE UP
HBA1C MFR BLD HPLC: 5.7 %
HCT VFR BLD CALC: 34.5 % — SIGNIFICANT CHANGE UP (ref 34.5–45)
HCT VFR BLD CALC: 34.6 % — SIGNIFICANT CHANGE UP (ref 34.5–45)
HCT VFR BLD CALC: 38.1 %
HCT VFR BLD CALC: 39.2 %
HCT VFR BLD CALC: 41 % — SIGNIFICANT CHANGE UP (ref 34.5–45)
HCT VFR BLD CALC: 46.4 % — HIGH (ref 34.5–45)
HCT VFR BLD CALC: 47.4 % — HIGH (ref 34.5–45)
HCT VFR BLD CALC: 48.5 % — HIGH (ref 34.5–45)
HCT VFR BLD CALC: 49.2 % — HIGH (ref 34.5–45)
HCT VFR BLD CALC: 50.4 % — HIGH (ref 34.5–45)
HCT VFR BLD CALC: 53.4 % — HIGH (ref 34.5–45)
HDLC SERPL-MCNC: 48 MG/DL
HGB BLD-MCNC: 11.9 G/DL — SIGNIFICANT CHANGE UP (ref 11.5–15.5)
HGB BLD-MCNC: 12.1 G/DL
HGB BLD-MCNC: 12.2 G/DL — SIGNIFICANT CHANGE UP (ref 11.5–15.5)
HGB BLD-MCNC: 12.4 G/DL
HGB BLD-MCNC: 14 G/DL — SIGNIFICANT CHANGE UP (ref 11.5–15.5)
HGB BLD-MCNC: 14.6 G/DL — SIGNIFICANT CHANGE UP (ref 11.5–15.5)
HGB BLD-MCNC: 15.3 G/DL — SIGNIFICANT CHANGE UP (ref 11.5–15.5)
HGB BLD-MCNC: 15.7 G/DL — HIGH (ref 11.5–15.5)
HGB BLD-MCNC: 16.2 G/DL — HIGH (ref 11.5–15.5)
HGB BLD-MCNC: 16.8 G/DL — HIGH (ref 11.5–15.5)
HGB BLD-MCNC: 17.1 G/DL — HIGH (ref 11.5–15.5)
HOROWITZ INDEX BLDA+IHG-RTO: SIGNIFICANT CHANGE UP
IMM GRANULOCYTES NFR BLD AUTO: 0.3 %
IMM GRANULOCYTES NFR BLD AUTO: 0.3 % — SIGNIFICANT CHANGE UP (ref 0–1.5)
IMM GRANULOCYTES NFR BLD AUTO: 0.5 % — SIGNIFICANT CHANGE UP (ref 0–1.5)
IMM GRANULOCYTES NFR BLD AUTO: 0.6 % — SIGNIFICANT CHANGE UP (ref 0–1.5)
IMM GRANULOCYTES NFR BLD AUTO: 0.7 % — SIGNIFICANT CHANGE UP (ref 0–1.5)
IMM GRANULOCYTES NFR BLD AUTO: 0.9 %
IMM GRANULOCYTES NFR BLD AUTO: 0.9 % — SIGNIFICANT CHANGE UP (ref 0–1.5)
IMM GRANULOCYTES NFR BLD AUTO: 1.4 % — SIGNIFICANT CHANGE UP (ref 0–1.5)
INR BLD: 1.69 RATIO — HIGH (ref 0.88–1.16)
IRON SATN MFR SERPL: 9 %
IRON SERPL-MCNC: 27 UG/DL
KETONES UR-MCNC: NEGATIVE — SIGNIFICANT CHANGE UP
LACTATE SERPL-SCNC: 10.1 MMOL/L — CRITICAL HIGH (ref 0.7–2)
LACTATE SERPL-SCNC: 4 MMOL/L — CRITICAL HIGH (ref 0.7–2)
LACTATE SERPL-SCNC: 4.9 MMOL/L — CRITICAL HIGH (ref 0.7–2)
LACTATE SERPL-SCNC: 5.3 MMOL/L — CRITICAL HIGH (ref 0.7–2)
LACTATE SERPL-SCNC: 5.3 MMOL/L — CRITICAL HIGH (ref 0.7–2)
LACTATE SERPL-SCNC: 5.4 MMOL/L — CRITICAL HIGH (ref 0.7–2)
LACTATE SERPL-SCNC: 6.7 MMOL/L — CRITICAL HIGH (ref 0.7–2)
LACTATE SERPL-SCNC: 7.8 MMOL/L — CRITICAL HIGH (ref 0.7–2)
LDLC SERPL CALC-MCNC: 192 MG/DL
LEUKOCYTE ESTERASE UR-ACNC: ABNORMAL
LIDOCAIN IGE QN: 67 U/L — LOW (ref 73–393)
LIDOCAIN IGE QN: 70 U/L — LOW (ref 73–393)
LIDOCAIN IGE QN: 70 U/L — LOW (ref 73–393)
LPL SERPL-CCNC: 34 U/L
LYMPHOCYTES # BLD AUTO: 0.3 K/UL — LOW (ref 1–3.3)
LYMPHOCYTES # BLD AUTO: 0.39 K/UL — LOW (ref 1–3.3)
LYMPHOCYTES # BLD AUTO: 0.4 K/UL — LOW (ref 1–3.3)
LYMPHOCYTES # BLD AUTO: 0.42 K/UL — LOW (ref 1–3.3)
LYMPHOCYTES # BLD AUTO: 0.45 K/UL — LOW (ref 1–3.3)
LYMPHOCYTES # BLD AUTO: 0.73 K/UL — LOW (ref 1–3.3)
LYMPHOCYTES # BLD AUTO: 0.93 K/UL — LOW (ref 1–3.3)
LYMPHOCYTES # BLD AUTO: 1 K/UL — SIGNIFICANT CHANGE UP (ref 1–3.3)
LYMPHOCYTES # BLD AUTO: 1.3 K/UL
LYMPHOCYTES # BLD AUTO: 1.32 K/UL
LYMPHOCYTES # BLD AUTO: 19 % — SIGNIFICANT CHANGE UP (ref 13–44)
LYMPHOCYTES # BLD AUTO: 27.8 % — SIGNIFICANT CHANGE UP (ref 13–44)
LYMPHOCYTES # BLD AUTO: 29.1 % — SIGNIFICANT CHANGE UP (ref 13–44)
LYMPHOCYTES # BLD AUTO: 3.6 % — LOW (ref 13–44)
LYMPHOCYTES # BLD AUTO: 37 % — SIGNIFICANT CHANGE UP (ref 13–44)
LYMPHOCYTES # BLD AUTO: 4.5 % — LOW (ref 13–44)
LYMPHOCYTES # BLD AUTO: 4.8 % — LOW (ref 13–44)
LYMPHOCYTES # BLD AUTO: 9.7 % — LOW (ref 13–44)
LYMPHOCYTES NFR BLD AUTO: 13.4 %
LYMPHOCYTES NFR BLD AUTO: 18.1 %
MAGNESIUM SERPL-MCNC: 2 MG/DL — SIGNIFICANT CHANGE UP (ref 1.6–2.6)
MAGNESIUM SERPL-MCNC: 2 MG/DL — SIGNIFICANT CHANGE UP (ref 1.6–2.6)
MAGNESIUM SERPL-MCNC: 2.2 MG/DL — SIGNIFICANT CHANGE UP (ref 1.6–2.6)
MAGNESIUM SERPL-MCNC: 2.2 MG/DL — SIGNIFICANT CHANGE UP (ref 1.6–2.6)
MAGNESIUM SERPL-MCNC: 2.3 MG/DL — SIGNIFICANT CHANGE UP (ref 1.6–2.6)
MAN DIFF?: NORMAL
MAN DIFF?: NORMAL
MANUAL SMEAR VERIFICATION: SIGNIFICANT CHANGE UP
MANUAL SMEAR VERIFICATION: SIGNIFICANT CHANGE UP
MCHC RBC-ENTMCNC: 28.3 PG
MCHC RBC-ENTMCNC: 28.7 PG
MCHC RBC-ENTMCNC: 29.4 PG — SIGNIFICANT CHANGE UP (ref 27–34)
MCHC RBC-ENTMCNC: 29.5 PG — SIGNIFICANT CHANGE UP (ref 27–34)
MCHC RBC-ENTMCNC: 29.6 PG — SIGNIFICANT CHANGE UP (ref 27–34)
MCHC RBC-ENTMCNC: 29.8 PG — SIGNIFICANT CHANGE UP (ref 27–34)
MCHC RBC-ENTMCNC: 29.9 PG — SIGNIFICANT CHANGE UP (ref 27–34)
MCHC RBC-ENTMCNC: 30.1 GM/DL — LOW (ref 32–36)
MCHC RBC-ENTMCNC: 30.1 PG — SIGNIFICANT CHANGE UP (ref 27–34)
MCHC RBC-ENTMCNC: 30.2 PG — SIGNIFICANT CHANGE UP (ref 27–34)
MCHC RBC-ENTMCNC: 31.5 GM/DL — LOW (ref 32–36)
MCHC RBC-ENTMCNC: 31.6 GM/DL
MCHC RBC-ENTMCNC: 31.8 GM/DL
MCHC RBC-ENTMCNC: 32.9 GM/DL — SIGNIFICANT CHANGE UP (ref 32–36)
MCHC RBC-ENTMCNC: 33 GM/DL — SIGNIFICANT CHANGE UP (ref 32–36)
MCHC RBC-ENTMCNC: 33.1 GM/DL — SIGNIFICANT CHANGE UP (ref 32–36)
MCHC RBC-ENTMCNC: 33.9 GM/DL — SIGNIFICANT CHANGE UP (ref 32–36)
MCHC RBC-ENTMCNC: 34.1 GM/DL — SIGNIFICANT CHANGE UP (ref 32–36)
MCHC RBC-ENTMCNC: 34.5 GM/DL — SIGNIFICANT CHANGE UP (ref 32–36)
MCHC RBC-ENTMCNC: 35.3 GM/DL — SIGNIFICANT CHANGE UP (ref 32–36)
MCV RBC AUTO: 84.8 FL — SIGNIFICANT CHANGE UP (ref 80–100)
MCV RBC AUTO: 85.6 FL — SIGNIFICANT CHANGE UP (ref 80–100)
MCV RBC AUTO: 87.2 FL — SIGNIFICANT CHANGE UP (ref 80–100)
MCV RBC AUTO: 88.6 FL — SIGNIFICANT CHANGE UP (ref 80–100)
MCV RBC AUTO: 89.1 FL — SIGNIFICANT CHANGE UP (ref 80–100)
MCV RBC AUTO: 89.5 FL
MCV RBC AUTO: 90.3 FL — SIGNIFICANT CHANGE UP (ref 80–100)
MCV RBC AUTO: 90.5 FL
MCV RBC AUTO: 90.8 FL — SIGNIFICANT CHANGE UP (ref 80–100)
MCV RBC AUTO: 95.9 FL — SIGNIFICANT CHANGE UP (ref 80–100)
MCV RBC AUTO: 98.2 FL — SIGNIFICANT CHANGE UP (ref 80–100)
METAMYELOCYTES # FLD: 1 % — HIGH (ref 0–0)
METAMYELOCYTES # FLD: 9 % — HIGH (ref 0–0)
METHOD TYPE: SIGNIFICANT CHANGE UP
MICROALBUMIN 24H UR-MRATE: 184.8 MG/24HR — HIGH (ref 0–29.9)
MICROALBUMIN ?TM UR-MRATE: 128.3 UG/MIN — HIGH (ref 0–19.9)
MICROALBUMIN UR-MCNC: 33.6 MG/DL — SIGNIFICANT CHANGE UP
MONOCYTES # BLD AUTO: 0.13 K/UL — SIGNIFICANT CHANGE UP (ref 0–0.9)
MONOCYTES # BLD AUTO: 0.16 K/UL — SIGNIFICANT CHANGE UP (ref 0–0.9)
MONOCYTES # BLD AUTO: 0.19 K/UL — SIGNIFICANT CHANGE UP (ref 0–0.9)
MONOCYTES # BLD AUTO: 0.27 K/UL — SIGNIFICANT CHANGE UP (ref 0–0.9)
MONOCYTES # BLD AUTO: 0.3 K/UL — SIGNIFICANT CHANGE UP (ref 0–0.9)
MONOCYTES # BLD AUTO: 0.34 K/UL — SIGNIFICANT CHANGE UP (ref 0–0.9)
MONOCYTES # BLD AUTO: 0.34 K/UL — SIGNIFICANT CHANGE UP (ref 0–0.9)
MONOCYTES # BLD AUTO: 0.63 K/UL
MONOCYTES # BLD AUTO: 0.67 K/UL — SIGNIFICANT CHANGE UP (ref 0–0.9)
MONOCYTES # BLD AUTO: 0.7 K/UL
MONOCYTES NFR BLD AUTO: 2.3 % — SIGNIFICANT CHANGE UP (ref 2–14)
MONOCYTES NFR BLD AUTO: 20.9 % — HIGH (ref 2–14)
MONOCYTES NFR BLD AUTO: 3.4 % — SIGNIFICANT CHANGE UP (ref 2–14)
MONOCYTES NFR BLD AUTO: 4 % — SIGNIFICANT CHANGE UP (ref 2–14)
MONOCYTES NFR BLD AUTO: 5.8 % — SIGNIFICANT CHANGE UP (ref 2–14)
MONOCYTES NFR BLD AUTO: 6 % — SIGNIFICANT CHANGE UP (ref 2–14)
MONOCYTES NFR BLD AUTO: 7.2 %
MONOCYTES NFR BLD AUTO: 8.6 %
MONOCYTES NFR BLD AUTO: 8.6 % — SIGNIFICANT CHANGE UP (ref 2–14)
MONOCYTES NFR BLD AUTO: 9 % — SIGNIFICANT CHANGE UP (ref 2–14)
MYELOCYTES NFR BLD: 2 % — HIGH (ref 0–0)
NEUTROPHILS # BLD AUTO: 0.62 K/UL — LOW (ref 1.8–7.4)
NEUTROPHILS # BLD AUTO: 0.94 K/UL — LOW (ref 1.8–7.4)
NEUTROPHILS # BLD AUTO: 1.54 K/UL — LOW (ref 1.8–7.4)
NEUTROPHILS # BLD AUTO: 2.22 K/UL — SIGNIFICANT CHANGE UP (ref 1.8–7.4)
NEUTROPHILS # BLD AUTO: 3.88 K/UL — SIGNIFICANT CHANGE UP (ref 1.8–7.4)
NEUTROPHILS # BLD AUTO: 5.21 K/UL
NEUTROPHILS # BLD AUTO: 6.92 K/UL — SIGNIFICANT CHANGE UP (ref 1.8–7.4)
NEUTROPHILS # BLD AUTO: 7.52 K/UL
NEUTROPHILS # BLD AUTO: 7.72 K/UL — HIGH (ref 1.8–7.4)
NEUTROPHILS # BLD AUTO: 8.01 K/UL — HIGH (ref 1.8–7.4)
NEUTROPHILS NFR BLD AUTO: 19 % — LOW (ref 43–77)
NEUTROPHILS NFR BLD AUTO: 48.2 % — SIGNIFICANT CHANGE UP (ref 43–77)
NEUTROPHILS NFR BLD AUTO: 49 % — SIGNIFICANT CHANGE UP (ref 43–77)
NEUTROPHILS NFR BLD AUTO: 62.2 % — SIGNIFICANT CHANGE UP (ref 43–77)
NEUTROPHILS NFR BLD AUTO: 71.5 %
NEUTROPHILS NFR BLD AUTO: 77.4 %
NEUTROPHILS NFR BLD AUTO: 83.4 % — HIGH (ref 43–77)
NEUTROPHILS NFR BLD AUTO: 83.6 % — HIGH (ref 43–77)
NEUTROPHILS NFR BLD AUTO: 91.6 % — HIGH (ref 43–77)
NEUTROPHILS NFR BLD AUTO: 91.8 % — HIGH (ref 43–77)
NEUTS BAND # BLD: 4 % — SIGNIFICANT CHANGE UP (ref 0–8)
NEUTS BAND # BLD: 9 % — HIGH (ref 0–8)
NITRITE UR-MCNC: NEGATIVE — SIGNIFICANT CHANGE UP
NONHDLC SERPL-MCNC: 216 MG/DL
NRBC # BLD: 0 /100 WBCS — SIGNIFICANT CHANGE UP (ref 0–0)
NRBC # BLD: 0 /100 — SIGNIFICANT CHANGE UP (ref 0–0)
NRBC # BLD: 1 /100 — HIGH (ref 0–0)
NT-PROBNP SERPL-SCNC: HIGH PG/ML (ref 0–300)
ORGANISM # SPEC MICROSCOPIC CNT: SIGNIFICANT CHANGE UP
P AERUGINOSA DNA BLD POS NAA+NON-PROBE: SIGNIFICANT CHANGE UP
PARATHYROID HORMONE INTACT: 69 PG/ML
PARATHYROID HORMONE INTACT: 71 PG/ML
PCO2 BLDA: 33 MMHG — SIGNIFICANT CHANGE UP (ref 32–46)
PCO2 BLDA: 34 MMHG — SIGNIFICANT CHANGE UP (ref 32–46)
PCO2 BLDA: 35 MMHG — SIGNIFICANT CHANGE UP (ref 32–46)
PCO2 BLDA: 38 MMHG — SIGNIFICANT CHANGE UP (ref 32–46)
PCO2 BLDA: 38 MMHG — SIGNIFICANT CHANGE UP (ref 32–46)
PCO2 BLDA: 40 MMHG — SIGNIFICANT CHANGE UP (ref 32–46)
PCO2 BLDA: 41 MMHG — SIGNIFICANT CHANGE UP (ref 32–46)
PCP SPEC-MCNC: SIGNIFICANT CHANGE UP
PH BLDA: 7.26 — LOW (ref 7.35–7.45)
PH BLDA: 7.27 — LOW (ref 7.35–7.45)
PH BLDA: 7.28 — LOW (ref 7.35–7.45)
PH BLDA: 7.31 — LOW (ref 7.35–7.45)
PH BLDA: 7.36 — SIGNIFICANT CHANGE UP (ref 7.35–7.45)
PH BLDA: 7.45 — SIGNIFICANT CHANGE UP (ref 7.35–7.45)
PH BLDA: 7.47 — HIGH (ref 7.35–7.45)
PH UR: 6 — SIGNIFICANT CHANGE UP (ref 5–8)
PHOSPHATE SERPL-MCNC: 3.8 MG/DL — SIGNIFICANT CHANGE UP (ref 2.5–4.5)
PHOSPHATE SERPL-MCNC: 4 MG/DL — SIGNIFICANT CHANGE UP (ref 2.5–4.5)
PHOSPHATE SERPL-MCNC: 4 MG/DL — SIGNIFICANT CHANGE UP (ref 2.5–4.5)
PHOSPHATE SERPL-MCNC: 4.3 MG/DL — SIGNIFICANT CHANGE UP (ref 2.5–4.5)
PHOSPHATE SERPL-MCNC: 4.7 MG/DL — HIGH (ref 2.5–4.5)
PHOSPHATE SERPL-MCNC: 5.1 MG/DL — HIGH (ref 2.5–4.5)
PHOSPHATE SERPL-MCNC: 5.8 MG/DL — HIGH (ref 2.5–4.5)
PHOSPHATE SERPL-MCNC: 6.1 MG/DL — HIGH (ref 2.5–4.5)
PLAT MORPH BLD: ABNORMAL
PLAT MORPH BLD: NORMAL — SIGNIFICANT CHANGE UP
PLATELET # BLD AUTO: 11 K/UL — CRITICAL LOW (ref 150–400)
PLATELET # BLD AUTO: 122 K/UL — LOW (ref 150–400)
PLATELET # BLD AUTO: 130 K/UL — LOW (ref 150–400)
PLATELET # BLD AUTO: 14 K/UL — CRITICAL LOW (ref 150–400)
PLATELET # BLD AUTO: 153 K/UL — SIGNIFICANT CHANGE UP (ref 150–400)
PLATELET # BLD AUTO: 226 K/UL — SIGNIFICANT CHANGE UP (ref 150–400)
PLATELET # BLD AUTO: 234 K/UL
PLATELET # BLD AUTO: 236 K/UL
PLATELET # BLD AUTO: 29 K/UL — LOW (ref 150–400)
PLATELET # BLD AUTO: 59 K/UL — LOW (ref 150–400)
PLATELET # BLD AUTO: 93 K/UL — LOW (ref 150–400)
PLATELET CLUMP BLD QL SMEAR: SLIGHT
PO2 BLDA: 132 MMHG — HIGH (ref 74–108)
PO2 BLDA: 133 MMHG — HIGH (ref 74–108)
PO2 BLDA: 135 MMHG — HIGH (ref 74–108)
PO2 BLDA: 138 MMHG — HIGH (ref 74–108)
PO2 BLDA: 157 MMHG — HIGH (ref 74–108)
PO2 BLDA: 162 MMHG — HIGH (ref 74–108)
PO2 BLDA: 187 MMHG — HIGH (ref 74–108)
POLYCHROMASIA BLD QL SMEAR: SLIGHT — SIGNIFICANT CHANGE UP
POTASSIUM SERPL-MCNC: 2.7 MMOL/L — CRITICAL LOW (ref 3.5–5.3)
POTASSIUM SERPL-MCNC: 3.2 MMOL/L — LOW (ref 3.5–5.3)
POTASSIUM SERPL-MCNC: 3.8 MMOL/L — SIGNIFICANT CHANGE UP (ref 3.5–5.3)
POTASSIUM SERPL-MCNC: 4.3 MMOL/L — SIGNIFICANT CHANGE UP (ref 3.5–5.3)
POTASSIUM SERPL-MCNC: 4.6 MMOL/L — SIGNIFICANT CHANGE UP (ref 3.5–5.3)
POTASSIUM SERPL-MCNC: 4.6 MMOL/L — SIGNIFICANT CHANGE UP (ref 3.5–5.3)
POTASSIUM SERPL-MCNC: 4.7 MMOL/L — SIGNIFICANT CHANGE UP (ref 3.5–5.3)
POTASSIUM SERPL-MCNC: 5.1 MMOL/L — SIGNIFICANT CHANGE UP (ref 3.5–5.3)
POTASSIUM SERPL-MCNC: 5.3 MMOL/L — SIGNIFICANT CHANGE UP (ref 3.5–5.3)
POTASSIUM SERPL-SCNC: 2.7 MMOL/L — CRITICAL LOW (ref 3.5–5.3)
POTASSIUM SERPL-SCNC: 3.2 MMOL/L — LOW (ref 3.5–5.3)
POTASSIUM SERPL-SCNC: 3.8 MMOL/L — SIGNIFICANT CHANGE UP (ref 3.5–5.3)
POTASSIUM SERPL-SCNC: 4.3 MMOL/L — SIGNIFICANT CHANGE UP (ref 3.5–5.3)
POTASSIUM SERPL-SCNC: 4.6 MMOL/L — SIGNIFICANT CHANGE UP (ref 3.5–5.3)
POTASSIUM SERPL-SCNC: 4.6 MMOL/L — SIGNIFICANT CHANGE UP (ref 3.5–5.3)
POTASSIUM SERPL-SCNC: 4.7 MMOL/L — SIGNIFICANT CHANGE UP (ref 3.5–5.3)
POTASSIUM SERPL-SCNC: 4.8 MMOL/L
POTASSIUM SERPL-SCNC: 5 MMOL/L
POTASSIUM SERPL-SCNC: 5.1 MMOL/L — SIGNIFICANT CHANGE UP (ref 3.5–5.3)
POTASSIUM SERPL-SCNC: 5.3 MMOL/L — SIGNIFICANT CHANGE UP (ref 3.5–5.3)
PROMYELOCYTES # FLD: 5 % — HIGH (ref 0–0)
PROT SERPL-MCNC: 4.5 G/DL — LOW (ref 6–8.3)
PROT SERPL-MCNC: 4.7 G/DL — LOW (ref 6–8.3)
PROT SERPL-MCNC: 4.7 G/DL — LOW (ref 6–8.3)
PROT SERPL-MCNC: 4.9 G/DL — LOW (ref 6–8.3)
PROT SERPL-MCNC: 5 G/DL — LOW (ref 6–8.3)
PROT SERPL-MCNC: 5 G/DL — LOW (ref 6–8.3)
PROT SERPL-MCNC: 5.9 G/DL — LOW (ref 6–8.3)
PROT SERPL-MCNC: 6 G/DL — SIGNIFICANT CHANGE UP (ref 6–8.3)
PROT SERPL-MCNC: 7 G/DL — SIGNIFICANT CHANGE UP (ref 6–8.3)
PROT SERPL-MCNC: 7.3 G/DL
PROT SERPL-MCNC: 7.3 G/DL
PROT UR-MCNC: 100
PROTHROM AB SERPL-ACNC: 20 SEC — HIGH (ref 10.6–13.6)
RBC # BLD: 4.03 M/UL — SIGNIFICANT CHANGE UP (ref 3.8–5.2)
RBC # BLD: 4.08 M/UL — SIGNIFICANT CHANGE UP (ref 3.8–5.2)
RBC # BLD: 4.21 M/UL
RBC # BLD: 4.38 M/UL
RBC # BLD: 4.38 M/UL
RBC # BLD: 4.7 M/UL — SIGNIFICANT CHANGE UP (ref 3.8–5.2)
RBC # BLD: 4.94 M/UL — SIGNIFICANT CHANGE UP (ref 3.8–5.2)
RBC # BLD: 5.21 M/UL — HIGH (ref 3.8–5.2)
RBC # BLD: 5.25 M/UL — HIGH (ref 3.8–5.2)
RBC # BLD: 5.42 M/UL — HIGH (ref 3.8–5.2)
RBC # BLD: 5.57 M/UL — HIGH (ref 3.8–5.2)
RBC # BLD: 5.69 M/UL — HIGH (ref 3.8–5.2)
RBC # FLD: 14.1 %
RBC # FLD: 15.8 % — HIGH (ref 10.3–14.5)
RBC # FLD: 15.9 %
RBC # FLD: 15.9 % — HIGH (ref 10.3–14.5)
RBC # FLD: 16 % — HIGH (ref 10.3–14.5)
RBC # FLD: 16 % — HIGH (ref 10.3–14.5)
RBC # FLD: 16.4 % — HIGH (ref 10.3–14.5)
RBC # FLD: 16.8 % — HIGH (ref 10.3–14.5)
RBC # FLD: 17 % — HIGH (ref 10.3–14.5)
RBC # FLD: 17.1 % — HIGH (ref 10.3–14.5)
RBC # FLD: 17.2 % — HIGH (ref 10.3–14.5)
RBC BLD AUTO: NORMAL — SIGNIFICANT CHANGE UP
RBC BLD AUTO: NORMAL — SIGNIFICANT CHANGE UP
RBC CASTS # UR COMP ASSIST: ABNORMAL /HPF (ref 0–4)
RETICS # AUTO: 0.8 %
RETICS AGGREG/RBC NFR: 36.4 K/UL
SALICYLATES SERPL-MCNC: <0.2 MG/DL — LOW (ref 3–30)
SAO2 % BLDA: 98 % — HIGH (ref 92–96)
SAO2 % BLDA: 99 % — HIGH (ref 92–96)
SAO2 % BLDA: 99 % — HIGH (ref 92–96)
SARS-COV-2 IGG+IGM SERPL QL IA: >250 U/ML — HIGH
SARS-COV-2 IGG+IGM SERPL QL IA: POSITIVE
SARS-COV-2 RNA SPEC QL NAA+PROBE: SIGNIFICANT CHANGE UP
SODIUM SERPL-SCNC: 139 MMOL/L — SIGNIFICANT CHANGE UP (ref 135–145)
SODIUM SERPL-SCNC: 140 MMOL/L — SIGNIFICANT CHANGE UP (ref 135–145)
SODIUM SERPL-SCNC: 142 MMOL/L — SIGNIFICANT CHANGE UP (ref 135–145)
SODIUM SERPL-SCNC: 143 MMOL/L
SODIUM SERPL-SCNC: 143 MMOL/L
SODIUM SERPL-SCNC: 144 MMOL/L — SIGNIFICANT CHANGE UP (ref 135–145)
SODIUM SERPL-SCNC: 145 MMOL/L — SIGNIFICANT CHANGE UP (ref 135–145)
SODIUM SERPL-SCNC: 145 MMOL/L — SIGNIFICANT CHANGE UP (ref 135–145)
SODIUM SERPL-SCNC: 146 MMOL/L — HIGH (ref 135–145)
SP GR SPEC: 1.01 — SIGNIFICANT CHANGE UP (ref 1.01–1.02)
SPECIMEN SOURCE: SIGNIFICANT CHANGE UP
T3FREE SERPL-MCNC: 2.44 PG/ML
T4 FREE SERPL-MCNC: 1.2 NG/DL
THYROPEROXIDASE AB SERPL IA-ACNC: 65 IU/ML
TIBC SERPL-MCNC: 292 UG/DL
TOTAL VOLUME - 24 HOUR: 550 ML — SIGNIFICANT CHANGE UP
TRANSFERRIN SERPL-MCNC: 233 MG/DL
TRIGL SERPL-MCNC: 120 MG/DL
TROPONIN I SERPL-MCNC: 0.09 NG/ML — HIGH (ref 0.02–0.06)
TSH SERPL-ACNC: 1.44 UIU/ML
TSH SERPL-ACNC: 1.73 UIU/ML
UIBC SERPL-MCNC: 265 UG/DL
URATE SERPL-MCNC: 5.2 MG/DL
URINE CREATININE CALCULATION: 0.2 G/24 H — LOW (ref 0.8–1.8)
UROBILINOGEN FLD QL: NEGATIVE — SIGNIFICANT CHANGE UP
VANCOMYCIN FLD-MCNC: 21.2 UG/ML — SIGNIFICANT CHANGE UP
VARIANT LYMPHS # BLD: 31 % — HIGH (ref 0–6)
VIT B12 SERPL-MCNC: 446 PG/ML
WBC # BLD: 0.99 K/UL — CRITICAL LOW (ref 3.8–10.5)
WBC # BLD: 1.51 K/UL — LOW (ref 3.8–10.5)
WBC # BLD: 2.71 K/UL — LOW (ref 3.8–10.5)
WBC # BLD: 3.2 K/UL — LOW (ref 3.8–10.5)
WBC # BLD: 3.82 K/UL — SIGNIFICANT CHANGE UP (ref 3.8–10.5)
WBC # BLD: 4.65 K/UL — SIGNIFICANT CHANGE UP (ref 3.8–10.5)
WBC # BLD: 8.28 K/UL — SIGNIFICANT CHANGE UP (ref 3.8–10.5)
WBC # BLD: 8.41 K/UL — SIGNIFICANT CHANGE UP (ref 3.8–10.5)
WBC # BLD: 8.75 K/UL — SIGNIFICANT CHANGE UP (ref 3.8–10.5)
WBC # FLD AUTO: 0.99 K/UL — CRITICAL LOW (ref 3.8–10.5)
WBC # FLD AUTO: 1.51 K/UL — LOW (ref 3.8–10.5)
WBC # FLD AUTO: 2.71 K/UL — LOW (ref 3.8–10.5)
WBC # FLD AUTO: 3.2 K/UL — LOW (ref 3.8–10.5)
WBC # FLD AUTO: 3.82 K/UL — SIGNIFICANT CHANGE UP (ref 3.8–10.5)
WBC # FLD AUTO: 4.65 K/UL — SIGNIFICANT CHANGE UP (ref 3.8–10.5)
WBC # FLD AUTO: 7.29 K/UL
WBC # FLD AUTO: 8.28 K/UL — SIGNIFICANT CHANGE UP (ref 3.8–10.5)
WBC # FLD AUTO: 8.41 K/UL — SIGNIFICANT CHANGE UP (ref 3.8–10.5)
WBC # FLD AUTO: 8.75 K/UL — SIGNIFICANT CHANGE UP (ref 3.8–10.5)
WBC # FLD AUTO: 9.72 K/UL
WBC UR QL: SIGNIFICANT CHANGE UP /HPF (ref 0–5)

## 2021-01-01 PROCEDURE — 94760 N-INVAS EAR/PLS OXIMETRY 1: CPT

## 2021-01-01 PROCEDURE — 84484 ASSAY OF TROPONIN QUANT: CPT

## 2021-01-01 PROCEDURE — 87635 SARS-COV-2 COVID-19 AMP PRB: CPT

## 2021-01-01 PROCEDURE — 94003 VENT MGMT INPAT SUBQ DAY: CPT

## 2021-01-01 PROCEDURE — P9037: CPT

## 2021-01-01 PROCEDURE — 93306 TTE W/DOPPLER COMPLETE: CPT

## 2021-01-01 PROCEDURE — 76536 US EXAM OF HEAD AND NECK: CPT

## 2021-01-01 PROCEDURE — P9016: CPT

## 2021-01-01 PROCEDURE — 83880 ASSAY OF NATRIURETIC PEPTIDE: CPT

## 2021-01-01 PROCEDURE — 36600 WITHDRAWAL OF ARTERIAL BLOOD: CPT

## 2021-01-01 PROCEDURE — 72125 CT NECK SPINE W/O DYE: CPT

## 2021-01-01 PROCEDURE — 94002 VENT MGMT INPAT INIT DAY: CPT

## 2021-01-01 PROCEDURE — 74176 CT ABD & PELVIS W/O CONTRAST: CPT | Mod: 26

## 2021-01-01 PROCEDURE — 74176 CT ABD & PELVIS W/O CONTRAST: CPT

## 2021-01-01 PROCEDURE — 99223 1ST HOSP IP/OBS HIGH 75: CPT

## 2021-01-01 PROCEDURE — 99232 SBSQ HOSP IP/OBS MODERATE 35: CPT

## 2021-01-01 PROCEDURE — 86923 COMPATIBILITY TEST ELECTRIC: CPT

## 2021-01-01 PROCEDURE — 83735 ASSAY OF MAGNESIUM: CPT

## 2021-01-01 PROCEDURE — 93005 ELECTROCARDIOGRAM TRACING: CPT

## 2021-01-01 PROCEDURE — 83605 ASSAY OF LACTIC ACID: CPT

## 2021-01-01 PROCEDURE — C1751: CPT

## 2021-01-01 PROCEDURE — 36415 COLL VENOUS BLD VENIPUNCTURE: CPT

## 2021-01-01 PROCEDURE — 82043 UR ALBUMIN QUANTITATIVE: CPT

## 2021-01-01 PROCEDURE — 86769 SARS-COV-2 COVID-19 ANTIBODY: CPT

## 2021-01-01 PROCEDURE — 93306 TTE W/DOPPLER COMPLETE: CPT | Mod: 26

## 2021-01-01 PROCEDURE — P9017: CPT

## 2021-01-01 PROCEDURE — 76536 US EXAM OF HEAD AND NECK: CPT | Mod: 26

## 2021-01-01 PROCEDURE — 82962 GLUCOSE BLOOD TEST: CPT

## 2021-01-01 PROCEDURE — 86901 BLOOD TYPING SEROLOGIC RH(D): CPT

## 2021-01-01 PROCEDURE — 80307 DRUG TEST PRSMV CHEM ANLYZR: CPT

## 2021-01-01 PROCEDURE — 81001 URINALYSIS AUTO W/SCOPE: CPT

## 2021-01-01 PROCEDURE — 71045 X-RAY EXAM CHEST 1 VIEW: CPT | Mod: 26

## 2021-01-01 PROCEDURE — 70450 CT HEAD/BRAIN W/O DYE: CPT | Mod: 26

## 2021-01-01 PROCEDURE — 99497 ADVNCD CARE PLAN 30 MIN: CPT | Mod: 25

## 2021-01-01 PROCEDURE — 87040 BLOOD CULTURE FOR BACTERIA: CPT

## 2021-01-01 PROCEDURE — 36430 TRANSFUSION BLD/BLD COMPNT: CPT

## 2021-01-01 PROCEDURE — 86850 RBC ANTIBODY SCREEN: CPT

## 2021-01-01 PROCEDURE — 80053 COMPREHEN METABOLIC PANEL: CPT

## 2021-01-01 PROCEDURE — 96376 TX/PRO/DX INJ SAME DRUG ADON: CPT | Mod: XU

## 2021-01-01 PROCEDURE — 85025 COMPLETE CBC W/AUTO DIFF WBC: CPT

## 2021-01-01 PROCEDURE — 85730 THROMBOPLASTIN TIME PARTIAL: CPT

## 2021-01-01 PROCEDURE — 84100 ASSAY OF PHOSPHORUS: CPT

## 2021-01-01 PROCEDURE — P9059: CPT

## 2021-01-01 PROCEDURE — 93000 ELECTROCARDIOGRAM COMPLETE: CPT | Mod: 59

## 2021-01-01 PROCEDURE — 87077 CULTURE AEROBIC IDENTIFY: CPT

## 2021-01-01 PROCEDURE — 87186 SC STD MICRODIL/AGAR DIL: CPT

## 2021-01-01 PROCEDURE — 87150 DNA/RNA AMPLIFIED PROBE: CPT

## 2021-01-01 PROCEDURE — 86900 BLOOD TYPING SEROLOGIC ABO: CPT

## 2021-01-01 PROCEDURE — 99205 OFFICE O/P NEW HI 60 MIN: CPT | Mod: 25

## 2021-01-01 PROCEDURE — 93010 ELECTROCARDIOGRAM REPORT: CPT

## 2021-01-01 PROCEDURE — 83690 ASSAY OF LIPASE: CPT

## 2021-01-01 PROCEDURE — G0439: CPT

## 2021-01-01 PROCEDURE — 36556 INSERT NON-TUNNEL CV CATH: CPT | Mod: 25

## 2021-01-01 PROCEDURE — 36556 INSERT NON-TUNNEL CV CATH: CPT

## 2021-01-01 PROCEDURE — 82803 BLOOD GASES ANY COMBINATION: CPT

## 2021-01-01 PROCEDURE — 82550 ASSAY OF CK (CPK): CPT

## 2021-01-01 PROCEDURE — 80202 ASSAY OF VANCOMYCIN: CPT

## 2021-01-01 PROCEDURE — 99291 CRITICAL CARE FIRST HOUR: CPT | Mod: CS

## 2021-01-01 PROCEDURE — 85610 PROTHROMBIN TIME: CPT

## 2021-01-01 PROCEDURE — 96374 THER/PROPH/DIAG INJ IV PUSH: CPT | Mod: XU

## 2021-01-01 PROCEDURE — 70450 CT HEAD/BRAIN W/O DYE: CPT

## 2021-01-01 PROCEDURE — 85027 COMPLETE CBC AUTOMATED: CPT

## 2021-01-01 PROCEDURE — 71045 X-RAY EXAM CHEST 1 VIEW: CPT

## 2021-01-01 PROCEDURE — P9047: CPT

## 2021-01-01 PROCEDURE — P9100: CPT

## 2021-01-01 PROCEDURE — 87086 URINE CULTURE/COLONY COUNT: CPT

## 2021-01-01 PROCEDURE — 72125 CT NECK SPINE W/O DYE: CPT | Mod: 26

## 2021-01-01 PROCEDURE — 99291 CRITICAL CARE FIRST HOUR: CPT | Mod: 25

## 2021-01-01 RX ORDER — MIDAZOLAM HYDROCHLORIDE 1 MG/ML
2 INJECTION, SOLUTION INTRAMUSCULAR; INTRAVENOUS ONCE
Refills: 0 | Status: DISCONTINUED | OUTPATIENT
Start: 2021-01-01 | End: 2021-01-01

## 2021-01-01 RX ORDER — FENTANYL CITRATE 50 UG/ML
1 INJECTION INTRAVENOUS
Qty: 5000 | Refills: 0 | Status: DISCONTINUED | OUTPATIENT
Start: 2021-01-01 | End: 2021-01-01

## 2021-01-01 RX ORDER — SODIUM CHLORIDE 9 MG/ML
1000 INJECTION INTRAMUSCULAR; INTRAVENOUS; SUBCUTANEOUS ONCE
Refills: 0 | Status: COMPLETED | OUTPATIENT
Start: 2021-01-01 | End: 2021-01-01

## 2021-01-01 RX ORDER — CHLORHEXIDINE GLUCONATE 213 G/1000ML
15 SOLUTION TOPICAL EVERY 12 HOURS
Refills: 0 | Status: DISCONTINUED | OUTPATIENT
Start: 2021-01-01 | End: 2021-01-01

## 2021-01-01 RX ORDER — ALBUMIN HUMAN 25 %
100 VIAL (ML) INTRAVENOUS ONCE
Refills: 0 | Status: COMPLETED | OUTPATIENT
Start: 2021-01-01 | End: 2021-01-01

## 2021-01-01 RX ORDER — NOREPINEPHRINE BITARTRATE/D5W 8 MG/250ML
0.05 PLASTIC BAG, INJECTION (ML) INTRAVENOUS
Qty: 16 | Refills: 0 | Status: DISCONTINUED | OUTPATIENT
Start: 2021-01-01 | End: 2021-01-01

## 2021-01-01 RX ORDER — PIPERACILLIN AND TAZOBACTAM 4; .5 G/20ML; G/20ML
3.38 INJECTION, POWDER, LYOPHILIZED, FOR SOLUTION INTRAVENOUS EVERY 12 HOURS
Refills: 0 | Status: DISCONTINUED | OUTPATIENT
Start: 2021-01-01 | End: 2021-01-01

## 2021-01-01 RX ORDER — PANTOPRAZOLE SODIUM 20 MG/1
40 TABLET, DELAYED RELEASE ORAL ONCE
Refills: 0 | Status: COMPLETED | OUTPATIENT
Start: 2021-01-01 | End: 2021-01-01

## 2021-01-01 RX ORDER — ALBUMIN HUMAN 25 %
50 VIAL (ML) INTRAVENOUS EVERY 6 HOURS
Refills: 0 | Status: DISCONTINUED | OUTPATIENT
Start: 2021-01-01 | End: 2021-01-01

## 2021-01-01 RX ORDER — ROBINUL 0.2 MG/ML
0.1 INJECTION INTRAMUSCULAR; INTRAVENOUS EVERY 8 HOURS
Refills: 0 | Status: DISCONTINUED | OUTPATIENT
Start: 2021-01-01 | End: 2021-01-01

## 2021-01-01 RX ORDER — LITHIUM CARBONATE 300 MG/1
300 TABLET, FILM COATED, EXTENDED RELEASE ORAL
Qty: 180 | Refills: 0 | Status: COMPLETED | COMMUNITY
Start: 2020-01-01

## 2021-01-01 RX ORDER — PROPOFOL 10 MG/ML
5 INJECTION, EMULSION INTRAVENOUS
Qty: 1000 | Refills: 0 | Status: DISCONTINUED | OUTPATIENT
Start: 2021-01-01 | End: 2021-01-01

## 2021-01-01 RX ORDER — ARIPIPRAZOLE 15 MG/1
5 TABLET ORAL DAILY
Refills: 0 | Status: DISCONTINUED | OUTPATIENT
Start: 2021-01-01 | End: 2021-01-01

## 2021-01-01 RX ORDER — PIPERACILLIN AND TAZOBACTAM 4; .5 G/20ML; G/20ML
3.38 INJECTION, POWDER, LYOPHILIZED, FOR SOLUTION INTRAVENOUS ONCE
Refills: 0 | Status: COMPLETED | OUTPATIENT
Start: 2021-01-01 | End: 2021-01-01

## 2021-01-01 RX ORDER — SODIUM CHLORIDE 9 MG/ML
500 INJECTION, SOLUTION INTRAVENOUS ONCE
Refills: 0 | Status: COMPLETED | OUTPATIENT
Start: 2021-01-01 | End: 2021-01-01

## 2021-01-01 RX ORDER — PHENYLEPHRINE HYDROCHLORIDE 10 MG/ML
0.1 INJECTION INTRAVENOUS
Qty: 40 | Refills: 0 | Status: DISCONTINUED | OUTPATIENT
Start: 2021-01-01 | End: 2021-01-01

## 2021-01-01 RX ORDER — MIRTAZAPINE 45 MG/1
15 TABLET, ORALLY DISINTEGRATING ORAL AT BEDTIME
Refills: 0 | Status: DISCONTINUED | OUTPATIENT
Start: 2021-01-01 | End: 2021-01-01

## 2021-01-01 RX ORDER — FENTANYL CITRATE 50 UG/ML
1 INJECTION INTRAVENOUS
Qty: 2500 | Refills: 0 | Status: DISCONTINUED | OUTPATIENT
Start: 2021-01-01 | End: 2021-01-01

## 2021-01-01 RX ORDER — APIXABAN 5 MG/1
5 TABLET, FILM COATED ORAL
Qty: 60 | Refills: 0 | Status: COMPLETED | COMMUNITY
Start: 2021-01-01

## 2021-01-01 RX ORDER — VANCOMYCIN HCL 1 G
1000 VIAL (EA) INTRAVENOUS ONCE
Refills: 0 | Status: COMPLETED | OUTPATIENT
Start: 2021-01-01 | End: 2021-01-01

## 2021-01-01 RX ORDER — CHLORHEXIDINE GLUCONATE 213 G/1000ML
1 SOLUTION TOPICAL
Refills: 0 | Status: DISCONTINUED | OUTPATIENT
Start: 2021-01-01 | End: 2021-01-01

## 2021-01-01 RX ORDER — SODIUM CHLORIDE 9 MG/ML
1000 INJECTION, SOLUTION INTRAVENOUS
Refills: 0 | Status: DISCONTINUED | OUTPATIENT
Start: 2021-01-01 | End: 2021-01-01

## 2021-01-01 RX ORDER — ACETAMINOPHEN 500 MG
1000 TABLET ORAL ONCE
Refills: 0 | Status: COMPLETED | OUTPATIENT
Start: 2021-01-01 | End: 2021-01-01

## 2021-01-01 RX ORDER — SODIUM CHLORIDE 9 MG/ML
1000 INJECTION, SOLUTION INTRAVENOUS ONCE
Refills: 0 | Status: COMPLETED | OUTPATIENT
Start: 2021-01-01 | End: 2021-01-01

## 2021-01-01 RX ORDER — SIMVASTATIN 20 MG/1
20 TABLET, FILM COATED ORAL
Qty: 90 | Refills: 0 | Status: ACTIVE | COMMUNITY
Start: 2021-01-01 | End: 1900-01-01

## 2021-01-01 RX ORDER — MIRTAZAPINE 15 MG/1
15 TABLET, FILM COATED ORAL
Qty: 45 | Refills: 0 | Status: COMPLETED | COMMUNITY
Start: 2021-01-01

## 2021-01-01 RX ORDER — SODIUM BICARBONATE 1 MEQ/ML
0.35 SYRINGE (ML) INTRAVENOUS
Qty: 150 | Refills: 0 | Status: DISCONTINUED | OUTPATIENT
Start: 2021-01-01 | End: 2021-01-01

## 2021-01-01 RX ORDER — FLUCONAZOLE 150 MG/1
TABLET ORAL
Refills: 0 | Status: DISCONTINUED | OUTPATIENT
Start: 2021-01-01 | End: 2021-01-01

## 2021-01-01 RX ORDER — BUPROPION HYDROCHLORIDE 100 MG/1
100 TABLET, FILM COATED ORAL
Qty: 180 | Refills: 0 | Status: COMPLETED | COMMUNITY
Start: 2020-01-01

## 2021-01-01 RX ORDER — SODIUM CHLORIDE 9 MG/ML
2000 INJECTION INTRAMUSCULAR; INTRAVENOUS; SUBCUTANEOUS ONCE
Refills: 0 | Status: COMPLETED | OUTPATIENT
Start: 2021-01-01 | End: 2021-01-01

## 2021-01-01 RX ORDER — PANTOPRAZOLE SODIUM 20 MG/1
8 TABLET, DELAYED RELEASE ORAL
Qty: 80 | Refills: 0 | Status: DISCONTINUED | OUTPATIENT
Start: 2021-01-01 | End: 2021-01-01

## 2021-01-01 RX ORDER — GENTAMICIN SULFATE 40 MG/ML
200 VIAL (ML) INJECTION ONCE
Refills: 0 | Status: COMPLETED | OUTPATIENT
Start: 2021-01-01 | End: 2021-01-01

## 2021-01-01 RX ORDER — FLUCONAZOLE 150 MG/1
200 TABLET ORAL ONCE
Refills: 0 | Status: COMPLETED | OUTPATIENT
Start: 2021-01-01 | End: 2021-01-01

## 2021-01-01 RX ORDER — ARIPIPRAZOLE 10 MG/1
10 TABLET ORAL DAILY
Refills: 0 | Status: ACTIVE | COMMUNITY
Start: 2021-01-01

## 2021-01-01 RX ORDER — MELATONIN 3 MG
3 CAPSULE ORAL
Qty: 90 | Refills: 3 | Status: ACTIVE | COMMUNITY
Start: 2021-01-01

## 2021-01-01 RX ORDER — VASOPRESSIN 20 [USP'U]/ML
0.04 INJECTION INTRAVENOUS
Qty: 50 | Refills: 0 | Status: DISCONTINUED | OUTPATIENT
Start: 2021-01-01 | End: 2021-01-01

## 2021-01-01 RX ORDER — FLUCONAZOLE 150 MG/1
200 TABLET ORAL EVERY 24 HOURS
Refills: 0 | Status: DISCONTINUED | OUTPATIENT
Start: 2021-01-01 | End: 2021-01-01

## 2021-01-01 RX ORDER — PANTOPRAZOLE SODIUM 20 MG/1
40 TABLET, DELAYED RELEASE ORAL EVERY 12 HOURS
Refills: 0 | Status: DISCONTINUED | OUTPATIENT
Start: 2021-01-01 | End: 2021-01-01

## 2021-01-01 RX ORDER — ARIPIPRAZOLE 10 MG/1
10 TABLET ORAL
Qty: 30 | Refills: 0 | Status: COMPLETED | COMMUNITY
Start: 2021-01-01

## 2021-01-01 RX ORDER — KRILL/OM-3/DHA/EPA/PHOSPHO/AST 1000-230MG
81 CAPSULE ORAL
Refills: 0 | Status: ACTIVE | COMMUNITY
Start: 2021-01-01

## 2021-01-01 RX ORDER — MIRTAZAPINE 30 MG/1
30 TABLET, FILM COATED ORAL
Qty: 30 | Refills: 0 | Status: ACTIVE | COMMUNITY
Start: 2021-01-01

## 2021-01-01 RX ORDER — SODIUM CHLORIDE 9 MG/ML
1000 INJECTION INTRAMUSCULAR; INTRAVENOUS; SUBCUTANEOUS
Refills: 0 | Status: DISCONTINUED | OUTPATIENT
Start: 2021-01-01 | End: 2021-01-01

## 2021-01-01 RX ORDER — SODIUM CHLORIDE 9 MG/ML
10 INJECTION INTRAMUSCULAR; INTRAVENOUS; SUBCUTANEOUS
Refills: 0 | Status: DISCONTINUED | OUTPATIENT
Start: 2021-01-01 | End: 2021-01-01

## 2021-01-01 RX ORDER — ACETAMINOPHEN 500 MG
650 TABLET ORAL EVERY 6 HOURS
Refills: 0 | Status: DISCONTINUED | OUTPATIENT
Start: 2021-01-01 | End: 2021-01-01

## 2021-01-01 RX ORDER — DIGOXIN 250 MCG
250 TABLET ORAL ONCE
Refills: 0 | Status: COMPLETED | OUTPATIENT
Start: 2021-01-01 | End: 2021-01-01

## 2021-01-01 RX ORDER — APIXABAN 5 MG/1
5 TABLET, FILM COATED ORAL
Qty: 180 | Refills: 0 | Status: ACTIVE | COMMUNITY
Start: 2021-01-01 | End: 1900-01-01

## 2021-01-01 RX ADMIN — Medication 250 MILLIGRAM(S): at 13:13

## 2021-01-01 RX ADMIN — Medication 125 MEQ/KG/HR: at 04:03

## 2021-01-01 RX ADMIN — PIPERACILLIN AND TAZOBACTAM 25 GRAM(S): 4; .5 INJECTION, POWDER, LYOPHILIZED, FOR SOLUTION INTRAVENOUS at 23:56

## 2021-01-01 RX ADMIN — Medication 250 MICROGRAM(S): at 09:37

## 2021-01-01 RX ADMIN — Medication 50 MILLILITER(S): at 05:44

## 2021-01-01 RX ADMIN — SODIUM CHLORIDE 100 MILLILITER(S): 9 INJECTION INTRAMUSCULAR; INTRAVENOUS; SUBCUTANEOUS at 05:00

## 2021-01-01 RX ADMIN — CHLORHEXIDINE GLUCONATE 1 APPLICATION(S): 213 SOLUTION TOPICAL at 06:24

## 2021-01-01 RX ADMIN — Medication 125 MEQ/KG/HR: at 23:59

## 2021-01-01 RX ADMIN — CHLORHEXIDINE GLUCONATE 15 MILLILITER(S): 213 SOLUTION TOPICAL at 04:29

## 2021-01-01 RX ADMIN — Medication 2.53 MICROGRAM(S)/KG/MIN: at 07:00

## 2021-01-01 RX ADMIN — CHLORHEXIDINE GLUCONATE 15 MILLILITER(S): 213 SOLUTION TOPICAL at 18:02

## 2021-01-01 RX ADMIN — SODIUM CHLORIDE 2000 MILLILITER(S): 9 INJECTION INTRAMUSCULAR; INTRAVENOUS; SUBCUTANEOUS at 11:00

## 2021-01-01 RX ADMIN — PANTOPRAZOLE SODIUM 40 MILLIGRAM(S): 20 TABLET, DELAYED RELEASE ORAL at 18:23

## 2021-01-01 RX ADMIN — FENTANYL CITRATE 5.4 MICROGRAM(S)/KG/HR: 50 INJECTION INTRAVENOUS at 14:50

## 2021-01-01 RX ADMIN — VASOPRESSIN 2.4 UNIT(S)/MIN: 20 INJECTION INTRAVENOUS at 21:31

## 2021-01-01 RX ADMIN — PHENYLEPHRINE HYDROCHLORIDE 2.03 MICROGRAM(S)/KG/MIN: 10 INJECTION INTRAVENOUS at 16:22

## 2021-01-01 RX ADMIN — Medication 50 MILLILITER(S): at 01:29

## 2021-01-01 RX ADMIN — PANTOPRAZOLE SODIUM 40 MILLIGRAM(S): 20 TABLET, DELAYED RELEASE ORAL at 05:43

## 2021-01-01 RX ADMIN — PANTOPRAZOLE SODIUM 10 MG/HR: 20 TABLET, DELAYED RELEASE ORAL at 09:32

## 2021-01-01 RX ADMIN — SODIUM CHLORIDE 2000 MILLILITER(S): 9 INJECTION INTRAMUSCULAR; INTRAVENOUS; SUBCUTANEOUS at 18:00

## 2021-01-01 RX ADMIN — PIPERACILLIN AND TAZOBACTAM 25 GRAM(S): 4; .5 INJECTION, POWDER, LYOPHILIZED, FOR SOLUTION INTRAVENOUS at 11:17

## 2021-01-01 RX ADMIN — SODIUM CHLORIDE 100 MILLILITER(S): 9 INJECTION, SOLUTION INTRAVENOUS at 21:51

## 2021-01-01 RX ADMIN — PIPERACILLIN AND TAZOBACTAM 200 GRAM(S): 4; .5 INJECTION, POWDER, LYOPHILIZED, FOR SOLUTION INTRAVENOUS at 23:22

## 2021-01-01 RX ADMIN — PHENYLEPHRINE HYDROCHLORIDE 2.03 MICROGRAM(S)/KG/MIN: 10 INJECTION INTRAVENOUS at 07:00

## 2021-01-01 RX ADMIN — CHLORHEXIDINE GLUCONATE 1 APPLICATION(S): 213 SOLUTION TOPICAL at 06:07

## 2021-01-01 RX ADMIN — CHLORHEXIDINE GLUCONATE 15 MILLILITER(S): 213 SOLUTION TOPICAL at 17:50

## 2021-01-01 RX ADMIN — Medication 1000 MILLIGRAM(S): at 04:25

## 2021-01-01 RX ADMIN — PHENYLEPHRINE HYDROCHLORIDE 2.03 MICROGRAM(S)/KG/MIN: 10 INJECTION INTRAVENOUS at 21:31

## 2021-01-01 RX ADMIN — PHENYLEPHRINE HYDROCHLORIDE 2.03 MICROGRAM(S)/KG/MIN: 10 INJECTION INTRAVENOUS at 04:30

## 2021-01-01 RX ADMIN — Medication 2.53 MICROGRAM(S)/KG/MIN: at 04:03

## 2021-01-01 RX ADMIN — Medication 125 MEQ/KG/HR: at 19:23

## 2021-01-01 RX ADMIN — MIDAZOLAM HYDROCHLORIDE 2 MILLIGRAM(S): 1 INJECTION, SOLUTION INTRAMUSCULAR; INTRAVENOUS at 04:28

## 2021-01-01 RX ADMIN — SODIUM CHLORIDE 1000 MILLILITER(S): 9 INJECTION, SOLUTION INTRAVENOUS at 03:29

## 2021-01-01 RX ADMIN — Medication 2 MILLIGRAM(S): at 11:55

## 2021-01-01 RX ADMIN — SODIUM CHLORIDE 1000 MILLILITER(S): 9 INJECTION INTRAMUSCULAR; INTRAVENOUS; SUBCUTANEOUS at 08:00

## 2021-01-01 RX ADMIN — CHLORHEXIDINE GLUCONATE 1 APPLICATION(S): 213 SOLUTION TOPICAL at 04:25

## 2021-01-01 RX ADMIN — PIPERACILLIN AND TAZOBACTAM 25 GRAM(S): 4; .5 INJECTION, POWDER, LYOPHILIZED, FOR SOLUTION INTRAVENOUS at 11:12

## 2021-01-01 RX ADMIN — PANTOPRAZOLE SODIUM 10 MG/HR: 20 TABLET, DELAYED RELEASE ORAL at 03:56

## 2021-01-01 RX ADMIN — SODIUM CHLORIDE 2000 MILLILITER(S): 9 INJECTION, SOLUTION INTRAVENOUS at 00:40

## 2021-01-01 RX ADMIN — CHLORHEXIDINE GLUCONATE 15 MILLILITER(S): 213 SOLUTION TOPICAL at 06:23

## 2021-01-01 RX ADMIN — VASOPRESSIN 2.4 UNIT(S)/MIN: 20 INJECTION INTRAVENOUS at 16:00

## 2021-01-01 RX ADMIN — PIPERACILLIN AND TAZOBACTAM 25 GRAM(S): 4; .5 INJECTION, POWDER, LYOPHILIZED, FOR SOLUTION INTRAVENOUS at 11:45

## 2021-01-01 RX ADMIN — VASOPRESSIN 2.4 UNIT(S)/MIN: 20 INJECTION INTRAVENOUS at 00:21

## 2021-01-01 RX ADMIN — Medication 125 MEQ/KG/HR: at 18:48

## 2021-01-01 RX ADMIN — PANTOPRAZOLE SODIUM 40 MILLIGRAM(S): 20 TABLET, DELAYED RELEASE ORAL at 18:02

## 2021-01-01 RX ADMIN — Medication 50 MILLILITER(S): at 17:22

## 2021-01-01 RX ADMIN — Medication 125 MEQ/KG/HR: at 13:13

## 2021-01-01 RX ADMIN — FLUCONAZOLE 100 MILLIGRAM(S): 150 TABLET ORAL at 13:11

## 2021-01-01 RX ADMIN — PANTOPRAZOLE SODIUM 10 MG/HR: 20 TABLET, DELAYED RELEASE ORAL at 23:59

## 2021-01-01 RX ADMIN — PHENYLEPHRINE HYDROCHLORIDE 2.03 MICROGRAM(S)/KG/MIN: 10 INJECTION INTRAVENOUS at 20:24

## 2021-01-01 RX ADMIN — Medication 50 MILLILITER(S): at 11:12

## 2021-01-01 RX ADMIN — PIPERACILLIN AND TAZOBACTAM 25 GRAM(S): 4; .5 INJECTION, POWDER, LYOPHILIZED, FOR SOLUTION INTRAVENOUS at 00:02

## 2021-01-01 RX ADMIN — PANTOPRAZOLE SODIUM 40 MILLIGRAM(S): 20 TABLET, DELAYED RELEASE ORAL at 06:24

## 2021-01-01 RX ADMIN — Medication 125 MEQ/KG/HR: at 09:31

## 2021-01-01 RX ADMIN — VASOPRESSIN 2.4 UNIT(S)/MIN: 20 INJECTION INTRAVENOUS at 06:55

## 2021-01-01 RX ADMIN — Medication 250 MILLIGRAM(S): at 21:58

## 2021-01-01 RX ADMIN — CHLORHEXIDINE GLUCONATE 15 MILLILITER(S): 213 SOLUTION TOPICAL at 17:22

## 2021-01-01 RX ADMIN — Medication 650 MILLIGRAM(S): at 20:43

## 2021-01-01 RX ADMIN — PHENYLEPHRINE HYDROCHLORIDE 2.03 MICROGRAM(S)/KG/MIN: 10 INJECTION INTRAVENOUS at 18:48

## 2021-01-01 RX ADMIN — CHLORHEXIDINE GLUCONATE 1 APPLICATION(S): 213 SOLUTION TOPICAL at 05:43

## 2021-01-01 RX ADMIN — PANTOPRAZOLE SODIUM 10 MG/HR: 20 TABLET, DELAYED RELEASE ORAL at 13:13

## 2021-01-01 RX ADMIN — PROPOFOL 1.44 MICROGRAM(S)/KG/MIN: 10 INJECTION, EMULSION INTRAVENOUS at 19:15

## 2021-01-01 RX ADMIN — PANTOPRAZOLE SODIUM 40 MILLIGRAM(S): 20 TABLET, DELAYED RELEASE ORAL at 17:23

## 2021-01-01 RX ADMIN — PIPERACILLIN AND TAZOBACTAM 25 GRAM(S): 4; .5 INJECTION, POWDER, LYOPHILIZED, FOR SOLUTION INTRAVENOUS at 00:20

## 2021-01-01 RX ADMIN — Medication 125 MEQ/KG/HR: at 06:51

## 2021-01-01 RX ADMIN — SODIUM CHLORIDE 100 MILLILITER(S): 9 INJECTION, SOLUTION INTRAVENOUS at 12:14

## 2021-01-01 RX ADMIN — PANTOPRAZOLE SODIUM 10 MG/HR: 20 TABLET, DELAYED RELEASE ORAL at 19:00

## 2021-01-01 RX ADMIN — SODIUM CHLORIDE 100 MILLILITER(S): 9 INJECTION, SOLUTION INTRAVENOUS at 23:25

## 2021-01-01 RX ADMIN — Medication 400 MILLIGRAM(S): at 04:23

## 2021-01-01 RX ADMIN — CHLORHEXIDINE GLUCONATE 15 MILLILITER(S): 213 SOLUTION TOPICAL at 06:07

## 2021-01-01 RX ADMIN — Medication 650 MILLIGRAM(S): at 21:00

## 2021-01-01 RX ADMIN — CHLORHEXIDINE GLUCONATE 15 MILLILITER(S): 213 SOLUTION TOPICAL at 05:43

## 2021-01-19 PROBLEM — G47.00 INSOMNIA: Status: ACTIVE | Noted: 2021-01-01

## 2021-01-19 NOTE — PHYSICAL EXAM
[No Acute Distress] : no acute distress [Normal Rate] : normal rate  [Regular Rhythm] : with a regular rhythm [No Edema] : there was no peripheral edema [No Focal Deficits] : no focal deficits [Alert and Oriented x3] : oriented to person, place, and time [Normal] : affect was normal and insight and judgment were intact [de-identified] : + tachycardia  [de-identified] : dry skin  [de-identified] : walking with the waker

## 2021-01-19 NOTE — HISTORY OF PRESENT ILLNESS
[FreeTextEntry8] : cc: new pt, a,fib, f/u Anemia, renal problem, generalized weakness \par patient came with her cousin Katy HCP - pt has Hx of fall in 09/2020, Hospitalized , the transfer to [facility], d/c from there on 01/03/021 - now she is home with Aids 24/7. patient has PT only once after d/c from [facility],patient deneis CP,SOB,abf pain, walking with the walker. Pt with HX of Bipolar - f/u with  .

## 2021-01-19 NOTE — REVIEW OF SYSTEMS
[Constipation] : constipation [Joint Pain] : joint pain [Back Pain] : back pain [Unsteady Walking] : ataxia [Depression] : depression [Negative] : Integumentary [Abdominal Pain] : no abdominal pain [Nausea] : no nausea [Diarrhea] : diarrhea [Heartburn] : no heartburn [Vomiting] : no vomiting [Melena] : no melena [Headache] : no headache [Dizziness] : no dizziness [Fainting] : no fainting [Confusion] : no confusion [Memory Loss] : no memory loss [Suicidal] : not suicidal [Anxiety] : no anxiety [FreeTextEntry2] : generalized deconditioning

## 2021-01-19 NOTE — DATA REVIEWED
[FreeTextEntry1] : Hospital record noted\par \par EKG- Sinus tachycardia at 113 bpm, + St - T changes

## 2021-02-11 NOTE — ED PROVIDER NOTE - PSH
History of skin cancer    Plantar fasciitis  left foot   Modified Advancement Flap Text: The defect edges were debeveled with a #15 scalpel blade.  Given the location of the defect, shape of the defect and the proximity to free margins a modified advancement flap was deemed most appropriate.  Using a sterile surgical marker, an appropriate advancement flap was drawn incorporating the defect and placing the expected incisions within the relaxed skin tension lines where possible.    The area thus outlined was incised deep to adipose tissue with a #15 scalpel blade.  The skin margins were undermined to an appropriate distance in all directions utilizing iris scissors.

## 2021-04-13 PROBLEM — Z23 ENCOUNTER FOR IMMUNIZATION: Status: ACTIVE | Noted: 2021-01-01

## 2021-04-13 PROBLEM — E83.52 SERUM CALCIUM ELEVATED: Status: ACTIVE | Noted: 2021-01-01

## 2021-04-13 PROBLEM — I48.91 ATRIAL FIBRILLATION: Status: ACTIVE | Noted: 2021-01-01

## 2021-04-13 PROBLEM — Z00.00 ANNUAL PHYSICAL EXAM: Status: ACTIVE | Noted: 2021-01-01

## 2021-04-13 PROBLEM — N17.9 ARF (ACUTE RENAL FAILURE): Status: ACTIVE | Noted: 2021-01-01

## 2021-04-13 PROBLEM — R63.4 WEIGHT LOSS: Status: ACTIVE | Noted: 2021-01-01

## 2021-04-13 PROBLEM — E34.9 ELEVATED PARATHYROID HORMONE: Status: ACTIVE | Noted: 2021-01-01

## 2021-04-13 PROBLEM — F31.9 BIPOLAR DEPRESSION: Status: ACTIVE | Noted: 2021-01-01

## 2021-04-13 PROBLEM — R53.1 GENERALIZED WEAKNESS: Status: ACTIVE | Noted: 2021-01-01

## 2021-04-13 PROBLEM — Z86.2 HISTORY OF ANEMIA: Status: RESOLVED | Noted: 2021-01-01 | Resolved: 2021-01-01

## 2021-04-13 NOTE — HEALTH RISK ASSESSMENT
[Fair] :  ~his/her~ mood as fair [21-25] : 21-25 [1 or 2 (0 pts)] : 1 or 2 (0 points) [Never (0 pts)] : Never (0 points) [No] : In the past 12 months have you used drugs other than those required for medical reasons? No [Any fall with injury in past year] : Patient reported fall with injury in the past year [Patient declined mammogram] : Patient declined mammogram [Patient declined PAP Smear] : Patient declined PAP Smear [Patient declined bone density test] : Patient declined bone density test [Patient declined colonoscopy] : Patient declined colonoscopy [Retired] : retired [Graduate School] : graduate school [Single] : single [# Of Children ___] : has [unfilled] children [Some assistance needed] : managing finances [Full assistance needed] : using transportation [Smoke Detector] : smoke detector [Carbon Monoxide Detector] : carbon monoxide detector [Seat Belt] :  uses seat belt [With Patient/Caregiver] : With Patient/Caregiver [Designated Healthcare Proxy] : Designated healthcare proxy [Name: ___] : Health Care Proxy's Name: [unfilled]  [Relationship: ___] : Relationship: [unfilled] [DNR] : DNR [DNI] : DNI [FreeTextEntry1] : poor appetite [] : No [de-identified] : [place] [de-identified] : 25 years 1 ppd  [YearQuit] : 19979 [Audit-CScore] : 0 [de-identified] : walking  [de-identified] : regular  [Change in mental status noted] : No change in mental status noted [Language] : denies difficulty with language [Reports changes in hearing] : Reports no changes in hearing [Reports changes in vision] : Reports no changes in vision [Sunscreen] : does not use sunscreen [de-identified] : with the aid 24/7 [AdvancecareDate] : 04/21

## 2021-04-13 NOTE — PHYSICAL EXAM
[No Acute Distress] : no acute distress [No Edema] : there was no peripheral edema [No Focal Deficits] : no focal deficits [Alert and Oriented x3] : oriented to person, place, and time [Normal] : affect was normal and insight and judgment were intact [Declined Breast Exam] : declined breast exam  [de-identified] : + tachycardia  [de-identified] : dry skin  [de-identified] : walking with the waker

## 2021-04-13 NOTE — HISTORY OF PRESENT ILLNESS
[Friend] : friend [FreeTextEntry1] : cc. physical  [de-identified] : Patient was brought by Ginger Escobar HCP  for physical. Denies CP,SOB,Abd pain, no N,V,D.patient  lost weight , 14 lb, she is not in a mood to eat, deneis any discomfort, on and off constipation, better with Coalce and Senna  She completed PT at home, walking with the walker, dose her own exercise.

## 2021-04-13 NOTE — REVIEW OF SYSTEMS
[Recent Change In Weight] : ~T recent weight change [Constipation] : constipation [Negative] : Neurological [Abdominal Pain] : no abdominal pain [Nausea] : no nausea [Diarrhea] : diarrhea [Vomiting] : no vomiting [Heartburn] : no heartburn [Melena] : no melena [FreeTextEntry2] : 14 lb [FreeTextEntry7] : not in the mood to eat

## 2021-04-13 NOTE — COUNSELING
[Fall prevention counseling provided] : Fall prevention counseling provided [Adequate lighting] : Adequate lighting [No throw rugs] : No throw rugs [Use proper foot wear] : Use proper foot wear [Use recommended devices] : Use recommended devices [Weigh Self Weekly] : weigh self weekly [Needs reinforcement, provided] : Patient needs reinforcement on understanding of lifestyle changes and steps needed to achieve self management goal; reinforcement was provided [FreeTextEntry2] : increase PO intake, Ensure

## 2021-05-13 NOTE — H&P ADULT - NSHPLABSRESULTS_GEN_ALL_CORE
14.6   3.20  )-----------( 226      ( 13 May 2021 17:50 )             48.5       05-13    145  |  113<H>  |  51<H>  ----------------------------<  126<H>  4.7   |  13<L>  |  2.82<H>    Ca    10.0      13 May 2021 17:50    TPro  5.9<L>  /  Alb  2.3<L>  /  TBili  0.5  /  DBili  x   /  AST  31  /  ALT  14  /  AlkPhos  60  05-13      CARDIAC MARKERS ( 13 May 2021 17:50 )  .094 ng/mL / x     / x     / x     / x                    ABG - ( 13 May 2021 21:16 )  pH, Arterial: 7.36  pH, Blood: x     /  pCO2: 34    /  pO2: 135   / HCO3: x     / Base Excess: x     /  SaO2: 98

## 2021-05-13 NOTE — ED ADULT NURSE NOTE - NS ED NURSE TRANSPORT WITH
Cardiac Monitor/Defib/ACLS/Rescue Kit/O2/BVM/oxygen/IV pump/pulse ox/ventilator/Blood Products/ACLS Rescue Kit

## 2021-05-13 NOTE — H&P ADULT - ASSESSMENT
Assessment/Plan:  74 yo F pmhx Bipolar, Afib on Eliquis presented with unrespnosiveness and GI bleed. Admitted with hemorrhagic shock s/p MTP protocol    Neuro: Sedation w/ Propofol gtt. Patient awake and following commands when sedation titrated down. CTH/CT neck negative. Check Alcohol/Utox/APAP/ASA levels  CV: Hypovolemic shock; BP improved s/p blood products. Continue resuscitative efforts to maintain MAP >65. Holding ASA and anti-hypertensives  Resp: Acute hypoxic respiratory failure s/p intubation. ABG noted, FiO2 titrated down. Current vent settings 20/400/50/+5 Actively titrating for O2 sat >90%. CT chest w/ apparent RLL pneumonia, wait for official read  GI: Upper GI bleed. NGT placed w/ ~500cc coffee ground emesis drained. Started on Protonix gtt. GI Dr. Zimmer consulted by ED attending. Maintain NPO.  Renal: Strict I's and O's. Monitor electrolytes and supplement as needed  ID: Pan cultured. Start empiric Zosyn for concern aspiration event  Heme: Hemorrhagic shock secondary to GIB. s/p MTP (Transfused 4 PRBC, 4 FFP, and 1u PLT total). Trend H/H, transfuse to keep Hgb >7  Endo: Close glycemic monitoring    Discussed plan w/ Intensivist Dr. Ferro. Admit to ICU    Critical care time:L 50 minutes including time spent reviewing chart, ordering tests/labs, discussing with interdisciplinary team. Not including time spent performing procedures. Assessment/Plan:  74 yo F pmhx Bipolar, Afib on Eliquis presented with unrespnosiveness and GI bleed. Admitted with hemorrhagic shock s/p MTP protocol    Neuro: Sedation w/ Propofol gtt. Patient awake and following commands when sedation titrated down. CTH/CT neck negative. Check Alcohol/Utox/APAP/ASA levels  CV: Hypovolemic shock; BP improved s/p blood products. Continue resuscitative efforts to maintain MAP >65. Holding ASA and anti-hypertensives  Resp: Acute hypoxic respiratory failure s/p intubation. ABG noted, FiO2 titrated down. Current vent settings 20/400/50/+5 Actively titrating for O2 sat >90%.   GI: Upper GI bleed. NGT placed w/ ~500cc coffee ground emesis drained. Started on Protonix gtt. GI Dr. Zimmer consulted by ED attending. Maintain NPO.  Renal: Strict I's and O's. Monitor electrolytes and supplement as needed  ID: Pan cultured. Start empiric Zosyn   Heme: Hemorrhagic shock secondary to GIB. s/p MTP (Transfused 4 PRBC, 4 FFP, and 1u PLT total). Trend H/H, transfuse to keep Hgb >7  Endo: Close glycemic monitoring    Discussed plan w/ Intensivist Dr. Ferro. Admit to ICU    Critical care time:L 50 minutes including time spent reviewing chart, ordering tests/labs, discussing with interdisciplinary team. Not including time spent performing procedures.

## 2021-05-13 NOTE — ED PROVIDER NOTE - OBJECTIVE STATEMENT
76 y/o F on Eliquis found unresponsive at home. As per EMS unsure of relationship of person who called EMS- ? neighbor? No medical history obtained. Left tibial IO in place, Intubated in field- (+) coffee ground emesis.   Hypotensive, tachycardic upon arrival. Code status unknown- only number EMS had was [phone]- no answer 74 y/o F on Eliquis found unresponsive at home. As per EMS unsure of relationship of person who called EMS- ? neighbor? No medical history obtained. Left tibial IO in place, Intubated in field- (+) coffee ground emesis.   Hypotensive, tachycardic upon arrival. FS in field 162. Code status unknown- only number EMS had was [phone]- no answer 76 y/o F h/o Afib (on Eliquis from previous admission 9/2020) found unresponsive at home. As per EMS unsure of relationship of person who called EMS- ? neighbor? No medical history obtained. Left tibial IO in place, Intubated in field- (+) coffee ground emesis. Hypotensive, tachycardic upon arrival. FS in field 162. Code status unknown- only number EMS had was [phone]- no answer. 76 y/o F h/o Bipolar, Afib (on Eliquis from previous admission 9/2020) found unresponsive at home. As per EMS unsure of relationship of person who called EMS- ? neighbor? No medical history obtained. Left tibial IO in place, Intubated in field- (+) coffee ground emesis. Hypotensive, tachycardic upon arrival. FS in field 162. Code status unknown- only number EMS had was [phone]- no answer. Alar Island Pedicle Flap Text: The defect edges were debeveled with a #15 scalpel blade.  Given the location of the defect, shape of the defect and the proximity to the alar rim an island pedicle advancement flap was deemed most appropriate.  Using a sterile surgical marker, an appropriate advancement flap was drawn incorporating the defect, outlining the appropriate donor tissue and placing the expected incisions within the nasal ala running parallel to the alar rim. The area thus outlined was incised with a #15 scalpel blade.  The skin margins were undermined minimally to an appropriate distance in all directions around the primary defect and laterally outward around the island pedicle utilizing iris scissors.  There was minimal undermining beneath the pedicle flap.

## 2021-05-13 NOTE — ED ADULT NURSE NOTE - OBJECTIVE STATEMENT
As per EMS pt found at home by family unresponsive for unknown period of time. Coffee ground emesis found in patient mouth. Pt intubated with 6.5 ett prior to arrival to ED. Etomidate 20mg and 50mcg fentanyl administered en route to ED.

## 2021-05-13 NOTE — ED PROVIDER NOTE - PROGRESS NOTE DETAILS
RERE Pattno- bedside heme occult (+) code fusion called RERE Desiro- ICU RERE Schaefer called RERE Patton- call from family member Katy (cousin [phone])- her only family member. States she had conversation with patient recently who requested DNR/DNI also discussed with Dr. Cunningham. Call put into Dr. Cunningham for confirmation. Neighbor arrived to ED who states living in horrible conditions, excess ETOH intake- "100's of beer bottles in her garage", recently losing weight, decreased PO intake. case d/w dr barnard 6.15 pm  case d/w dr brasher 6.30 pm -stated pt cousin had discussion about dnr no mohs signed

## 2021-05-13 NOTE — H&P ADULT - NSHPPHYSICALEXAM_GEN_ALL_CORE
Gen: Critically ill, intubated  CV: Tachycardia  Resp: CTA  GI: firm, NT, NGT w/ coffee ground output  Extrem: warm, dry, no edmea

## 2021-05-13 NOTE — ED PROVIDER NOTE - CARE PLAN
Principal Discharge DX:	GI bleed requiring more than 4 units of blood in 24 hours, ICU, or surgery  Secondary Diagnosis:	Hemorrhagic shock

## 2021-05-13 NOTE — ED PROVIDER NOTE - CLINICAL SUMMARY MEDICAL DECISION MAKING FREE TEXT BOX
74 y/o F on Eliquis found unresponsive at home. As per EMS unsure of relationship of person who called EMS- ? neighbor? No medical history obtained. Left tibial IO in place, Intubated in field- (+) coffee ground emesis.   Hypotensive, tachycardic upon arrival. Code status unknown- only number EMS had was [phone]- no answer, no voicemail set up. Occult positive at bedside. Code fusion called.   Plan: Sepsis and cardiac work up, fluids, code fusion/transfuse, ICU admission 74 y/o F on Eliquis found unresponsive at home. As per EMS unsure of relationship of person who called EMS- ? neighbor? No medical history obtained. Left tibial IO in place, Intubated in field- (+) coffee ground emesis.   Hypotensive, tachycardic upon arrival. FS in field 162. Code status unknown- only number EMS had was [phone]- no answer, no voicemail set up. Occult positive at bedside. Code fusion called.   Plan: Sepsis and cardiac work up, fluids, code fusion/transfuse, ICU admission 74 y/o F h/o Afib (on Eliquis from previous admission 9/2020) found unresponsive at home. As per EMS unsure of relationship of person who called EMS- ? neighbor? No medical history obtained. Left tibial IO in place, Intubated in field- (+) coffee ground emesis. Hypotensive, tachycardic upon arrival. FS in field 162. Code status unknown- only number EMS had was [phone]- no answer.  Plan: Sepsis and cardiac work up, EKG, CXR, CT head, fluids, code fusion/transfuse, ICU admission 74 y/o F h/o Bipolar, Afib (on Eliquis from previous admission 9/2020) found unresponsive at home. As per EMS unsure of relationship of person who called EMS- ? neighbor? No medical history obtained. Left tibial IO in place, Intubated in field- (+) coffee ground emesis. Hypotensive, tachycardic upon arrival. FS in field 162. Code status unknown- only number EMS had was [phone]- no answer.  Plan: Sepsis and cardiac work up, EKG, CXR, CT head, fluids, code fusion/transfuse, ICU admission    **update: @1815 all from family member Katy (cousin [phone])- her only family member. States she had conversation with patient recently who requested DNR/DNI also discussed with Dr. Cunningham. Call put into Dr. Cunningham for confirmation. Neighbor (who called EMS) then arrived to ED who states living in horrible conditions, excess ETOH intake- "100's of beer bottles in her garage", recently losing weight, decreased PO intake.

## 2021-05-13 NOTE — ED PROVIDER NOTE - ATTENDING CONTRIBUTION TO CARE
76 y/o F h/o Bipolar, Afib (on Eliquis from previous admission 9/2020) found unresponsive at home. As per EMS unsure of relationship of person who called EMS- ? neighbor? No medical history obtained. Left tibial IO in place, Intubated in field- (+) coffee ground emesis. Hypotensive, tachycardic upon arrival. FS in field 162. Code status unknown- only number EMS had was [phone]- no answer.  Plan: Sepsis and cardiac work up, EKG, CXR, CT head, fluids, code fusion/transfuse, ICU admission    **update: @1815 all from family member Katy (cousin [phone])- her only family member. States she had conversation with patient recently who requested DNR/DNI also discussed with Dr. Cunningham. Call put into Dr. Cunningham for confirmation. Neighbor (who called EMS) then arrived to ED who states living in horrible conditions, excess ETOH intake- "100's of beer bottles in her garage", recently losing weight, decreased PO intake.  hb 14 ngt 1 l coffee ground blood guiac + brown stool   pt admitted to icu  Dr. Odell:  I have reviewed and discussed with the PA/ resident the case specifics, including the history, physical assessment, evaluation, conclusion, laboratory results, and medical plan. I agree with the contents, and conclusions. I have personally examined, and interviewed the patient. 76 y/o F h/o Bipolar, Afib (on Eliquis from previous admission 9/2020) found unresponsive at home. As per EMS unsure of relationship of person who called EMS- ? neighbor? No medical history obtained. Left tibial IO in place, Intubated in field- (+) coffee ground emesis. Hypotensive, tachycardic upon arrival. FS in field 162. Code status unknown- only number EMS had was [phone]- no answer.  Plan: Sepsis and cardiac work up, EKG, CXR, CT head, fluids, code fusion/transfuse, ICU admission    **update: @1815 all from family member Katy (cousin [phone])- her only family member. States she had conversation with patient recently who requested DNR/DNI also discussed with Dr. Cunningham. Call put into Dr. Cunningham for confirmation. Neighbor (who called EMS) then arrived to ED who states living in horrible conditions, excess ETOH intake- "100's of beer bottles in her garage", recently losing weight, decreased PO intake.  hb 14 ngt 1 l coffee ground blood guiac + brown stool   pt admitted to icu  Patient was critically ill with a high probability of imminent or life threatening deterioration.  direct patient care (not related to procedure), additional history taking, interpretation of diagnostic studies, documentation, consultation with other physicians, telephone consultation with the patient's family  I have personally and independently provided the amount of critical care time documented below excluding time spent on separate procedures.  40 mins    Dr. Odell:  I have reviewed and discussed with the PA/ resident the case specifics, including the history, physical assessment, evaluation, conclusion, laboratory results, and medical plan. I agree with the contents, and conclusions. I have personally examined, and interviewed the patient.

## 2021-05-13 NOTE — PROCEDURE NOTE - NSPROCDETAILS_GEN_ALL_CORE
ultrasound assessment/guidewire recovered/lumen(s) aspirated and flushed/sterile dressing applied/sterile technique, catheter placed/ultrasound guidance with use of sterile gel and probe cove

## 2021-05-14 NOTE — CONSULT NOTE ADULT - ASSESSMENT
A/P 76 yo F pmhx Bipolar, Afib on Eliquis presented with unrespnosiveness and GI bleed. Admitted with hemorrhagic shock s/p MTP protocol.  overnight patient became more hypotensive started on two pressors  propofol tapered off  patient eyes closed, not following commands  At this time patient intubated, sedated, on 2 pressor, tachycardic.    Per CCU:   Assessment  Shock suspect septic at this time, suspect intra abdominal cause, ? Mesenteric Ischemia  Upper GI bleed suspected s/p Code Fusion in ED, but no gross bleeding noted.     h/h stable    hypovolumic shock less likely   Lactic acidosis due to above  Leuokopenia due to above   worsening LFTS - suspect will progress to shock liver  Underlying Afib on Eliquis    Underlying bipolar disorder, h/o skin cancer,     Plan  Continue mechanical ventilation  Pressors to maintain BP  Monitor Labs  NPO  NGT to suction  Abx  f/u cultures  NPO  Surgical Consult  GI Consult  ID consult      Palliative :  asked for assist w/ Goc, case reviewed w/ ccu team this AM, chart reviewed, and pt seen bedside.  Pt currently intubated and unresponsive.  Ccu team made contact with HCP /cousin Katy who says she will be in this afternoon with paperwork/advanced directives.   I also called and left  for cousin Katy # [phone].    Plan to meet with family this afternoon, review any paperwork, and review Molst.  
a/p    likely mesenteric ischemia with septic shock, lactic acidosis, renal failure and leukopenia.    needs aggressive fluid, pressor, iv resuscitation.      I will reevaluate in a few hours.    thank you    dr radha cardona  cell#[phone]

## 2021-05-14 NOTE — PROGRESS NOTE ADULT - SUBJECTIVE AND OBJECTIVE BOX
Patient is a 75y old  Female who presents with a chief complaint of GI Bleed (14 May 2021 00:54)    HPI:  Patient is a 75y old  Female who presents with a chief complaint of GI BLeed    HPI:  74 yo F pmhx Bipolar, Afib on Eliquis presented to ED tonight after episode of vomiting at home. Per cousin, patient at home with 24hr HHA who noticed patient to have vomited and unreponsive. Upon arrival to ED patient found to be hypotensive and intubated for poor mental status. NGT placed with ~500cc coffee ground emesis obtained. Code MTP called due to concern for hemorrhagic shock in setting of GI bleed. Per documentation patient is on Eliquis at home and ASA. Received total 4PRBC, 4 cryoprecipitate, 1u PLT w/ improvement in BP.     Patient progressively hypotensive tonight now febrile. Fluid resuscitated with crystalloid and started on vasopressors.    Allergies: No Known Allergies    PAST MEDICAL & SURGICAL HISTORY:  Bipolar 1 disorder    Skin cancer  Basla cell &amp; squamous cells    Frozen shoulder    Plantar fasciitis  left foot    History of skin cancer      FAMILY HISTORY:  Family history of acute myocardial infarction (Father)      SOCIAL HISTORY:    Home Medications:    Review of Systems:  Unable to participate in ROS    T(F): 101.2 (21 @ 04:00), Max: 101.2 (21 @ 04:00)  HR: 115 (21 @ 04:18) (115 - 157)  BP: 64/24 (21 @ 04:15) (47/35 - 142/84)  RR: 28 (21 @ 04:15) (20 - 34)  SpO2: 100% (21 @ 04:18)  Wt(kg): --  Mode: AC/ CMV (Assist Control/ Continuous Mandatory Ventilation), RR (machine): 20, TV (machine): 400, FiO2: 50, PEEP: 5  CAPILLARY BLOOD GLUCOSE      POCT Blood Glucose.: 85 mg/dL (13 May 2021 23:24)    I&O's Summary    13 May 2021 07:01  -  14 May 2021 05:37  --------------------------------------------------------  IN: 1269.2 mL / OUT: 0 mL / NET: 1269.2 mL        Physical Exam:     Gen: critically ill  Neuro: sedated  CVS: tachycardic  Resp: CTA, intubated  Abd: firm, NT, ND  Ext: warm, dry, no edema    Meds:  piperacillin/tazobactam IVPB.. 3.375 Gram(s) IV Intermittent every 12 hours    norepinephrine Infusion 0.05 MICROgram(s)/kG/Min IV Continuous <Continuous>  phenylephrine    Infusion 0.1 MICROgram(s)/kG/Min IV Continuous <Continuous>           ARIPiprazole 5 milliGRAM(s) Oral daily  mirtazapine 15 milliGRAM(s) Oral at bedtime  propofol Infusion 5 MICROgram(s)/kG/Min IV Continuous <Continuous>           pantoprazole Infusion 8 mG/Hr IV Continuous <Continuous>        sodium chloride 0.9% lock flush 10 milliLiter(s) IV Push every 1 hour PRN  sodium chloride 0.9%. 1000 milliLiter(s) IV Continuous <Continuous>        chlorhexidine 0.12% Liquid 15 milliLiter(s) Oral Mucosa every 12 hours  chlorhexidine 4% Liquid 1 Application(s) Topical <User Schedule>                              17.1   1.51  )-----------( 153      ( 14 May 2021 03:50 )             50.4       05-14    145  |  110<H>  |  60<H>  ----------------------------<  71  5.3   |  21<L>  |  3.25<H>    Ca    9.9      14 May 2021 03:50  Phos  4.3     -  Mg     2.2     -    TPro  6.0  /  Alb  2.4<L>  /  TBili  1.6<H>  /  DBili  x   /  AST  159<H>  /  ALT  83<H>  /  AlkPhos  70  -    Lactate 4.9           -14 @ 03:50    Lactate 4.0           05-13 @ 21:10      CARDIAC MARKERS ( 13 May 2021 17:50 )  .094 ng/mL / x     / x     / x     / x          PT/INR - ( 14 May 2021 03:50 )   PT: 20.0 sec;   INR: 1.69 ratio         PTT - ( 14 May 2021 03:50 )  PTT:31.4 sec  Urinalysis Basic - ( 13 May 2021 21:10 )    Color: Yellow / Appearance: Clear / S.010 / pH: x  Gluc: x / Ketone: Negative  / Bili: Negative / Urobili: Negative   Blood: x / Protein: 100 / Nitrite: Negative   Leuk Esterase: Trace / RBC: 5-10 /HPF / WBC 0-2 /HPF   Sq Epi: x / Non Sq Epi: Neg.-Few / Bacteria: Few /HPF            ABG - ( 13 May 2021 21:16 )  pH, Arterial: 7.36  pH, Blood: x     /  pCO2: 34    /  pO2: 135   / HCO3: x     / Base Excess: x     /  SaO2: 98                Radiology:   XAM:  CT CERVICAL SPINE    EXAM:  CT BRAIN      PROCEDURE DATE:  2021        INTERPRETATION:  Clinical Indication: Unresponsive, GI bleed    Brain CT:    5mm axial sections of the brain were obtained from base to vertex, without the intravenous administration of contrast material. Coronal and sagittal computer generated reconstructed views are available.    Comparison is made with the prior CT of 2020 and demonstrates no significant interval change. However    The ventricles and sulci are prominent consistent age appropriate involutional changes. Small vessel white matter ischemic changes are noted. There is no hemorrhage, mass, or shift of midline structures. Bone window examination is unremarkable.      Cervical spine CT:    Serial thin sections on a multi slice scanner were obtained through the cervical spine from the C1 to the T1-2 level in a stacked axial fashion reformatted at 1.25 mm sections with sagittal and coronal computer generated reconstructed views.    No prior spine imaging is available for comparison.    The cervical vertebrae are normal in height and density. There is straightening of the normal cervical lordosis. There is disc space narrowing at C5-6 and C6-7. Mild degenerative anterolisthesis is present at C3-4. An endotracheal and nasogastric tube are in place. There is a left internal jugular venous catheter.        IMPRESSION:    Brain CT: Age-appropriate involutional and ischemic gliotic changes without significant change since 2020.    Cervical spine CT: Degenerative changes. No acute fractures or dislocations.                MIRELA CRAWLEY MD, ATTENDING RADIOLOGIST  This document has been electronically signed. May 13 2021  7:47PM    Septic shock  GI Bleed  Acute renal failure  Colitis    Assessment/Plan:  74 yo F pmhx Bipolar, Afib on Eliquis presented with unresponsiveness and GI bleed. Admitted with hemorrhagic shock s/p MTP protocol. Patient now with progressive hypotension and ongoing shock state    -Progressive hypotension; bedside POCUS w/ IVC 1.5cm with respirophasic movements. Received 2L crystalloid and started on Phenylephrine gtt. Lactate 4.0->4.9. Continue to resuscitate with serial POCUS  -CTAP demonstrated large amount of stool w/ sterocolitis. s/p manual disimpaction now given enema  -Maintain Protonix for coffee ground emesis. NGT to ILWS  -Acute renal failure likely in setting of ATN/shock state. Clarke in place creating adequate UO  -Patient now septic and febrile w/ leukopenia. Empiric abx coverage w/ Zosyn. Await cultures and sensitivity  -Repeat CBC w/ rising Hgb. Hold further transfusion at this time  -DVT PPX: SCD    Discussed case with e-icu attending    Critical care time: 60 minutes including time spent reviewing chart, ordering tests/labs, discussing with interdisciplinary team. Not including time spent performing procedures.

## 2021-05-14 NOTE — CHART NOTE - NSCHARTNOTEFT_GEN_A_CORE
remains on 2 pressors with some urine output  does not seem to be actively bleeding at this time    has not received vanco and gentamycin yet-just being administered by RN    exam is essentially unchanged    labs:  Lactate, Blood: 5.3 mmol/L (05.14.21 @ 12:30)   omprehensive Metabolic Panel (05.14.21 @ 12:30)   Sodium, Serum: 144 mmol/L   Potassium, Serum: 4.6 mmol/L   Chloride, Serum: 112 mmol/L   Carbon Dioxide, Serum: 20 mmol/L   Anion Gap, Serum: 12 mmol/L   Blood Urea Nitrogen, Serum: 58 mg/dL   Creatinine, Serum: 3.45 mg/dL   Glucose, Serum: 115 mg/dL   Calcium, Total Serum: 7.7 mg/dL   Protein Total, Serum: 4.7 g/dL       BC Count: 2.71 K/uL   RBC Count: 5.25 M/uL   Hemoglobin: 15.7 g/dL   Hematocrit: 47.4 %   Mean Cell Volume: 90.3 fl   Mean Cell Hemoglobin: 29.9 pg   Mean Cell Hemoglobin Conc: 33.1 gm/dL       a/p    patient still behing in resuscitation-need to max out fluid resuscitation and IV antibiotics.      would recommend continue mgt and repeat labs this evening.    although lactate up a little, wbc is also up which in this case is encouraging trend as patient is leukopenic and may now be mounting a response.    I believe mesenteric ischemia is the underlying etiology and I do not see any evidence up to now of perforation.    I will follow closely with ICU team.      dr radha cardona  cell#[phone]

## 2021-05-14 NOTE — CONSULT NOTE ADULT - SUBJECTIVE AND OBJECTIVE BOX
NEPHROLOGY CONSULTATION    CHIEF COMPLAINT: GIB    HPI:  Pt is 76 yo F w/pmh Bipolar d/o, Afib on Eliquis presented to ED  after episode of vomiting at home. Per chart, patient at home with 24hr HHA who noticed patient to have vomited and became unresponsive. Upon arrival to ED patient found to be hypotensive and intubated for poor mental status. NGT placed with ~ 500cc coffee ground emesis obtained. Code MTP called due to concern for hemorrhagic shock in setting of GI bleed. Per documentation patient is on Eliquis at home and ASA. Received total 4PRBC, 4 cryoprecipitate, 1u PLT w/ improvement in BP. Noted to have fever, MAYELIN.    ROS:  as above    Allergies:  No Known Allergies    PAST MEDICAL & SURGICAL HISTORY:  Bipolar 1 disorder  Skin cancer  Frozen shoulder  Plantar fasciitis left foot  History of skin cancer    SOCIAL HISTORY:  negative    FAMILY HISTORY:  Family history of acute myocardial infarction (Father)    MEDICATIONS  (STANDING):  chlorhexidine 0.12% Liquid 15 milliLiter(s) Oral Mucosa every 12 hours  chlorhexidine 4% Liquid 1 Application(s) Topical <User Schedule>  norepinephrine Infusion 0.05 MICROgram(s)/kG/Min (2.53 mL/Hr) IV Continuous <Continuous>  pantoprazole Infusion 8 mG/Hr (10 mL/Hr) IV Continuous <Continuous>  phenylephrine    Infusion 0.1 MICROgram(s)/kG/Min (2.03 mL/Hr) IV Continuous <Continuous>  piperacillin/tazobactam IVPB.. 3.375 Gram(s) IV Intermittent every 12 hours  propofol Infusion 5 MICROgram(s)/kG/Min (1.44 mL/Hr) IV Continuous <Continuous>  sodium bicarbonate  Infusion 0.347 mEq/kG/Hr (125 mL/Hr) IV Continuous <Continuous>  sodium chloride 0.9% Bolus 1000 milliLiter(s) IV Bolus once  sodium chloride 0.9%. 1000 milliLiter(s) (100 mL/Hr) IV Continuous <Continuous>    Vital Signs Last 24 Hrs  T(C): 38.6 (21 @ 08:00), Max: 38.6 (21 @ 08:00)  T(F): 101.5 (21 @ 08:00), Max: 101.5 (21 @ 08:00)  HR: 143 (21 @ 10:30) (115 - 157)  BP: 96/63 (21 @ 10:30) (36/18 - 142/84)  BP(mean): 75 (21 @ 10:30) (25 - 97)  RR: 29 (21 @ 10:30) (20 - 34)  SpO2: 99% (21 @ 10:30) (90% - 100%)    Mode: AC/ CMV (Assist Control/ Continuous Mandatory Ventilation)  RR (machine): 20  TV (machine): 400  FiO2: 50  PEEP: 5  MAP: 11  PIP: 26    s1s2  b/l air entry  soft, ND  edema    LABS:                        17.1   1.51  )-----------( 153      ( 14 May 2021 03:50 )             50.4     -    145  |  110<H>  |  60<H>  ----------------------------<  71  5.3   |  21<L>  |  3.25<H>    Ca    9.9      14 May 2021 03:50  Phos  4.3       Mg     2.2         TPro  6.0  /  Alb  2.4<L>  /  TBili  1.6<H>  /  DBili  x   /  AST  159<H>  /  ALT  83<H>  /  AlkPhos  70      Urinalysis Basic - ( 13 May 2021 21:10 )    Color: Yellow / Appearance: Clear / S.010 / pH: x  Gluc: x / Ketone: Negative  / Bili: Negative / Urobili: Negative   Blood: x / Protein: 100 / Nitrite: Negative   Leuk Esterase: Trace / RBC: 5-10 /HPF / WBC 0-2 /HPF   Sq Epi: x / Non Sq Epi: Neg.-Few / Bacteria: Few /HPF    LIVER FUNCTIONS - ( 14 May 2021 03:50 )  Alb: 2.4 g/dL / Pro: 6.0 g/dL / ALK PHOS: 70 U/L / ALT: 83 U/L / AST: 159 U/L / GGT: x           PT/INR - ( 14 May 2021 03:50 )   PT: 20.0 sec;   INR: 1.69 ratio      PTT - ( 14 May 2021 03:50 )  PTT:31.4 sec    A/P:       NEPHROLOGY CONSULTATION    CHIEF COMPLAINT: GIB    HPI:  Pt is 76 yo F w/pmh Bipolar d/o, Afib on Eliquis presented to ED  after episode of vomiting at home. Per chart, patient at home with 24hr HHA who noticed patient had vomited and became unresponsive. Upon arrival to ED patient found to be hypotensive and intubated for poor mental status. NGT placed and drained ~ 500cc of coffee ground emesis. Per documentation patient was on Eliquis and ASAas op. Received total 4u PRBC, 4u cryoprecipitate. Noted to have fever, MAYELIN. Hx from chart as pt is unable to provide hx or ROS.    ROS:  as above    Allergies:  No Known Allergies    PAST MEDICAL & SURGICAL HISTORY:  Bipolar 1 disorder  Skin cancer  Frozen shoulder  Plantar fasciitis left foot  History of skin cancer  CKD 3    SOCIAL HISTORY:  negative    FAMILY HISTORY:  Family history of acute myocardial infarction (Father)    MEDICATIONS  (STANDING):  chlorhexidine 0.12% Liquid 15 milliLiter(s) Oral Mucosa every 12 hours  chlorhexidine 4% Liquid 1 Application(s) Topical <User Schedule>  norepinephrine Infusion 0.05 MICROgram(s)/kG/Min (2.53 mL/Hr) IV Continuous <Continuous>  pantoprazole Infusion 8 mG/Hr (10 mL/Hr) IV Continuous <Continuous>  phenylephrine    Infusion 0.1 MICROgram(s)/kG/Min (2.03 mL/Hr) IV Continuous <Continuous>  piperacillin/tazobactam IVPB.. 3.375 Gram(s) IV Intermittent every 12 hours  propofol Infusion 5 MICROgram(s)/kG/Min (1.44 mL/Hr) IV Continuous <Continuous>  sodium bicarbonate  Infusion 0.347 mEq/kG/Hr (125 mL/Hr) IV Continuous <Continuous>  sodium chloride 0.9% Bolus 1000 milliLiter(s) IV Bolus once  sodium chloride 0.9%. 1000 milliLiter(s) (100 mL/Hr) IV Continuous <Continuous>    Vital Signs Last 24 Hrs  T(C): 38.6 (21 @ 08:00), Max: 38.6 (21 @ 08:00)  T(F): 101.5 (21 @ 08:00), Max: 101.5 (21 @ 08:00)  HR: 143 (21 @ 10:30) (115 - 157)  BP: 96/63 (21 @ 10:30) (36/18 - 142/84)  BP(mean): 75 (21 @ 10:30) (25 - 97)  RR: 29 (21 @ 10:30) (20 - 34)  SpO2: 99% (21 @ 10:30) (90% - 100%)    I&O's Detail    13 May 2021 07:01  -  14 May 2021 07:00  --------------------------------------------------------  IN:    IV PiggyBack: 100 mL    Lactated Ringers: 200 mL    Lactated Ringers Bolus: 2000 mL    Norepinephrine: 24.3 mL    Pantoprazole: 90 mL    Phenylephrine: 15.2 mL    Propofol: 29.2 mL    Sodium Bicarbonate: 300 mL    sodium chloride 0.9%: 1300 mL  Total IN: 4058.6 mL    OUT:    Indwelling Catheter - Urethral (mL): 270 mL    Nasogastric/Oral tube (mL): 900 mL  Total OUT: 1170 mL    Total NET: 2888.6 mL    14 May 2021 07:01  -  14 May 2021 12:37  --------------------------------------------------------  IN:    Norepinephrine: 55.7 mL    Pantoprazole: 40 mL    Phenylephrine: 111.5 mL    Sodium Bicarbonate: 600 mL    sodium chloride 0.9%: 400 mL  Total IN: 1207.2 mL    OUT:    Indwelling Catheter - Urethral (mL): 65 mL    Propofol: 0 mL  Total OUT: 65 mL    Total NET: 1142.2 mL    Mode: AC/ CMV (Assist Control/ Continuous Mandatory Ventilation)  RR (machine): 20  TV (machine): 400  FiO2: 50  PEEP: 5  MAP: 11  PIP: 26    s1s2  b/l air entry  soft, ND  tr edema    LABS:                        17.1   1.51  )-----------( 153      ( 14 May 2021 03:50 )             50.4     05-14    145  |  110<H>  |  60<H>  ----------------------------<  71  5.3   |  21<L>  |  3.25<H>    Ca    9.9      14 May 2021 03:50  Phos  4.3     -  Mg     2.2     -    TPro  6.0  /  Alb  2.4<L>  /  TBili  1.6<H>  /  DBili  x   /  AST  159<H>  /  ALT  83<H>  /  AlkPhos  70  -    Urinalysis Basic - ( 13 May 2021 21:10 )    Color: Yellow / Appearance: Clear / S.010 / pH: x  Gluc: x / Ketone: Negative  / Bili: Negative / Urobili: Negative   Blood: x / Protein: 100 / Nitrite: Negative   Leuk Esterase: Trace / RBC: 5-10 /HPF / WBC 0-2 /HPF   Sq Epi: x / Non Sq Epi: Neg.-Few / Bacteria: Few /HPF    LIVER FUNCTIONS - ( 14 May 2021 03:50 )  Alb: 2.4 g/dL / Pro: 6.0 g/dL / ALK PHOS: 70 U/L / ALT: 83 U/L / AST: 159 U/L / GGT: x           PT/INR - ( 14 May 2021 03:50 )   PT: 20.0 sec;   INR: 1.69 ratio      PTT - ( 14 May 2021 03:50 )  PTT:31.4 sec    A/P:    Shock, GIB, fever, intubated in ICU  Septic/hemodynamic ATN on CKD 3 (Cr 1.36 - 21)  Work/up, Abx per ID  Will f/u Bld Cx results  Will f/u CT A/P results  Clarke, I/Os  No nephrotoxins  Goal SBP > 90  NPO, IVF  Prognosis is guarded overall    [phone]

## 2021-05-14 NOTE — DIETITIAN NUTRITION RISK NOTIFICATION - ADDITIONAL COMMENTS/DIETITIAN RECOMMENDATIONS
1. Continue NPO diet order, advance diet when medically appropriate.   2. if PO intake is appropriate, recommend puree diet w/ thin liquids. Diet texture/modifications per SLP recommendations.   3. If PO intake is not appropriate, recommend continuous enteral nutrition feeding of Jevity 1.5 at 50mL/hr, which will provide 1,200mL total volume, 1,800 calories, 77 g protein, 912mL water. Recommend free water 40ml/hr to provide an additional 960mL free water.

## 2021-05-14 NOTE — DIETITIAN INITIAL EVALUATION ADULT. - PERTINENT LABORATORY DATA
(5/14)  Na: 145, K: 5.3, BUN: 60 H, Cr: 3.25 H, Glu: 71    CAPILLARY BLOOD GLUCOSE:  POCT Blood Glucose.: 153 mg/dL (14 May 2021 07:30)  POCT Blood Glucose.: 54 mg/dL (14 May 2021 07:01)  POCT Blood Glucose.: 54 mg/dL (14 May 2021 06:58)  POCT Blood Glucose.: 85 mg/dL (13 May 2021 23:24)

## 2021-05-14 NOTE — PROGRESS NOTE ADULT - ASSESSMENT
Full consult to follow  D/w Dr Ferro 75y female with Bipolar d/o, Afib, presented to ED 5/13 after episode of vomiting at home and episode of unresponsive. In ER  patient was  hypotensive and intubated for  airway protection. NGT placed 500cc coffee ground emesis drained. Pt received blood transfusion, now intubated, she was also noted to have leukopenia and fever and now on broad spectrum coverage. She is not able to provide any history at present  Given fever, neutropenia and GI bleed, suspect possible bowel source, ? neutropenic typhlitis, GNR sepsis    PLAN:  Agree with broad antimicrobials  Empiric Zosyn for now. will also dose Vanc and Gent to cover for possible resistant scott  f/u abd imaging, GI/surgery eval  d/w ICU team  D/w Dr Ferro

## 2021-05-14 NOTE — DIETITIAN INITIAL EVALUATION ADULT. - ENTERAL
If PO intake is not appropriate, recommend continuous enteral nutrition feeding of Jevity 1.5 at 50mL/hr, which will provide 1,200mL total volume, 1,800 calories, 77 g protein, 912mL water. Recommend free water 40ml/hr to provide an additional 960mL free water.

## 2021-05-14 NOTE — PROVIDER CONTACT NOTE (EICU) - ASSESSMENT
patient hg 17 after 4 units prbc. no active bleeding noted. more likely septic source as cause of low bp

## 2021-05-14 NOTE — PROGRESS NOTE ADULT - SUBJECTIVE AND OBJECTIVE BOX
HPI:   Patient is a 75y female with    REVIEW OF SYSTEMS:  All other review of systems negative (Comprehensive ROS)    PAST MEDICAL & SURGICAL HISTORY:  Bipolar 1 disorder    Skin cancer  Basla cell &amp; squamous cells    Frozen shoulder    Plantar fasciitis  left foot    History of skin cancer        Allergies    No Known Allergies    Intolerances        Antimicrobials Day #    piperacillin/tazobactam IVPB.. 3.375 Gram(s) IV Intermittent every 12 hours    Other Medications:  chlorhexidine 0.12% Liquid 15 milliLiter(s) Oral Mucosa every 12 hours  chlorhexidine 4% Liquid 1 Application(s) Topical <User Schedule>  norepinephrine Infusion 0.05 MICROgram(s)/kG/Min IV Continuous <Continuous>  pantoprazole Infusion 8 mG/Hr IV Continuous <Continuous>  phenylephrine    Infusion 0.1 MICROgram(s)/kG/Min IV Continuous <Continuous>  propofol Infusion 5 MICROgram(s)/kG/Min IV Continuous <Continuous>  sodium bicarbonate  Infusion 0.347 mEq/kG/Hr IV Continuous <Continuous>  sodium chloride 0.9% lock flush 10 milliLiter(s) IV Push every 1 hour PRN  sodium chloride 0.9%. 1000 milliLiter(s) IV Continuous <Continuous>      FAMILY HISTORY:  Family history of acute myocardial infarction (Father)        SOCIAL HISTORY:  Smoking:     ETOH:     Drug Use:     Single     T(F): 101.5 (21 @ 08:00), Max: 101.5 (21 @ 08:00)  HR: 139 (21 @ 09:15)  BP: 93/58 (21 @ 09:00)  RR: 27 (21 @ 09:15)  SpO2: 99% (21 @ 09:15)  Wt(kg): --    PHYSICAL EXAM:  General: alert, no acute distress  Eyes:  anicteric, no conjunctival injection, no discharge  Oropharynx: no lesions or injection 	  Neck: supple, without adenopathy  Lungs: clear to auscultation  Heart: regular rate and rhythm; no murmur, rubs or gallops  Abdomen: soft, nondistended, nontender, without mass or organomegaly  Skin: no lesions  Extremities: no clubbing, cyanosis, or edema  Neurologic: alert, oriented, moves all extremities    LAB RESULTS:                        17.1   1.51  )-----------( 153      ( 14 May 2021 03:50 )             50.4         145  |  110<H>  |  60<H>  ----------------------------<  71  5.3   |  21<L>  |  3.25<H>    Ca    9.9      14 May 2021 03:50  Phos  4.3       Mg     2.2         TPro  6.0  /  Alb  2.4<L>  /  TBili  1.6<H>  /  DBili  x   /  AST  159<H>  /  ALT  83<H>  /  AlkPhos  70      LIVER FUNCTIONS - ( 14 May 2021 03:50 )  Alb: 2.4 g/dL / Pro: 6.0 g/dL / ALK PHOS: 70 U/L / ALT: 83 U/L / AST: 159 U/L / GGT: x           Urinalysis Basic - ( 13 May 2021 21:10 )    Color: Yellow / Appearance: Clear / S.010 / pH: x  Gluc: x / Ketone: Negative  / Bili: Negative / Urobili: Negative   Blood: x / Protein: 100 / Nitrite: Negative   Leuk Esterase: Trace / RBC: 5-10 /HPF / WBC 0-2 /HPF   Sq Epi: x / Non Sq Epi: Neg.-Few / Bacteria: Few /HPF        MICROBIOLOGY REVIEWED:    RADIOLOGY REVIEWED:   HPI:   Patient is a 75y female with Bipolar d/o, Afib, presented to ED  after episode of vomiting at home and episode of unresponsive. In ER  patient was  hypotensive and intubated for  airway protection. NGT placed 500cc coffee ground emesis drained. Pt received blood transfusion, now intubated, she was also noted to have leukopenia and fever and now on broad spectrum coverage. She is not able to provide any history at present    REVIEW OF SYSTEMS:  All other review of systems negative (Comprehensive ROS)    PAST MEDICAL & SURGICAL HISTORY:  Bipolar 1 disorder    Skin cancer  Basla cell &amp; squamous cells    Frozen shoulder    Plantar fasciitis  left foot    History of skin cancer        Allergies    No Known Allergies    Intolerances        Antimicrobials Day #    piperacillin/tazobactam IVPB.. 3.375 Gram(s) IV Intermittent every 12 hours    Other Medications:  chlorhexidine 0.12% Liquid 15 milliLiter(s) Oral Mucosa every 12 hours  chlorhexidine 4% Liquid 1 Application(s) Topical <User Schedule>  norepinephrine Infusion 0.05 MICROgram(s)/kG/Min IV Continuous <Continuous>  pantoprazole Infusion 8 mG/Hr IV Continuous <Continuous>  phenylephrine    Infusion 0.1 MICROgram(s)/kG/Min IV Continuous <Continuous>  propofol Infusion 5 MICROgram(s)/kG/Min IV Continuous <Continuous>  sodium bicarbonate  Infusion 0.347 mEq/kG/Hr IV Continuous <Continuous>  sodium chloride 0.9% lock flush 10 milliLiter(s) IV Push every 1 hour PRN  sodium chloride 0.9%. 1000 milliLiter(s) IV Continuous <Continuous>      FAMILY HISTORY:  Family history of acute myocardial infarction (Father)        SOCIAL HISTORY:  Smoking:     ETOH:     Drug Use:     Single     T(F): 101.5 (21 @ 08:00), Max: 101.5 (21 @ 08:00)  HR: 139 (21 @ 09:15)  BP: 93/58 (21 @ 09:00)  RR: 27 (21 @ 09:15)  SpO2: 99% (21 @ 09:15)  Wt(kg): --    PHYSICAL EXAM:  General:intubated and sedated  Eyes:  anicteric, no conjunctival injection, no discharge  Oropharynx: no lesions or injection 	  Neck: supple, without adenopathy  Lungs: clear to auscultation  Heart: regular rate and rhythm; no murmur, rubs or gallops  Abdomen: soft, distended, nontender, without mass or organomegaly  Skin: no lesions  Extremities: no clubbing, cyanosis, or edema  Neurologic: sedated    LAB RESULTS:                        17.1   1.51  )-----------( 153      ( 14 May 2021 03:50 )             50.4     05-14    145  |  110<H>  |  60<H>  ----------------------------<  71  5.3   |  21<L>  |  3.25<H>    Ca    9.9      14 May 2021 03:50  Phos  4.3       Mg     2.2         TPro  6.0  /  Alb  2.4<L>  /  TBili  1.6<H>  /  DBili  x   /  AST  159<H>  /  ALT  83<H>  /  AlkPhos  70      LIVER FUNCTIONS - ( 14 May 2021 03:50 )  Alb: 2.4 g/dL / Pro: 6.0 g/dL / ALK PHOS: 70 U/L / ALT: 83 U/L / AST: 159 U/L / GGT: x           Urinalysis Basic - ( 13 May 2021 21:10 )    Color: Yellow / Appearance: Clear / S.010 / pH: x  Gluc: x / Ketone: Negative  / Bili: Negative / Urobili: Negative   Blood: x / Protein: 100 / Nitrite: Negative   Leuk Esterase: Trace / RBC: 5-10 /HPF / WBC 0-2 /HPF   Sq Epi: x / Non Sq Epi: Neg.-Few / Bacteria: Few /HPF        MICROBIOLOGY REVIEWED:    RADIOLOGY REVIEWED:  `< from: CT Head No Cont (21 @ 19:40) >    Brain CT: Age-appropriate involutional and ischemic gliotic changes without significant change since 2020.    Cervical spine CT: Degenerative changes. No acute fractures or dislocations.    < end of copied text >  < from: Xray Chest 1 View- PORTABLE-Urgent (Xray Chest 1 View- PORTABLE-Urgent .) (21 @ 18:38) >  IMPRESSION:  Status post central line. No pneumothorax noted.    < end of copied text >

## 2021-05-14 NOTE — PROGRESS NOTE ADULT - SUBJECTIVE AND OBJECTIVE BOX
Addendum to H&P/ICU Consult note  - Patient seen and examined today    ICU CONSULT  Location of Patient :  [facility] [id number] [id number] ( [facility])  Attending requesting Consult:Brady Ferro  Chief Complaint :  Vomiting  Reason For consult : Shock      Initial HPI on admission:  HPI:    HPI:  75 year old Female with history  Bipolar, Afib on Eliquis presented to ED tonight after episode of vomiting at home.   Per cousin, patient at home with 24hr HHA who noticed patient to have vomited and unreponsive. Upon arrival to ED patient found to be hypotensive and intubated for poor mental status. NGT placed with ~500cc coffee ground emesis obtained. Code Fusion called due to concern for hemorrhagic shock in setting of GI bleed. Per documentation patient is on Eliquis at home and ASA. Received total 4PRBC, 4 cryoprecipitate, 1u PLT w/ improvement in BP.   Upon arrival to ICU patient awake and following commands. Per cousin Katy patient has not been weak and not eating much at home (13 May 2021 21:55)      BRIEF HOSPITAL COURSE:   At this time patient intubated, sedated, on 2 pressor, tachycardic.       PAST MEDICAL & SURGICAL HISTORY:  Bipolar 1 disorder    Skin cancer  Basla cell &amp; squamous cells    Frozen shoulder    Plantar fasciitis  left foot    History of skin cancer      Allergies    No Known Allergies    Intolerances    Due to current medical status patient unable to provide medical, social, family history.  History obtained from available medical record.     Medications:  MEDICATIONS  (STANDING):  ARIPiprazole 5 milliGRAM(s) Oral daily  chlorhexidine 0.12% Liquid 15 milliLiter(s) Oral Mucosa every 12 hours  chlorhexidine 4% Liquid 1 Application(s) Topical <User Schedule>  mirtazapine 15 milliGRAM(s) Oral at bedtime  norepinephrine Infusion 0.05 MICROgram(s)/kG/Min (2.53 mL/Hr) IV Continuous <Continuous>  pantoprazole Infusion 8 mG/Hr (10 mL/Hr) IV Continuous <Continuous>  phenylephrine    Infusion 0.1 MICROgram(s)/kG/Min (2.03 mL/Hr) IV Continuous <Continuous>  piperacillin/tazobactam IVPB.. 3.375 Gram(s) IV Intermittent every 12 hours  propofol Infusion 5 MICROgram(s)/kG/Min (1.44 mL/Hr) IV Continuous <Continuous>  sodium bicarbonate  Infusion 0.347 mEq/kG/Hr (125 mL/Hr) IV Continuous <Continuous>  sodium chloride 0.9%. 1000 milliLiter(s) (100 mL/Hr) IV Continuous <Continuous>    MEDICATIONS  (PRN):  sodium chloride 0.9% lock flush 10 milliLiter(s) IV Push every 1 hour PRN Pre/post blood products, medications, blood draw, and to maintain line patency      Home Medications:  Last Order Reconciliation Date: 21 (Admission Reconciliation)  aluminum hydroxide-magnesium hydroxide 200 mg-200 mg/5 mL oral suspension: 30 milliliter(s) orally every 6 hours, As needed, Dyspepsia (20)  apixaban 5 mg oral tablet: 1 tab(s) orally 2 times a day (20)  ARIPiprazole 5 mg oral tablet: 1 tab(s) orally once a day (20)  aspirin 81 mg oral delayed release tablet: 1 tab(s) orally once a day (10-06-15)  mirtazapine 15 mg oral tablet: 1 tab(s) orally once a day (at bedtime) (20)  Toprol-XL 25 mg oral tablet, extended release: 1 tab(s) orally once a day (10-06-15)        LABS:                        17.1   1.51  )-----------( 153      ( 14 May 2021 03:50 )             50.4     -    145  |  110<H>  |  60<H>  ----------------------------<  71  5.3   |  21<L>  |  3.25<H>    Ca    9.9      14 May 2021 03:50  Phos  4.3     -  Mg     2.2         TPro  6.0  /  Alb  2.4<L>  /  TBili  1.6<H>  /  DBili  x   /  AST  159<H>  /  ALT  83<H>  /  AlkPhos  70  -      CARDIAC MARKERS ( 13 May 2021 17:50 )  .094 ng/mL / x     / x     / x     / x            PT/INR - ( 14 May 2021 03:50 )   PT: 20.0 sec;   INR: 1.69 ratio         PTT - ( 14 May 2021 03:50 )  PTT:31.4 sec  Urinalysis Basic - ( 13 May 2021 21:10 )    Color: Yellow / Appearance: Clear / S.010 / pH: x  Gluc: x / Ketone: Negative  / Bili: Negative / Urobili: Negative   Blood: x / Protein: 100 / Nitrite: Negative   Leuk Esterase: Trace / RBC: 5-10 /HPF / WBC 0-2 /HPF   Sq Epi: x / Non Sq Epi: Neg.-Few / Bacteria: Few /HPF    COVID  21 @ 17:50  COVID -   NotDetec  20 @ 12:54  COVID -   NotDetec       Trend Cardiac Enzymes  21 @ 17:50  SEN-FSMWT-FIITH-CPKMM/Trop I - -- - --  - --  -  --  /  .094<H>       WBC Trend  21 @ 03:50   -  1.51<L>  21 @ 21:10   -  0.99<LL>  21 @ :50   -  3.20<L>    H/H Trend  21 @ 03:50   -  17.1<H> / 50.4<H>  21 @ 21:10   -  16.2<H> / 49.2<H>  21 @ :50   -  14.6 / 48.5<H>    Platelet Trend  21 @ 03:50   -  153  21 @ 21:10   -  130<L>  21 @ :50   -  226    Trend Sodium  21 @ 03:50   -  145  21 @ 21:10   -  146<H>  21 @ :50   -  145    Trend Potassium  21 @ 03:50   -  5.3  21 @ 21:10   -  4.3  21 @ 17:50   -  4.7    Trend Bun/Cr  21 @ 03:50  BUN/CR -  60<H> / 3.25<H>  21 @ 21:10  BUN/CR -  56<H> / 2.65<H>  21 @ 17:50  BUN/CR -  51<H> / 2.82<H>    Lactic Acid Trend  21 @ 03:50   -   4.9<HH>  21 @ 21:10   -   4.0<HH>    ABG Trend  21 @ 05:30   - 7.31<L>/33/157<H>/98<H>  21 @ 21:16   - 7.36/34/135<H>/98<H>    Trend AST/ALT/ALK Phos/Bili  21 @ 03:50   159<H>/83<H>/70/1.6<H>  21 @ 21:10   96<H>/61<H>/80/1.9<H>  21 @ 17:50   31/14/60/0.5  20 @ 12:49   25//74/0.4       Albumin Trend  21 @ 03:50   -   2.4<L>  21 @ 21:10   -   3.1<L>  21 @ 17:50   -   2.3<L>  20 @ 12:49   -   3.5      PTT - PT - INR Trend  21 @ 03:50   -   31.4 - 20.0<H> - 1.69<H>  20 @ 09:01   -   26.4<L> - -- - --  -20 @ 08:04   -   60.1<H> - -- - --  20 @ 21:10   -   78.2<H> - -- - --  -20 @ 12:38   -   81.6<H> - -- - --  -20 @ 06:06   -   102.0<H> - -- - --    Glucose Trend  21 @ 07:30   -  -- -- 153<H>  21 @ 07:01   -  -- -- 54<LL>  21 @ 06:58   -  -- -- 54<LL>  21 @ 03:50   -  -- 71 --  21 @ 23:24   -  -- -- 85  21 @ 21:10   -  -- 72 --  05-13-21 @ 17:50   -  -- 126<H> --       RADIOLOGY  CXR:      CT:    < from: CT Head No Cont (21 @ 19:40) >    EXAM:  CT CERVICAL SPINE    EXAM:  CT BRAIN      PROCEDURE DATE:  2021        INTERPRETATION:  Clinical Indication: Unresponsive, GI bleed    Brain CT:    5mm axial sections of the brain were obtained from base to vertex, without the intravenous administration of contrast material. Coronal and sagittal computer generated reconstructed views are available.    Comparison is made with the prior CT of 2020 and demonstrates no significant interval change. However    The ventricles and sulci are prominent consistent age appropriate involutional changes. Small vessel white matter ischemic changes are noted. There is no hemorrhage, mass, or shift of midline structures. Bone window examination is unremarkable.      Cervical spine CT:    Serial thin sections on a multi slice scanner were obtained through the cervical spine from the C1 to the T1-2 level in a stacked axial fashion reformatted at 1.25 mm sections with sagittal and coronal computer generated reconstructed views.    No prior spine imaging is available for comparison.    The cervical vertebrae are normal in height and density. There is straightening of the normal cervical lordosis. There is disc space narrowing at C5-6 and C6-7. Mild degenerative anterolisthesis is present at C3-4. An endotracheal and nasogastric tube are in place. There is a left internal jugular venous catheter.        IMPRESSION:    Brain CT: Age-appropriate involutional and ischemic gliotic changes without significant change since 2020.    Cervical spine CT: Degenerative changes. No acute fractures or dislocations.      < end of copied text >      ******PRELIMINARY REPORT******  ******PRELIMINARY REPORT******    EXAM: CT ABDOMEN AND PELVIS    PROCEDURE DATE: 2021  ******PRELIMINARY REPORT******  ******PRELIMINARY REPORT******      INTERPRETATION: [facility] RADIOLOGIST PRELIMINARY REPORT    PROCEDURE INFORMATION:  Exam: CT Abdomen And Pelvis Without Contrast  Exam date and time: 2021 7:25 PM  Age: 75 years old  Clinical indication: Other: Gi bleed    TECHNIQUE:  Imaging protocol: Computed tomography of the abdomen and pelvis without  contrast.    COMPARISON:  No relevant prior studies available.    FINDINGS:  Tubes, catheters and devices: Lower aspect nasogastric tube in distended  stomach.    Pleural spaces: Atelectatic changes both lung bases right greater than left  with small bilateral pleural effusions. Mild emphysematous changes with bullous  formation.  Heart: Cardiac calcification.    Liver: There is a 4.2 cm low-attenuation lesion left lobe of liver may  represent cyst could be sonographically assessed.  Gallbladder and bile ducts: No obvious biliary dilatation.  Pancreas: Not well visualized due to absence of contrast and paucity of fat  could be further evaluated with dedicated sonography or contrast-enhanced  study..  Spleen: Spleen appears grossly unremarkable.  Adrenal glands: I cannot exclude slight enlargement of adrenal glands left  greater than right. Detail limited due to absence of contrast and motion  artifact could be further evaluated with dedicated imaging.  Kidneys and ureters: Approximately 2-3 mm nonobstructing calculi upper and  lower pole left kidney and central portion right kidney as well as hypo dense  lesions could be further evaluated with sonography. No hydronephrosis.  Calcifications in pelvis likely non ureteral.  Stomach and bowel: There is fecal material throughout the colon compatible with  impaction. Marked fecal impaction in rectum with rectal wall thickening and  perirectal infiltration of the mesentery and presacral fluid compatible with  possible stercoral proctitis. No evidence of bowel obstruction or  pneumoperitoneum. Cannot exclude colonic wall thickening particularly involving  the descending colon and rectosigmoid possibly due to colitis. See coronal  image 32 series 602.  Appendix: No evidence of appendicitis.    Intraperitoneal space: There is no evidence of pneumoperitoneum. There is  diffuse ascites throughout the abdomen extending into the pelvis.  Vasculature: The vascular calcification without significant aneurysm abdominal  aorta.  Lymph nodes: Unremarkable. No enlarged lymph nodes.  Urinary bladder: Unremarkable as visualized.  Reproductive: Small calcification central pelvis may relate to uterus could be  further evaluated with pelvic sonography.  Bones/joints: Advanced degenerative changes lumbar spine.  Soft tissues: Some anasarca noted involving the soft tissue of surrounding the  abdomen and pelvis.    Other findings: Study limited due to absence of contrast.    IMPRESSION:  1. Study is limited due to absence of contrast.  2. There is a nasogastric tube with its tip in the fluid distended stomach.  3. There is fecal material throughout the colon compatible with impaction.  Marked fecal impaction in rectum with rectal wall thickening and perirectal  infiltration of the mesentery and presacral fluid compatible with possible  stercoral proctitis. Cannot exclude colonic wall thickening particularly  involving the descending colon and rectosigmoid possibly due to colitis. See  coronal image 32 series 602 No evidence of bowel obstruction or  pneumoperitoneum.  4. There is a 4.2 cm low-attenuation lesion left lobe of liver may represent  cyst could be sonographically assessed.  5. There is diffuse ascites throughout the abdomen extending into the pelvis.  There is associated anasarca of the surrounding soft tissue of the abdomen and  pelvis.  6. Other incidental findings as above.          ******PRELIMINARY REPORT******  ******PRELIMINARY REPORT******      CARLOS MUHAMMAD M.D.;[facility] RADIOLOGIST    ECHO:      VITALS:  T(C): 38.4 (21 @ 04:00), Max: 38.4 (21 04:00)  T(F): 101.2 (21 04:00), Max: 101.2 (21 04:00)  HR: 140 (21 07:00) (115 - 157)  BP: 83/58 (21 07:00) (36/18 - 142/84)  BP(mean): 68 (21 07:00) (25 - 97)  ABP: --  ABP(mean): --  RR: 28 (21 07:00) (20 - 34)  SpO2: 98% (21 07:00) (90% - 100%)  CVP(mm Hg): --  CVP(cm H2O): --    Ins and Outs     21 @ 07:01  -  21 @ 07:00  --------------------------------------------------------  IN: 4058.6 mL / OUT: 1150 mL / NET: 2908.6 mL        Height (cm): 165.1 (21 @ 17:31)  Weight (kg): 54 (21 @ 19:48)  BMI (kg/m2): 19.8 (21 19:48)    Device: Avea, Mode: AC/ CMV (Assist Control/ Continuous Mandatory Ventilation), RR (machine): 20, RR (patient): 24, TV (machine): 400, TV (patient): 467, FiO2: 50, PEEP: 5, MAP: 11, PIP: 25    I&O's Detail    13 May 2021 07:01  -  14 May 2021 07:00  --------------------------------------------------------  IN:    IV PiggyBack: 100 mL    Lactated Ringers: 200 mL    Lactated Ringers Bolus: 2000 mL    Norepinephrine: 24.3 mL    Pantoprazole: 90 mL    Phenylephrine: 15.2 mL    Propofol: 29.2 mL    Sodium Bicarbonate: 300 mL    sodium chloride 0.9%: 1300 mL  Total IN: 4058.6 mL    OUT:    Indwelling Catheter - Urethral (mL): 250 mL    Nasogastric/Oral tube (mL): 900 mL  Total OUT: 1150 mL    Total NET: 2908.6 mL        Addendum to H&P/ICU Consult note  - Patient seen and examined today    ICU CONSULT  Location of Patient :  [facility]1 [id number] [id number] ( [facility])  Attending requesting Consult:Brady Ferro  Chief Complaint :  Vomiting  Reason For consult : Shock      Initial HPI on admission:  HPI:    HPI:  75 year old Female with history  Bipolar, Afib on Eliquis presented to ED tonight after episode of vomiting at home.   Per cousin, patient at home with 24hr HHA who noticed patient to have vomited and unreponsive. Upon arrival to ED patient found to be hypotensive and intubated for poor mental status. NGT placed with ~500cc coffee ground emesis obtained. Code Fusion called due to concern for hemorrhagic shock in setting of GI bleed. Per documentation patient is on Eliquis at home and ASA. Received total 4PRBC, 4 cryoprecipitate, 1u PLT w/ improvement in BP.   Upon arrival to ICU patient awake and following commands. Per cousin Katy patient has not been weak and not eating much at home (13 May 2021 21:55)      BRIEF HOSPITAL COURSE:   overnight patient became more hypotensive started on two pressors  propofol tapered off  patient eyes close, not following commands    At this time patient intubated, sedated, on 2 pressor, tachycardic.       PAST MEDICAL & SURGICAL HISTORY:  Bipolar 1 disorder    Skin cancer  Basla cell &amp; squamous cells    Frozen shoulder    Plantar fasciitis  left foot    History of skin cancer      Allergies    No Known Allergies    Intolerances    Due to current medical status patient unable to provide medical, social, family history.  History obtained from available medical record.     Medications:  MEDICATIONS  (STANDING):  ARIPiprazole 5 milliGRAM(s) Oral daily  chlorhexidine 0.12% Liquid 15 milliLiter(s) Oral Mucosa every 12 hours  chlorhexidine 4% Liquid 1 Application(s) Topical <User Schedule>  mirtazapine 15 milliGRAM(s) Oral at bedtime  norepinephrine Infusion 0.05 MICROgram(s)/kG/Min (2.53 mL/Hr) IV Continuous <Continuous>  pantoprazole Infusion 8 mG/Hr (10 mL/Hr) IV Continuous <Continuous>  phenylephrine    Infusion 0.1 MICROgram(s)/kG/Min (2.03 mL/Hr) IV Continuous <Continuous>  piperacillin/tazobactam IVPB.. 3.375 Gram(s) IV Intermittent every 12 hours  propofol Infusion 5 MICROgram(s)/kG/Min (1.44 mL/Hr) IV Continuous <Continuous>  sodium bicarbonate  Infusion 0.347 mEq/kG/Hr (125 mL/Hr) IV Continuous <Continuous>  sodium chloride 0.9%. 1000 milliLiter(s) (100 mL/Hr) IV Continuous <Continuous>    MEDICATIONS  (PRN):  sodium chloride 0.9% lock flush 10 milliLiter(s) IV Push every 1 hour PRN Pre/post blood products, medications, blood draw, and to maintain line patency      Home Medications:  Last Order Reconciliation Date: 21 (Admission Reconciliation)  aluminum hydroxide-magnesium hydroxide 200 mg-200 mg/5 mL oral suspension: 30 milliliter(s) orally every 6 hours, As needed, Dyspepsia (20)  apixaban 5 mg oral tablet: 1 tab(s) orally 2 times a day (20)  ARIPiprazole 5 mg oral tablet: 1 tab(s) orally once a day (20)  aspirin 81 mg oral delayed release tablet: 1 tab(s) orally once a day (10-06-15)  mirtazapine 15 mg oral tablet: 1 tab(s) orally once a day (at bedtime) (20)  Toprol-XL 25 mg oral tablet, extended release: 1 tab(s) orally once a day (10-06-15)        LABS:                        17.1   1.51  )-----------( 153      ( 14 May 2021 03:50 )             50.4     -    145  |  110<H>  |  60<H>  ----------------------------<  71  5.3   |  21<L>  |  3.25<H>    Ca    9.9      14 May 2021 03:50  Phos  4.3       Mg     2.2         TPro  6.0  /  Alb  2.4<L>  /  TBili  1.6<H>  /  DBili  x   /  AST  159<H>  /  ALT  83<H>  /  AlkPhos  70  -14      CARDIAC MARKERS ( 13 May 2021 17:50 )  .094 ng/mL / x     / x     / x     / x            PT/INR - ( 14 May 2021 03:50 )   PT: 20.0 sec;   INR: 1.69 ratio         PTT - ( 14 May 2021 03:50 )  PTT:31.4 sec  Urinalysis Basic - ( 13 May 2021 21:10 )    Color: Yellow / Appearance: Clear / S.010 / pH: x  Gluc: x / Ketone: Negative  / Bili: Negative / Urobili: Negative   Blood: x / Protein: 100 / Nitrite: Negative   Leuk Esterase: Trace / RBC: 5-10 /HPF / WBC 0-2 /HPF   Sq Epi: x / Non Sq Epi: Neg.-Few / Bacteria: Few /HPF    COVID  21 @ 17:50  COVID -   NotDetec  20 @ 12:54  COVID -   NotDetec       Trend Cardiac Enzymes  21 @ 17:50  WGD-WOLPX-BGKDM-CPKMM/Trop I - -- - --  - --  -  --  /  .094<H>       WBC Trend  21 @ 03:50   -  1.51<L>  21 @ 21:10   -  0.99<LL>  21 @ 17:50   -  3.20<L>    H/H Trend  21 @ 03:50   -  17.1<H> / 50.4<H>  21 @ 21:10   -  16.2<H> / 49.2<H>  21 @ 17:50   -  14.6 / 48.5<H>    Platelet Trend  21 @ 03:50   -  153  21 @ 21:10   -  130<L>  21 @ 17:50   -  226    Trend Sodium  21 @ 03:50   -  145  21 @ 21:10   -  146<H>  21 @ 17:50   -  145    Trend Potassium  21 @ 03:50   -  5.3  21 @ 21:10   -  4.3  21 @ 17:50   -  4.7    Trend Bun/Cr  21 @ 03:50  BUN/CR -  60<H> / 3.25<H>  21 @ 21:10  BUN/CR -  56<H> / 2.65<H>  21 @ 17:50  BUN/CR -  51<H> / 2.82<H>    Lactic Acid Trend  21 @ 03:50   -   4.9<HH>  21 @ 21:10   -   4.0<HH>    ABG Trend  21 @ 05:30   - 7.31<L>/33/157<H>/98<H>  21 @ 21:16   - 7.36/34/135<H>/98<H>    Trend AST/ALT/ALK Phos/Bili  21 @ 03:50   159<H>/83<H>/70/1.6<H>  21 @ 21:10   96<H>/61<H>/80/1.9<H>  21 @ 17:50   31/14/60/0.5  0920 @ 12:49   25//74/0.4       Albumin Trend  21 @ 03:50   -   2.4<L>  21 @ 21:10   -   3.1<L>  21 @ 17:50   -   2.3<L>  20 @ 12:49   -   3.5      PTT - PT - INR Trend  21 @ 03:50   -   31.4 - 20.0<H> - 1.69<H>  20 @ 09:01   -   26.4<L> - -- - --  -20 @ 08:04   -   60.1<H> - -- - --  - @ 21:10   -   78.2<H> - -- - --  -20 @ 12:38   -   81.6<H> - -- - --  -20 @ 06:06   -   102.0<H> - -- - --    Glucose Trend  21 @ 07:30   -  -- -- 153<H>  21 @ 07:01   -  -- -- 54<LL>  21 @ 06:58   -  -- -- 54<LL>  21 @ 03:50   -  -- 71 --  21 @ 23:24   -  -- -- 85  21 @ 21:10   -  -- 72 --  21 @ 17:50   -  -- 126<H> --       RADIOLOGY  CXR:      CT:    < from: CT Head No Cont (21 @ 19:40) >    EXAM:  CT CERVICAL SPINE    EXAM:  CT BRAIN      PROCEDURE DATE:  2021        INTERPRETATION:  Clinical Indication: Unresponsive, GI bleed    Brain CT:    5mm axial sections of the brain were obtained from base to vertex, without the intravenous administration of contrast material. Coronal and sagittal computer generated reconstructed views are available.    Comparison is made with the prior CT of 2020 and demonstrates no significant interval change. However    The ventricles and sulci are prominent consistent age appropriate involutional changes. Small vessel white matter ischemic changes are noted. There is no hemorrhage, mass, or shift of midline structures. Bone window examination is unremarkable.      Cervical spine CT:    Serial thin sections on a multi slice scanner were obtained through the cervical spine from the C1 to the T1-2 level in a stacked axial fashion reformatted at 1.25 mm sections with sagittal and coronal computer generated reconstructed views.    No prior spine imaging is available for comparison.    The cervical vertebrae are normal in height and density. There is straightening of the normal cervical lordosis. There is disc space narrowing at C5-6 and C6-7. Mild degenerative anterolisthesis is present at C3-4. An endotracheal and nasogastric tube are in place. There is a left internal jugular venous catheter.        IMPRESSION:    Brain CT: Age-appropriate involutional and ischemic gliotic changes without significant change since 2020.    Cervical spine CT: Degenerative changes. No acute fractures or dislocations.      < end of copied text >      ******PRELIMINARY REPORT******  ******PRELIMINARY REPORT******    EXAM: CT ABDOMEN AND PELVIS    PROCEDURE DATE: 2021  ******PRELIMINARY REPORT******  ******PRELIMINARY REPORT******      INTERPRETATION: [facility] RADIOLOGIST PRELIMINARY REPORT    PROCEDURE INFORMATION:  Exam: CT Abdomen And Pelvis Without Contrast  Exam date and time: 2021 7:25 PM  Age: 75 years old  Clinical indication: Other: Gi bleed    TECHNIQUE:  Imaging protocol: Computed tomography of the abdomen and pelvis without  contrast.    COMPARISON:  No relevant prior studies available.    FINDINGS:  Tubes, catheters and devices: Lower aspect nasogastric tube in distended  stomach.    Pleural spaces: Atelectatic changes both lung bases right greater than left  with small bilateral pleural effusions. Mild emphysematous changes with bullous  formation.  Heart: Cardiac calcification.    Liver: There is a 4.2 cm low-attenuation lesion left lobe of liver may represent cyst could be sonographically assessed.   Gallbladder and bile ducts: No obvious biliary dilatation.  Pancreas: Not well visualized due to absence of contrast and paucity of fat could be further evaluated with dedicated sonography or contrast-enhanced study..  Spleen: Spleen appears grossly unremarkable.  Adrenal glands: I cannot exclude slight enlargement of adrenal glands left greater than right. Detail limited due to absence of contrast and motion artifact could be further evaluated with dedicated imaging.  Kidneys and ureters: Approximately 2-3 mm nonobstructing calculi upper and lower pole left kidney and central portion right kidney as well as hypo dense lesions could be further evaluated with sonography. No hydronephrosis.  Calcifications in pelvis likely non ureteral.  Stomach and bowel: There is fecal material throughout the colon compatible with impaction. Marked fecal impaction in rectum with rectal wall thickening and  perirectal infiltration of the mesentery and presacral fluid compatible with possible stercoral proctitis. No evidence of bowel obstruction or pneumoperitoneum. Cannot exclude colonic wall thickening particularly involving the descending colon and rectosigmoid possibly due to colitis. See coronal image 32 series 602.  Appendix: No evidence of appendicitis.    Intraperitoneal space: There is no evidence of pneumoperitoneum. There is diffuse ascites throughout the abdomen extending into the pelvis. Vasculature: The vascular calcification without significant aneurysm abdominal aorta.  Lymph nodes: Unremarkable. No enlarged lymph nodes.   Urinary bladder: Unremarkable as visualized.  Reproductive: Small calcification central pelvis may relate to uterus could be further evaluated with pelvic sonography.  Bones/joints: Advanced degenerative changes lumbar spine.  Soft tissues: Some anasarca noted involving the soft tissue of surrounding the abdomen and pelvis.    Other findings: Study limited due to absence of contrast.    IMPRESSION:  1. Study is limited due to absence of contrast.  2. There is a nasogastric tube with its tip in the fluid distended stomach.  3. There is fecal material throughout the colon compatible with impaction.  Marked fecal impaction in rectum with rectal wall thickening and perirectal infiltration of the mesentery and presacral fluid compatible with possible  stercoral proctitis. Cannot exclude colonic wall thickening particularly involving the descending colon and rectosigmoid possibly due to colitis. See  coronal image 32 series 602 No evidence of bowel obstruction or pneumoperitoneum.  4. There is a 4.2 cm low-attenuation lesion left lobe of liver may represent cyst could be sonographically assessed.  5. There is diffuse ascites throughout the abdomen extending into the pelvis. There is associated anasarca of the surrounding soft tissue of the abdomen and pelvis.  6. Other incidental findings as above.          ******PRELIMINARY REPORT******  ******PRELIMINARY REPORT******      CARLOS MUHAMMAD M.D.;[facility] RADIOLOGIST    ECHO:      VITALS:  T(C): 38.4 (21 @ 04:00), Max: 38.4 (21 @ 04:00)  T(F): 101.2 (21 @ 04:00), Max: 101.2 (21 04:00)  HR: 140 (21 07:00) (115 - 157)  BP: 83/58 (21 07:00) (36/18 - 142/84)  BP(mean): 68 (21 @ 07:00) (25 - 97)  ABP: --  ABP(mean): --  RR: 28 (21 @ 07:00) (20 - 34)  SpO2: 98% (21 07:00) (90% - 100%)  CVP(mm Hg): --  CVP(cm H2O): --    Ins and Outs     21 @ 07:01  -  21 @ 07:00  --------------------------------------------------------  IN: 4058.6 mL / OUT: 1150 mL / NET: 2908.6 mL        Height (cm): 165.1 (21 @ 17:31)  Weight (kg): 54 (21 @ 19:48)  BMI (kg/m2): 19.8 (21 @ 19:48)    Device: Avea, Mode: AC/ CMV (Assist Control/ Continuous Mandatory Ventilation), RR (machine): 20, RR (patient): 24, TV (machine): 400, TV (patient): 467, FiO2: 50, PEEP: 5, MAP: 11, PIP: 25    I&O's Detail    13 May 2021 07:01  -  14 May 2021 07:00  --------------------------------------------------------  IN:    IV PiggyBack: 100 mL    Lactated Ringers: 200 mL    Lactated Ringers Bolus: 2000 mL    Norepinephrine: 24.3 mL    Pantoprazole: 90 mL    Phenylephrine: 15.2 mL    Propofol: 29.2 mL    Sodium Bicarbonate: 300 mL    sodium chloride 0.9%: 1300 mL  Total IN: 4058.6 mL    OUT:    Indwelling Catheter - Urethral (mL): 250 mL    Nasogastric/Oral tube (mL): 900 mL  Total OUT: 1150 mL    Total NET: 2908.6 mL

## 2021-05-14 NOTE — CONSULT NOTE ADULT - SUBJECTIVE AND OBJECTIVE BOX
HPI: 74 yo F from home, pmhx Bipolar, Afib on Eliquis presented to ED  after episode of vomiting at home. Per cousin, patient at home with 24hr HHA who noticed patient to have vomited and unresponsive . Upon arrival to ED patient found to be hypotensive and intubated for poor mental status. NGT placed with ~500cc coffee ground emesis obtained. Code MTP called due to concern for hemorrhagic shock in setting of GI bleed. Per documentation patient is on Eliquis at home and ASA. Received total 4PRBC, 4 cryoprecipitate, 1u PLT w/ improvement in BP.     PAST MEDICAL & SURGICAL HISTORY:  Bipolar 1 disorder    Skin cancer  Basla cell &amp; squamous cells    Frozen shoulder    Plantar fasciitis  left foot    History of skin cancer        SOCIAL HISTORY:    Admitted from:  home  Substance abuse history:              Tobacco hx:                  Alcohol hx:              Home Opioid hx:  Yarsanism:                                    Preferred Language:    Surrogate/HCP/:    Kori             Phone#:  [phone]    FAMILY HISTORY:  Family history of acute myocardial infarction (Father)      Baseline ADLs (prior to admission):    Allergies    No Known Allergies    Intolerances      Present Symptoms:   Dyspnea:   Nausea/Vomiting:   Anxiety:  Depressed   Fatigue:  Loss of appetite:   Pain:                                location:          Review of Systems:  Unable to obtain due to poor mentation/intubated     MEDICATIONS  (STANDING):  chlorhexidine 0.12% Liquid 15 milliLiter(s) Oral Mucosa every 12 hours  chlorhexidine 4% Liquid 1 Application(s) Topical <User Schedule>  gentamicin   IVPB 200 milliGRAM(s) IV Intermittent once  norepinephrine Infusion 0.05 MICROgram(s)/kG/Min (2.53 mL/Hr) IV Continuous <Continuous>  pantoprazole Infusion 8 mG/Hr (10 mL/Hr) IV Continuous <Continuous>  phenylephrine    Infusion 0.1 MICROgram(s)/kG/Min (2.03 mL/Hr) IV Continuous <Continuous>  piperacillin/tazobactam IVPB.. 3.375 Gram(s) IV Intermittent every 12 hours  propofol Infusion 5 MICROgram(s)/kG/Min (1.44 mL/Hr) IV Continuous <Continuous>  sodium bicarbonate  Infusion 0.347 mEq/kG/Hr (125 mL/Hr) IV Continuous <Continuous>  sodium chloride 0.9%. 1000 milliLiter(s) (100 mL/Hr) IV Continuous <Continuous>  vancomycin  IVPB 1000 milliGRAM(s) IV Intermittent once    MEDICATIONS  (PRN):  sodium chloride 0.9% lock flush 10 milliLiter(s) IV Push every 1 hour PRN Pre/post blood products, medications, blood draw, and to maintain line patency      PHYSICAL EXAM:    Vital Signs Last 24 Hrs  T(C): 38.6 (14 May 2021 08:00), Max: 38.6 (14 May 2021 08:00)  T(F): 101.5 (14 May 2021 08:00), Max: 101.5 (14 May 2021 08:00)  HR: 137 (14 May 2021 12:06) (115 - 157)  BP: 112/94 (14 May 2021 11:30) (36/18 - 142/84)  BP(mean): 102 (14 May 2021 11:30) (25 - 102)  RR: 29 (14 May 2021 11:30) (20 - 34)  SpO2: 99% (14 May 2021 12:06) (90% - 100%)    General: lethargic , nonverbal, intubated  Karnofsky Performance Score/Palliative Performance Status Version2:  10   %  PPSV: 10%  HEENT: normal  dry mouth  ET tube   Lungs: intubated, w/coarse bs   CV: tachy   GI: normal/soft    : normal w/ lincoln  Musculoskeletal: normal  w/weakness/flaccid    Skin: color pale, w/d   Neuro: unarousable, non verbal intubated  Oral intake ability: unable/intubated   Diet: [NPO] / ng      LABS:                        17.1   1.51  )-----------( 153      ( 14 May 2021 03:50 )             50.4         145  |  110<H>  |  60<H>  ----------------------------<  71  5.3   |  21<L>  |  3.25<H>    Ca    9.9      14 May 2021 03:50  Phos  4.3       Mg     2.2         TPro  6.0  /  Alb  2.4<L>  /  TBili  1.6<H>  /  DBili  x   /  AST  159<H>  /  ALT  83<H>  /  AlkPhos  70  14    Urinalysis Basic - ( 13 May 2021 21:10 )    Color: Yellow / Appearance: Clear / S.010 / pH: x  Gluc: x / Ketone: Negative  / Bili: Negative / Urobili: Negative   Blood: x / Protein: 100 / Nitrite: Negative   Leuk Esterase: Trace / RBC: 5-10 /HPF / WBC 0-2 /HPF   Sq Epi: x / Non Sq Epi: Neg.-Few / Bacteria: Few /HPF        RADIOLOGY & ADDITIONAL STUDIES: < from: Xray Chest 1 View- PORTABLE-Urgent (Xray Chest 1 View- PORTABLE-Urgent .) (21 @ 18:38) >    EXAM:  XR CHEST PORTABLE URGENT 1V      PROCEDURE DATE:  2021        INTERPRETATION:  Status post central line    A frontal chest film demonstrates a left jugular venous catheter in situ with tip in the SVC. ET tube in situ.    The heart sizeand vascular markings are within normal limits for technique.    No pneumothorax noted. .      IMPRESSION:  Status post central line. No pneumothorax noted.        < from: CT Head No Cont (21 @ 19:40) >    EXAM:  CT CERVICAL SPINE    EXAM:  CT BRAIN      PROCEDURE DATE:  2021        INTERPRETATION:  Clinical Indication: Unresponsive, GI bleed    Brain CT:    5mm axial sections of the brain were obtained from base to vertex, without the intravenous administration of contrast material. Coronal and sagittal computer generated reconstructed views are available.    Comparison is made with the prior CT of 2020 and demonstrates no significant interval change. However    The ventricles and sulci are prominent consistent age appropriate involutional changes. Small vessel white matter ischemic changes are noted. There is no hemorrhage, mass, or shift of midline structures. Bone window examination is unremarkable.      Cervical spine CT:    Serial thin sections on a multi slice scanner were obtained through the cervical spine from the C1 to the T1-2 level in a stacked axial fashion reformatted at 1.25 mm sections with sagittal and coronal computer generated reconstructed views.    No prior spine imaging is available for comparison.    The cervical vertebrae are normal in height and density. There is straightening of the normal cervical lordosis. There is disc space narrowing at C5-6 and C6-7. Mild degenerative anterolisthesis is present at C3-4. An endotracheal and nasogastric tube are in place. There is a left internal jugular venous catheter.        IMPRESSION:    Brain CT: Age-appropriate involutional and ischemic gliotic changes without significant change since 2020.    Cervical spine CT: Degenerative changes. No acute fractures or dislocations.          ADVANCE DIRECTIVES: HCP    Advanced Care Planning discussion total time spent:

## 2021-05-14 NOTE — PROCEDURE NOTE - ADDITIONAL PROCEDURE DETAILS
Patient with septic shock requiring vasopressors  Under US guidance left axillae arterial line placed for hemodynamic monitoring

## 2021-05-14 NOTE — PROGRESS NOTE ADULT - ASSESSMENT
Physical Examination:  GENERAL:               Lethargic, intubated, minimally responsive  HEENT:                    Pupils equal  Symmetric. No JVD, Dry  MM  PULM:                     Bilateral air entry, Clear to auscultation bilaterally, No significant sputum production, No Rales, No Rhonchi, No Wheezing  CVS:                         S1, S2,  + Murmur  ABD:                        firm  distended, tender, hypoactive  bowel sounds,   EXT:                         mild  edema, nontender,  Vascular:                cool Extremities, poor Capillary refill, Poor Distal Pulses  NEURO:                 responsive to pain,   PSYC:                      Calm, No Insight.        Assessment  Shock suspect septic at this time, suspect intra abdominal cause, ? Mesenteric Ischemia  Upper GI bleed suspected s/p Code Fusion in ED, but no gross bleeding noted.     h/h stable    hypovolumic shock less likely   Lactic acidosis due to above  Leuokopenia due to abvoe  worsening LFTS - suspect will progress to shock liver  Underlying Afib on Eliquis    Underlying bipolar disorder, h/o skin cancer,     Plan  Continue mechanical ventilation  Pressors to maintain BP  Monitor Labs  NPO  NGT to suction  Abx  f/u cultures  NPO  Surgical Consult  GI Consult  ID consult  Palliative care consult  Hold Eliquis  hold po meds   f/u offical results of CT abd pelvis    PMD:				                   Notified(Date):  Family Updated: 	Emergency Contact Kori [phone]	                                 Date:  5/14- msg left for call back      Sedation & Analgesia:	as above  Diet/Nutrition:		NPO  GI PPx:			PPI Drip    DVT Ppx:		Venodynes  	RISK                                                          Points  	[ ] Previous VTE                                           	3  	[ ] Thrombophilia                                        	2  	[ ] Lower limb paralysis                              	2   	[ ] Current Cancer                                       	2   	[ ] Immobilization > 24 hrs                        	1  	[ x] ICU/CCU stay > 24 hours                       	1  	[x ] Age > 60                                                   	1  		Total:[ 2]      Activity:		       advance as tolerated          Head of Bed:               35-45deg  Glycemic Control:        n/a    Lines: PIV  CENTRAL LINE: 	[x ] YES [  ] NO	                    LOCATION:   	                       DATE INSERTED:   	                    REMOVE:  [ ] YES [x ] NO    A-LINE:  	                [x ] YES [  ] NO                      LOCATION:   	                       DATE INSERTED: 		            REMOVE:  [ ] YES [ ] NO    VALDES: 		        [x ] YES [ ] NO  		                                       DATE INSERTED:		            REMOVE:  [ ] YES [x ] NO      Restraints may be deemed necessary to prevent removal of life-sustaining devices [  x] YES   [    ]  NO    Disposition: ICU care    Goals of Care: As per history patient was DNR/I in past with out documentation   palliative care called to help ascertain GOC.

## 2021-05-14 NOTE — CONSULT NOTE ADULT - SUBJECTIVE AND OBJECTIVE BOX
Hospitalist note:    raúl is a 75y old  Female who presents with a chief complaint of GI BLeed    HPI:  74 yo F pmhx Bipolar, Afib on Eliquis presented to ED tonight after episode of vomiting at home. Per cousin, patient at home with 24hr HHA who noticed patient to have vomited and unreponsive. Upon arrival to ED patient found to be hypotensive and intubated for poor mental status. NGT placed with ~500cc coffee ground emesis obtained. Code MTP called due to concern for hemorrhagic shock in setting of GI bleed. Per documentation patient is on Eliquis at home and ASA. Received total 4PRBC, 4 cryoprecipitate, 1u PLT w/ improvement in BP.     Upon arrival to ICU patient aw          patient examined in CCU bed 6  unresponsive    on 2 pressors  temp 38  hr 140  bp 83/58    heent-sclera anicteric    abdomen-distended, soft not rigid    extrem-? chronic vasculopath    labs:      Lactate, Blood: 4.9 mmol/L (05.14.21 @ 03:50)   omprehensive Metabolic Panel in AM (05.14.21 @ 03:50)   Sodium, Serum: 145 mmol/L   Potassium, Serum: 5.3 mmol/L   Chloride, Serum: 110 mmol/L   Carbon Dioxide, Serum: 21 mmol/L   Anion Gap, Serum: 14 mmol/L   Blood Urea Nitrogen, Serum: 60 mg/dL   Creatinine, Serum: 3.25 mg/dL   Glucose, Serum: 71 mg/dL   Calcium, Total Serum: 9.9 mg/dL   Protein Total, Serum: 6.0 g/dL   Albumin, Serum: 2.4 g/dL   Bilirubin Total, Serum: 1.6 mg/dL   Alkaline Phosphatase, Serum: 70 U/L   Aspartate Aminotransferase (AST/SGOT): 159 U/L   Alanine Aminotransferase (ALT/SGPT): 83 U/L   Complete Blood Count + Automated Diff in AM (05.14.21 @ 03:50)   WBC Count: 1.51 K/uL   RBC Count: 5.69 M/uL   Hemoglobin: 17.1 g/dL   Hematocrit: 50.4 %   Mean Cell Volume: 88.6 fl   Mean Cell Hemoglobin: 30.1 pg   Mean Cell Hemoglobin Conc: 33.9 gm/dL   Red Cell Distrib Width: 16.8 %   Platelet Count - Automated: 153 K/uL   Auto Neutrophil #: 0.94 K/uL     ct scan(reviewed with dr rojas)  fluid in paracolic area(right)  spot of free air    ? thickened bowel wall

## 2021-05-14 NOTE — DIETITIAN INITIAL EVALUATION ADULT. - ORAL INTAKE PTA/DIET HISTORY
Unable to obtain subjective hx at this time. Pt intubated for poor mental status. Per H&P, pt's cousin reports that she is not eating much at home. Per previous RD note from [facility] on 9/14/20 pt receiving puree diet w/ thin liquids, ensure enlive 3x/day.

## 2021-05-14 NOTE — DIETITIAN INITIAL EVALUATION ADULT. - ADD RECOMMEND
1. Continue NPO diet order, advance diet when medically appropriate. 1. Continue NPO diet order, advance diet when medically appropriate. 2. if PO intake is appropriate, recommend puree diet w/ thin liquids. Diet texture/modifications per SLP recommendations. 3. If PO intake is not appropriate, recommend continuous enteral nutrition feeding of Jevity 1.5 at 50mL/hr, which will provide 1,200mL total volume, 1,800 calories, 77 g protein, 912mL water. Recommend free water 40ml/hr to provide an additional 960mL free water

## 2021-05-14 NOTE — DIETITIAN INITIAL EVALUATION ADULT. - OTHER INFO
Pt is a 75y F w/ PMHx Bipolar, Afib on Eliquis presented to ED tonight after episode of vomiting at home. Per cousin, patient at home with 24hr HHA who noticed patient to have vomited and unreponsive. Upon arrival to ED patient found to be hypotensive and intubated for poor mental status. NGT placed with ~500cc coffee ground emesis obtained. Code MTP called due to concern for hemorrhagic shock in setting of GI bleed.  Pt currently NPO. GI consulted. No food allergies noted per chart review. No BM documented thus far, fecal impaction noted, recommend adding bowel regimen. UBW unknown. Previous RD note from [facility] on 9/24/20, states "poor po intake" and dosing wt of 135.1#. Current wt 119#. No edema noted. Skin ecchymotic. Pt currently receiving sodium chloride, 1000mL; 100mL/hr. Pt receiving propofol yesterday of 1.44mL/hr, which was stopped last night. When diet is advanced, if PO intake is appropriate, recommend puree diet w/ thin liquids. Diet texture/modifications per SLP recommendations. Recommend ensure enlive TID. If PO intake is not appropriate, recommend continuous enteral nutrition feeding of Jevity 1.5 at 50mL/hr, which will provide 1,200mL total volume, 1,800 calories, 77 g protein, 912mL water. Pt is a 75y F w/ PMHx Bipolar, Afib on Eliquis presented to ED tonight after episode of vomiting at home. Per cousin, patient at home with 24hr HHA who noticed patient to have vomited and unreponsive. Upon arrival to ED patient found to be hypotensive and intubated for poor mental status. NGT placed with ~500cc coffee ground emesis obtained. Code MTP called due to concern for hemorrhagic shock in setting of GI bleed.  Pt currently NPO. GI consulted. No food allergies noted per chart review. No BM documented thus far, fecal impaction noted, recommend adding bowel regimen. UBW unknown. Previous RD note from [facility] on 9/24/20, states "poor po intake" and dosing wt of 135.1#. Current wt 119#. No edema noted. Skin ecchymotic. Pt currently receiving sodium chloride, 1000mL; 100mL/hr. Pt receiving propofol yesterday of 1.44mL/hr, which was stopped last night. When diet is advanced, if PO intake is appropriate, recommend puree diet w/ thin liquids. Diet texture/modifications per SLP recommendations. Recommend ensure enlive TID. If PO intake is not appropriate, recommend continuous enteral nutrition feeding of Jevity 1.5 at 50mL/hr, which will provide 1,200mL total volume, 1,800 calories, 77 g protein, 912mL water. Recommend free water 40ml/hr to provide an additional 960mL free water to meet estimated fluid needs. Pt is severely malnourished, muscle depletion and fat loss noted. Will continue to follow/monitor.

## 2021-05-15 NOTE — PROGRESS NOTE ADULT - SUBJECTIVE AND OBJECTIVE BOX
CC: f/u for    Patient reports    REVIEW OF SYSTEMS: no fevers, no chills, no nausea, no vomiting, no abd pain, no resp symptoms  All other review of systems negative (Comprehensive ROS)    Antimicrobials Day #    piperacillin/tazobactam IVPB.. 3.375 Gram(s) IV Intermittent every 12 hours    Other Medications Reviewed    T(F): 99.1 (05-15-21 @ 13:00), Max: 103.1 (21 @ 15:00)  HR: 130 (05-15-21 @ 14:00)  BP: 110/57 (05-15-21 @ 14:00)  RR: 30 (05-15-21 @ 14:00)  SpO2: 95% (05-15-21 @ 14:00)    PHYSICAL EXAM:  General: alert, no acute distress  Eyes:  anicteric, no conjunctival injection, no discharge  Oropharynx: no lesions or injection 	  Neck: supple, without adenopathy  Lungs: clear to auscultation  Heart: regular rate and rhythm; no murmur, rubs or gallops  Abdomen: soft, nondistended, nontender, without mass or organomegaly  Skin: no lesions  Extremities: no clubbing, cyanosis, or edema  Neurologic: alert, oriented, moves all extremities    LAB RESULTS:                        15.3   4.65  )-----------( 59       ( 15 May 2021 05:00 )             46.4     -15    140  |  101  |  64<H>  ----------------------------<  152<H>  4.6   |  21<L>  |  4.10<H>    Ca    8.1<L>      15 May 2021 05:00  Phos  6.1     05-15  Mg     2.2         TPro  5.0<L>  /  Alb  1.6<L>  /  TBili  1.3<H>  /  DBili  x   /  AST  184<H>  /  ALT  159<H>  /  AlkPhos  109  -15    LIVER FUNCTIONS - ( 15 May 2021 05:00 )  Alb: 1.6 g/dL / Pro: 5.0 g/dL / ALK PHOS: 109 U/L / ALT: 159 U/L / AST: 184 U/L / GGT: x           Urinalysis Basic - ( 13 May 2021 21:10 )    Color: Yellow / Appearance: Clear / S.010 / pH: x  Gluc: x / Ketone: Negative  / Bili: Negative / Urobili: Negative   Blood: x / Protein: 100 / Nitrite: Negative   Leuk Esterase: Trace / RBC: 5-10 /HPF / WBC 0-2 /HPF   Sq Epi: x / Non Sq Epi: Neg.-Few / Bacteria: Few /HPF        MICROBIOLOGY REVIEWED:  Culture - Blood (21 @ 22:00)   - Coagulase negative Staphylococcus: Detec   Gram Stain:   Growth in aerobic and anaerobic bottles: Gram Positive Cocci in Clusters   Specimen Source: .Blood Blood-Peripheral   Organism: Blood Culture PCR   Culture Results:   Growth in aerobic and anaerobic bottles: Gram Positive Cocci in Clusters   ***Blood Panel PCR results on this specimen are available   approximately 3 hours after the Gram stain result.***   Gram stain, PCR, and/or culture results may not always   correspond due to difference in methodologies.   RADIOLOGY REVIEWED:  < from: CT Cervical Spine No Cont (21 @ 19:43) >    IMPRESSION:    Brain CT: Age-appropriate involutional and ischemic gliotic changes without significant change since 2020.    Cervical spine CT: Degenerative changes. No acute fractures or dislocations.    < end of copied text >  < from: CT Abdomen and Pelvis No Cont (21 @ 19:43) >  IMPRESSION:    Droplets of free air suggestive of perforated viscus. Moderate ascites.    Mottled air, soft tissue and fluid in the vicinity of the duodenal third portion (2:60), possibly pneumatosis or perforation. Hepatic portal venous gas suggestive of ischemic bowel.    Diffuse small bowel wall thickening.    Bilateral lower lobe infiltrate versus atelectasis. Trace bilateral pleural effusions.    Constipated colon.    A preliminary report was provided by Dr. Octavio Cortez of [facility]. Discrepancies include free air, portal venous gas and possible duodenal perforation.    CRITICAL VALUE: I discussed the major findings in this report with RERE RUTHERFORD  at 2021 1:47 PM with readback. Critical value policy of the hospital was followed. Read back and confirmation of receipt of this communication was  performed. This verbal communication supplements the text report of this document.    Addendum created by Octavio Cortez MD on 2021 10:13:50 PM EDT:  The results of this examination were discussed with RERE Marino at 9:10 p.m.  central standard time 2021.    < end of copied text >     CC: f/u for GNR and staph bacteremia    Patient is in ICU, intubated, critically ill    REVIEW OF SYSTEMS: no fevers, no chills, no nausea, no vomiting, no abd pain, no resp symptoms  All other review of systems negative (Comprehensive ROS)    Antimicrobials Day #    piperacillin/tazobactam IVPB.. 3.375 Gram(s) IV Intermittent every 12 hours    Other Medications Reviewed    T(F): 99.1 (05-15-21 @ 13:00), Max: 103.1 (21 @ 15:00)  HR: 130 (05-15-21 @ 14:00)  BP: 110/57 (05-15-21 @ 14:00)  RR: 30 (05-15-21 @ 14:00)  SpO2: 95% (05-15-21 @ 14:00)    PHYSICAL EXAM:  General:sedated  Eyes:  anicteric, no conjunctival injection, no discharge  Oropharynx: no lesions or injection 	  Neck: supple, without adenopathy  Lungs: clear to auscultation  Heart: regular rate and rhythm; no murmur, rubs or gallops  Abdomen: soft, distended, tender, without mass or organomegaly  Skin: no lesions  Extremities: no clubbing, cyanosis, or edema  Neurologic: sedated    LAB RESULTS:                        15.3   4.65  )-----------( 59       ( 15 May 2021 05:00 )             46.4     -15    140  |  101  |  64<H>  ----------------------------<  152<H>  4.6   |  21<L>  |  4.10<H>    Ca    8.1<L>      15 May 2021 05:00  Phos  6.1     05-15  Mg     2.2         TPro  5.0<L>  /  Alb  1.6<L>  /  TBili  1.3<H>  /  DBili  x   /  AST  184<H>  /  ALT  159<H>  /  AlkPhos  109  15    LIVER FUNCTIONS - ( 15 May 2021 05:00 )  Alb: 1.6 g/dL / Pro: 5.0 g/dL / ALK PHOS: 109 U/L / ALT: 159 U/L / AST: 184 U/L / GGT: x           Urinalysis Basic - ( 13 May 2021 21:10 )    Color: Yellow / Appearance: Clear / S.010 / pH: x  Gluc: x / Ketone: Negative  / Bili: Negative / Urobili: Negative   Blood: x / Protein: 100 / Nitrite: Negative   Leuk Esterase: Trace / RBC: 5-10 /HPF / WBC 0-2 /HPF   Sq Epi: x / Non Sq Epi: Neg.-Few / Bacteria: Few /HPF        MICROBIOLOGY REVIEWED:  Culture - Blood (21 @ 22:00)   - Coagulase negative Staphylococcus: Detec   Gram Stain:   Growth in aerobic and anaerobic bottles: Gram Positive Cocci in Clusters   Specimen Source: .Blood Blood-Peripheral   Organism: Blood Culture PCR   Culture Results:   Growth in aerobic and anaerobic bottles: Gram Positive Cocci in Clusters   ***Blood Panel PCR results on this specimen are available   approximately 3 hours after the Gram stain result.***   Gram stain, PCR, and/or culture results may not always   correspond due to difference in methodologies.   RADIOLOGY REVIEWED:  < from: CT Cervical Spine No Cont (21 @ 19:43) >    IMPRESSION:    Brain CT: Age-appropriate involutional and ischemic gliotic changes without significant change since 2020.    Cervical spine CT: Degenerative changes. No acute fractures or dislocations.    < end of copied text >  < from: CT Abdomen and Pelvis No Cont (21 @ 19:43) >  IMPRESSION:    Droplets of free air suggestive of perforated viscus. Moderate ascites.    Mottled air, soft tissue and fluid in the vicinity of the duodenal third portion (2:60), possibly pneumatosis or perforation. Hepatic portal venous gas suggestive of ischemic bowel.    Diffuse small bowel wall thickening.    Bilateral lower lobe infiltrate versus atelectasis. Trace bilateral pleural effusions.    Constipated colon.    A preliminary report was provided by Dr. Octavio Cortez of [facility]. Discrepancies include free air, portal venous gas and possible duodenal perforation.    CRITICAL VALUE: I discussed the major findings in this report with RERE RUTHERFORD  at 2021 1:47 PM with readback. Critical value policy of the hospital was followed. Read back and confirmation of receipt of this communication was  performed. This verbal communication supplements the text report of this document.    Addendum created by Octavio Cortez MD on 2021 10:13:50 PM EDT:  The results of this examination were discussed with RERE Marino at 9:10 p.m.  central standard time 2021.    < end of copied text >

## 2021-05-15 NOTE — PROGRESS NOTE ADULT - SUBJECTIVE AND OBJECTIVE BOX
Follow-up Critical Care Progress Note  Chief Complaint : Gastrointestinal hemorrhage      patient intubated off sedation  arousable, but weak, does not follow commands  Abd pain presents   off levo, hr better, but has episodes of SVT     Yesterday after official ct results came  had discussion with surgery and patient deemed high risk for surgery   discussed with HCP decision made to DNR, and no surgery.   understood that with or with out surgery patient prognosis now poor and will likely not recover   plan to discuss with us today to see if desire to cap care or have palliative wean.     Allergies :No Known Allergies      PAST MEDICAL & SURGICAL HISTORY:  Bipolar 1 disorder    Skin cancer  Basla cell &amp; squamous cells    Frozen shoulder    Plantar fasciitis  left foot    History of skin cancer        Medications:  MEDICATIONS  (STANDING):  chlorhexidine 0.12% Liquid 15 milliLiter(s) Oral Mucosa every 12 hours  chlorhexidine 4% Liquid 1 Application(s) Topical <User Schedule>  phenylephrine    Infusion 0.1 MICROgram(s)/kG/Min (2.03 mL/Hr) IV Continuous <Continuous>  piperacillin/tazobactam IVPB.. 3.375 Gram(s) IV Intermittent every 12 hours  sodium bicarbonate  Infusion 0.347 mEq/kG/Hr (125 mL/Hr) IV Continuous <Continuous>  vasopressin Infusion 0.04 Unit(s)/Min (2.4 mL/Hr) IV Continuous <Continuous>    MEDICATIONS  (PRN):  sodium chloride 0.9% lock flush 10 milliLiter(s) IV Push every 1 hour PRN Pre/post blood products, medications, blood draw, and to maintain line patency    COVID  21 @ 17:50  COVID -   NotDetec  20 @ 12:54  COVID [facility]     Trend Cardiac Enzymes  21 @ 17:50  [id number]-CPKMM/Trop I - -- - --  - --  -  --  /  .094<H>       WBC Trend  05-15-21 @ 05:00   -  4.65  21 @ 20:00   -  3.82  21 @ 12:30   -  2.71<L>  21 @ 03:50   -  1.51<L>  21 @ 21:10   -  0.99<LL>  21 @ 17:50   -  3.20<L>    H/H Trend  05-15-21 @ 05:00   -  15.3 / 46.4<H>  21 @ 20:00   -  16.8<H> / 53.4<H>  21 @ 12:30   -  15.7<H> / 47.4<H>  21 @ 03:50   -  17.1<H> / 50.4<H>  21 @ 21:10   -  16.2<H> / 49.2<H>  21 @ 17:50   -  14.6 / 48.5<H>    Platelet Trend  05-15-21 @ 05:00   -  59<L>  21 @ 20:00   -  93<L>  21 @ 12:30   -  122<L>  21 @ 03:50   -  153  21 @ 21:10   -  130<L>  21 @ 17:50   -  226    Trend Sodium  05-15-21 @ 05:00   -  140  21 @ 20:00   -  139  21 @ 12:30   -  144  21 @ 03:50   -  145  21 @ 21:10   -  146<H>  21 @ 17:50   -  145    Trend Potassium  05-15-21 @ 05:00   -  4.6  21 @ 20:00   -  5.1  21 @ 12:30   -  4.6  21 @ 03:50   -  5.3  21 @ 21:10   -  4.3  21 @ 17:50   -  4.7    Trend Bun/Cr  05-15-21 @ 05:00  BUN/CR -  64<H> / 4.10<H>  21 @ 20:00  BUN/CR -  64<H> / 3.97<H>  21 @ 12:30  BUN/CR -  58<H> / 3.45<H>  21 @ 03:50  BUN/CR -  60<H> / 3.25<H>  21 @ 21:10  BUN/CR -  56<H> / 2.65<H>  21 @ 17:50  BUN/CR -  51<H> / 2.82<H>    Lactic Acid Trend  05-15-21 @ 05:00   -   10.1<HH>  21 @ 20:00   -   7.8<HH>  21 @ 12:30   -   5.3<HH>  21 @ 03:50   -   4.9<HH>  21 @ 21:10   -   4.0<HH>    ABG Trend  05-15-21 @ 05:35   - 7.28<L>/38/138<H>/99<H>  21 @ 20:00   - 7.27<L>/38/132<H>/99<H>  21 @ 12:00   - 7.26<L>/41/187<H>/98<H>  21 @ 05:30   - 7.31<L>/33/157<H>/98<H>  21 @ 21:16   - 7.36/34/135<H>/98<H>    Trend AST/ALT/ALK Phos/Bili  05-15-21 @ 05:00   184<H>/159<H>/109/1.3<H>  21 @ 20:00   212<H>/155<H>/83/1.0  21 @ 12:30   193<H>/126<H>/62/0.7  21 @ 03:50   159<H>/83<H>/70/1.6<H>  21 @ 21:10   96<H>/61<H>/80/1.9<H>  21 @ 17:50   31/14/60/0.5  20 @ 12:49   25/19/74/0.4     Amylase / Lipase Trend  05-15-21 @ 05:00   -   -- / 67<L>  21 @ 20:00   -   -- / 70<L>  21 @ 12:30   -   -- / 70<L>      Albumin Trend  05-15-21 @ 05:00   -   1.6<L>  21 @ 20:00   -   1.5<L>  21 @ 12:30   -   1.7<L>  21 @ 03:50   -   2.4<L>  21 @ 21:10   -   3.1<L>  21 17:50   -   2.3<L>      PTT - PT - INR Trend  21 @ 03:50   -   31.4 - 20.0<H> - 1.69<H>  20 @ 09:01   -   26.4<L> - -- - --  20 @ 08:04   -   60.1<H> - -- - --  20 @ 21:10   -   78.2<H> - -- - --  20 @ 12:38   -   81.6<H> - -- - --  20 @ 06:06   -   102.0<H> - -- - --    Glucose Trend  05-15-21 @ 06:23   -  -- -- 110<H>  05-15-21 @ 05:00   -  -- 152<H> --  21 @ 23:51   -  -- -- 141<H>  21 @ 20:00   -  -- 158<H> --  21 @ 17:56   -  -- -- 85  21 @ 12:30   -  -- 115<H> 93  21 @ 07:30   -  -- -- 153<H>  21 @ 07:01   -  -- -- 54<LL>  21 @ 06:58   -  -- -- 54<LL>  21 @ 03:50   -  -- 71 --        LABS:                        15.3   4.65  )-----------( 59       ( 15 May 2021 05:00 )             46.4     05-15    140  |  101  |  64<H>  ----------------------------<  152<H>  4.6   |  21<L>  |  4.10<H>    Ca    8.1<L>      15 May 2021 05:00  Phos  6.1     05-15  Mg     2.2         TPro  5.0<L>  /  Alb  1.6<L>  /  TBili  1.3<H>  /  DBili  x   /  AST  184<H>  /  ALT  159<H>  /  AlkPhos  109  05-15      CARDIAC MARKERS ( 13 May 2021 17:50 )  .094 ng/mL / x     / x     / x     / x            PT/INR - ( 14 May 2021 03:50 )   PT: 20.0 sec;   INR: 1.69 ratio         PTT - ( 14 May 2021 03:50 )  PTT:31.4 sec  Urinalysis Basic - ( 13 May 2021 21:10 )    Color: Yellow / Appearance: Clear / S.010 / pH: x  Gluc: x / Ketone: Negative  / Bili: Negative / Urobili: Negative   Blood: x / Protein: 100 / Nitrite: Negative   Leuk Esterase: Trace / RBC: 5-10 /HPF / WBC 0-2 /HPF   Sq Epi: x / Non Sq Epi: Neg.-Few / Bacteria: Few /HPF              CULTURES: (if applicable)    Culture - Blood (collected 21 @ 22:00)  Source: .Blood Blood-Peripheral  Gram Stain (21 @ 17:38):    Growth in aerobic and anaerobic bottles: Gram Positive Cocci in Clusters  Preliminary Report (21 @ 17:38):    Growth in aerobic and anaerobic bottles: Gram Positive Cocci in Clusters    ***Blood Panel PCR results on this specimen are available    approximately 3 hours after the Gram stain result.***    Gram stain, PCR, and/or culture results may not always    correspond due to difference in methodologies.    ************************************************************    This PCR assay was performed by multiplex PCR. This    Assay tests for 66 bacterial and resistance gene targets.    Please refer to the [facility] test directory    at [email or web address] for details.  Organism: Blood Culture PCR (21 @ 20:49)  Organism: Blood Culture PCR (21 @ 20:49)      -  Coagulase negative Staphylococcus: Detec      Method Type: PCR    Culture - Urine (collected 21 @ 21:10)  Source: .Urine Clean Catch (Midstream)  Final Report (05-15-21 @ 01:11):    <10,000 CFU/mL Normal Urogenital Carrie    Culture - Blood (collected 21 @ 21:10)  Source: .Blood Blood-Peripheral  Gram Stain (21 @ 22:55):    Growth in anaerobic bottle: Gram Positive Cocci in Clusters and Gram    Negative Rods    Growth in aerobic bottle: Gram Negative Rods and Gram Positive Cocci in    Clusters  Preliminary Report (21 @ 22:55):    Growth in anaerobic bottle: Gram Positive Cocci in Clusters and Gram    Negative Rods    Growth in aerobic bottle: Gram Negative Rods and Gram Positive Cocci in    Clusters    ***Blood Panel PCR results on this specimen are available    approximately 3 hoursafter the Gram stain result.***    Gram stain, PCR, and/or culture results may not always    correspond due to difference in methodologies.    ************************************************************    This PCR assay was performed by multiplex PCR. This    Assay tests for 66 bacterial and resistance gene targets.    Please refer to the [facility] test directory    at [email or web address] for details.  Organism: Blood Culture PCR (21 @ 22:34)  Organism: Blood Culture PCR (21 @ 22:34)      -  Pseudomonas aeruginosa: Detec      Method Type: PCR          ABG - ( 15 May 2021 05:35 )  pH, Arterial: 7.28  pH, Blood: x     /  pCO2: 38    /  pO2: 138   / HCO3: x     / Base Excess: x     /  SaO2: 99                 RADIOLOGY  CXR:      CT:  < from: CT Abdomen and Pelvis No Cont (21 @ 19:43) >    EXAM:  CT ABDOMEN AND PELVIS      PROCEDURE DATE:  2021        INTERPRETATION:  FINAL REPORT:    CLINICAL INFORMATION: 75 years  Female with GI Bleed.    COMPARISON: None.    CONTRAST/COMPLICATIONS:  IV Contrast: None  Oral Contrast: None  Complications: None    PROCEDURE:  CT of the Abdomen and Pelvis was performed.  Sagittal and coronal reformats were performed.    LIMITATIONS: Evaluation of the solid organs, vascular structures and GI tract is limited without oral and IV contrast. Streak artifact from patient's arms.    FINDINGS:  LOWER CHEST: Bilateral lower lobe consolidation. Trace bilateral pleural effusions. Enteric catheter tip in the stomach.    LIVER: Portal venous gas in the periphery of the left lobe. 3.9 cm left lobe cyst.  BILE DUCTS: Not visualized with certainty. No gross biliary distention.  GALLBLADDER: Not visualized with certainty.  SPLEEN: Within normal limits.  PANCREAS: Difficult visualization. Air droplets anterior to the pancreas.  ADRENALS: Thickening left adrenal gland. Normal right adrenal gland.  KIDNEYS/URETERS: 2 cm right upper pole cyst. Punctate nonobstructing bilateral renal calculi. No hydroureteronephrosis.    BLADDER: Within normal limits.  REPRODUCTIVE ORGANS: Limited visualization of theuterus.    BOWEL: No bowel obstruction. Diffuse small bowel wall thickening. Abnormal duodenal third portion with mottled soft tissue, fluid and air, possibly ischemic bowel or focal perforation. Mild gastric distention with fluid level. Enteric catheter tip in the stomach. Moderate fecal retention throughout the colon. Rectal fecal impaction with distention to 7.8 cm.  PERITONEUM: No ascites. Droplets of free air in the anterior abdomen (2:40).. Free air anterior to the pancreas (2:30). Mottled air, soft tissue and fluid in the vicinity of the duodenal third portion (2:60), possibly pneumatosis or perforation.  VESSELS: Atherosclerotic changes. Intrahepatic portal venous gas.  RETROPERITONEUM/LYMPH NODES: No lymphadenopathy.  ABDOMINAL WALL:Anasarca.  BONES: Degenerative changes.    IMPRESSION:    Droplets of free air suggestive of perforated viscus. Moderate ascites.    Mottled air, soft tissue and fluid in the vicinity of the duodenal third portion (2:60), possibly pneumatosis or perforation. Hepatic portal venous gas suggestive of ischemic bowel.    Diffuse small bowel wall thickening.    Bilateral lower lobe infiltrate versus atelectasis. Trace bilateral pleural effusions.    Constipated colon.    A preliminary report was provided by Dr. Octavio Cortez of [facility]. Discrepancies include free air, portal venous gas and possible duodenal perforation.      < end of copied text >    ECHO:      VITALS:  T(C): 37.5 (05-15-21 @ 08:00), Max: 40.2 (21 @ 14:00)  T(F): 99.5 (05-15-21 @ 08:00), Max: 104.3 (21 @ 14:00)  HR: 127 (05-15-21 @ 09:30) (109 - 143)  BP: 130/89 (05-15-21 @ 09:30) (86/64 - 130/89)  BP(mean): 102 (05-15-21 @ 09:30) (47 - 102)  ABP: 118/61 (05-15-21 @ 09:30) (97/59 - 136/75)  ABP(mean): 86 (05-15-21 @ 09:30) (73 - 100)  RR: 30 (05-15-21 @ 09:30) (22 - 38)  SpO2: 100% (05-15-21 @ 09:30) (94% - 100%)  CVP(mm Hg): --  CVP(cm H2O): --    Ins and Outs     21 @ 07:01  -  05-15-21 @ 07:00  --------------------------------------------------------  IN: 6898.5 mL / OUT: 920 mL / NET: 5978.5 mL    05-15-21 @ 07:01  -  05-15-21 @ 09:57  --------------------------------------------------------  IN: 1430.5 mL / OUT: 50 mL / NET: 1380.5 mL        Height (cm): 165.1 (21 17:31)  Weight (kg): 54 (21 14:30)  BMI (kg/m2): 19.8 (21 14:30)    Device: Avea, Mode: AC/ CMV (Assist Control/ Continuous Mandatory Ventilation), RR (machine): 20, RR (patient): 29, TV (machine): 400, TV (patient): 540, FiO2: 40, PEEP: 5, MAP: 5, PIP: 28    I&O's Detail    14 May 2021 07:01  -  15 May 2021 07:00  --------------------------------------------------------  IN:    IV PiggyBack: 250 mL    IV PiggyBack: 250 mL    IV PiggyBack: 200 mL    Norepinephrine: 1631.7 mL    Pantoprazole: 240 mL    Phenylephrine: 488.4 mL    Sodium Bicarbonate: 3300 mL    sodium chloride 0.9%: 500 mL    Vasopressin: 38.4 mL  Total IN: 6898.5 mL    OUT:    Indwelling Catheter - Urethral (mL): 570 mL    Nasogastric/Oral tube (mL): 350 mL    Propofol: 0 mL  Total OUT: 920 mL    Total NET: 5978.5 mL      15 May 2021 07:01  -  15 May 2021 09:57  --------------------------------------------------------  IN:    Pantoprazole: 30 mL    Phenylephrine: 18.3 mL    Sodium Bicarbonate: 375 mL    Sodium Chloride 0.9% Bolus: 1000 mL    Vasopressin: 7.2 mL  Total IN: 1430.5 mL    OUT:    Indwelling Catheter - Urethral (mL): 50 mL    Norepinephrine: 0 mL    Propofol: 0 mL  Total OUT: 50 mL    Total NET: 1380.5 mL

## 2021-05-15 NOTE — PROGRESS NOTE ADULT - SUBJECTIVE AND OBJECTIVE BOX
on vent    more responsive today      abdomen-distended    labs:    Lactate, Blood: 10.1 mmol/L (05.15.21 @ 05:00)   Complete Blood Count + Automated Diff (05.15.21 @ 05:00)   WBC Count: 4.65 K/uL   RBC Count: 5.21 M/uL   Hemoglobin: 15.3 g/dL   Hematocrit: 46.4 %   Mean Cell Volume: 89.1: Test Repeated. fl   Mean Cell Hemoglobin: 29.4 pg   Mean Cell Hemoglobin Conc: 33.0 gm/dL   Red Cell Distrib Width: 17.1 %   Platelet Count - Automated: 59 K/uL   Auto Neutrophil #: 3.88 K

## 2021-05-15 NOTE — PROGRESS NOTE ADULT - ASSESSMENT
Physical Examination:  GENERAL:               Lethargic/arousable , intubated, minimally responsive  HEENT:                    Pupils equal  Symmetric. No JVD, Dry  MM  PULM:                     Bilateral air entry, Clear to auscultation bilaterally, No significant sputum production, No Rales, No Rhonchi, No Wheezing  CVS:                         S1, S2,  + Murmur  ABD:                        firm  distended, tender, hypoactive  bowel sounds,   EXT:                         mild  edema, nontender,  Vascular:                cool Extremities, poor Capillary refill, Poor Distal Pulses  NEURO:                 opens eyes to touch.   PSYC:                      Calm, No Insight.        Assessment  Septic Shock from intra abdominal cause, wit persistent lactic acidosis Mesenteric Ischemia possible and CT showing duodenal perforation with GNR Pseudomonas Bacteremia     with multi organ Dysfunction  Upper GI bleed suspected s/p Code Fusion in ED, but no gross bleeding noted doubt at this time.   Lactic acidosis due to above WORSENING  Leukopenia due to above  Improving  Thrombocytopenia worsening -due to above   Transaminitis   MAYELIN suspect normal baseline due to above  Underlying Afib on Eliquis    Underlying bipolar disorder, h/o skin cancer,     Plan  Continue mechanical ventilation  Pressors to maintain BP , off levo, hr better  Monitor Labs  NPO, PPI BID  IVF    NGT to suction  Abx as per ID  f/u cultures  NPO  HCP said no surgery  Abx as per ID  GI consult cancelled   Surgery f/u   Palliative care f/u   Hold Eliquis  hold po meds        PMD:				                   Notified(Date):  Family Updated: 	Emergency Contact Kori Jefferson) [phone]	                                 Date:  5/14-as described above  5/15- discussed prognosis is poor and now in multiorgan failure discussed has option for palliative wean from meds,    at this time will continue medical care until that decision is made.    understands when medical interventions maxed out will cap care as patient will not recover from that point.       Sedation & Analgesia:	as above  Diet/Nutrition:		NPO  GI PPx:			PPI BID    DVT Ppx:		Venodynes  	RISK                                                          Points  	[ ] Previous VTE                                           	3  	[ ] Thrombophilia                                        	2  	[ ] Lower limb paralysis                              	2   	[ ] Current Cancer                                       	2   	[ ] Immobilization > 24 hrs                        	1  	[ x] ICU/CCU stay > 24 hours                       	1  	[x ] Age > 60                                                   	1  		Total:[ 2]      Activity:		       advance as tolerated          Head of Bed:               35-45deg  Glycemic Control:        n/a    Lines: PIV  CENTRAL LINE: 	[x ] YES [  ] NO	                    LOCATION:   	                       DATE INSERTED:   	                    REMOVE:  [ ] YES [x ] NO    A-LINE:  	                [x ] YES [  ] NO                      LOCATION:   	                       DATE INSERTED: 		            REMOVE:  [ ] YES [ ] NO    VALDES: 		        [x ] YES [ ] NO  		                                       DATE INSERTED:		            REMOVE:  [ ] YES [x ] NO      Restraints may be deemed necessary to prevent removal of life-sustaining devices [  x] YES   [    ]  NO    Disposition: ICU care    Goals of Care: As per history patient was DNR/I in past with out documentation   palliative care called to help ascertain GOC.

## 2021-05-15 NOTE — PROGRESS NOTE ADULT - SUBJECTIVE AND OBJECTIVE BOX
Intubated in ICU    Vital Signs Last 24 Hrs  T(C): 37.3 (05-15-21 @ 13:00), Max: 38.1 (21 @ 16:30)  T(F): 99.1 (05-15-21 @ 13:00), Max: 100.5 (21 @ 16:30)  HR: 134 (05-15-21 @ 15:10) (109 - 159)  BP: 110/57 (05-15-21 @ 14:00) (90/49 - 130/89)  BP(mean): 66 (05-15-21 @ 14:00) (47 - 102)  RR: 30 (05-15-21 @ 14:00) (22 - 38)  SpO2: 100% (05-15-21 @ 15:10) (94% - 100%)    I&O's Detail    14 May 2021 07:01  -  15 May 2021 07:00  --------------------------------------------------------  IN:    IV PiggyBack: 250 mL    IV PiggyBack: 250 mL    IV PiggyBack: 200 mL    Norepinephrine: 1631.7 mL    Pantoprazole: 240 mL    Phenylephrine: 488.4 mL    Sodium Bicarbonate: 3300 mL    sodium chloride 0.9%: 500 mL    Vasopressin: 38.4 mL  Total IN: 6898.5 mL    OUT:    Indwelling Catheter - Urethral (mL): 570 mL    Nasogastric/Oral tube (mL): 350 mL    Propofol: 0 mL  Total OUT: 920 mL    Total NET: 5978.5 mL    15 May 2021 07:01  -  15 May 2021 15:47  --------------------------------------------------------  IN:    IV PiggyBack: 100 mL    Pantoprazole: 30 mL    Phenylephrine: 44.8 mL    Sodium Bicarbonate: 1000 mL    Sodium Chloride 0.9% Bolus: 1000 mL    Vasopressin: 19.2 mL  Total IN: 2194 mL    OUT:    Indwelling Catheter - Urethral (mL): 165 mL    Norepinephrine: 0 mL    Propofol: 0 mL  Total OUT: 165 mL    Total NET:  mL    Mode: AC/ CMV (Assist Control/ Continuous Mandatory Ventilation)  RR (machine): 20  TV (machine): 400  FiO2: 40  PEEP: 5  MAP: 4  PIP: 26    s1s2  b/l air entry  soft, ND  sm edema                        15.3   4.65  )-----------( 59       ( 15 May 2021 05:00 )             46.4     15 May 2021 05:00    140    |  101    |  64     ----------------------------<  152    4.6     |  21     |  4.10     Ca    8.1        15 May 2021 05:00  Phos  6.1       15 May 2021 05:00  Mg     2.2       14 May 2021 03:50    TPro  5.0    /  Alb  1.6    /  TBili  1.3    /  DBili  x      /  AST  184    /  ALT  159    /  AlkPhos  109    15 May 2021 05:00    LIVER FUNCTIONS - ( 15 May 2021 05:00 )  Alb: 1.6 g/dL / Pro: 5.0 g/dL / ALK PHOS: 109 U/L / ALT: 159 U/L / AST: 184 U/L / GGT: x           PT/INR - ( 14 May 2021 03:50 )   PT: 20.0 sec;   INR: 1.69 ratio       PTT - ( 14 May 2021 03:50 )  PTT:31.4 sec  CAPILLARY BLOOD GLUCOSE    CARDIAC MARKERS ( 13 May 2021 17:50 )  .094 ng/mL / x     / x     / x     / x        Urinalysis Basic - ( 13 May 2021 21:10 )    Color: Yellow / Appearance: Clear / S.010 / pH: x  Gluc: x / Ketone: Negative  / Bili: Negative / Urobili: Negative   Blood: x / Protein: 100 / Nitrite: Negative   Leuk Esterase: Trace / RBC: 5-10 /HPF / WBC 0-2 /HPF   Sq Epi: x / Non Sq Epi: Neg.-Few / Bacteria: Few /HPF    Culture - Blood (collected 13 May 2021 22:00)  Source: .Blood Blood-Peripheral  Gram Stain (14 May 2021 17:38):    Growth in aerobic and anaerobic bottles: Gram Positive Cocci in Clusters  Preliminary Report (14 May 2021 17:38):    Growth in aerobic and anaerobic bottles: Gram Positive Cocci in Clusters    ***Blood Panel PCR results on this specimen are available    approximately 3 hours after the Gram stain result.***    Gram stain, PCR, and/or culture results may not always    correspond due to difference in methodologies.    ************************************************************    This PCR assay was performed by multiplex PCR. This    Assay tests for 66 bacterial and resistance gene targets.    Please refer to the [employer] test directory    at [email or web address] for details.  Organism: Blood Culture PCR (14 May 2021 20:49)  Organism: Blood Culture PCR (14 May 2021 20:49)    Culture - Urine (collected 13 May 2021 21:10)  Source: .Urine Clean Catch (Midstream)  Final Report (15 May 2021 01:11):    <10,000 CFU/mL Normal Urogenital Carrie    Culture - Blood (collected 13 May 2021 21:10)  Source: .Blood Blood-Peripheral  Gram Stain (14 May 2021 22:55):    Growth in anaerobic bottle: Gram Positive Cocci in Clusters and Gram    Negative Rods    Growth in aerobic bottle: Gram Negative Rods and Gram Positive Cocci in    Clusters  Preliminary Report (14 May 2021 22:55):    Growth in anaerobic bottle: Gram Positive Cocci in Clusters and Gram    Negative Rods    Growth in aerobic bottle: Gram Negative Rods and Gram Positive Cocci in    Clusters    ***Blood Panel PCR results on this specimen are available    approximately 3 hoursafter the Gram stain result.***    Gram stain, PCR, and/or culture results may not always    correspond due to difference in methodologies.    ************************************************************    This PCR assay was performed by multiplex PCR. This    Assay tests for 66 bacterial and resistance gene targets.    Please refer to the [employer] test directory    at [email or web address] for details.  Organism: Blood Culture PCR (14 May 2021 22:34)  Organism: Blood Culture PCR (14 May 2021 22:34)      chlorhexidine 0.12% Liquid 15 milliLiter(s) Oral Mucosa every 12 hours  chlorhexidine 4% Liquid 1 Application(s) Topical <User Schedule>  pantoprazole  Injectable 40 milliGRAM(s) IV Push every 12 hours  phenylephrine    Infusion 0.1 MICROgram(s)/kG/Min IV Continuous <Continuous>  piperacillin/tazobactam IVPB.. 3.375 Gram(s) IV Intermittent every 12 hours  sodium bicarbonate  Infusion 0.347 mEq/kG/Hr IV Continuous <Continuous>  sodium chloride 0.9% lock flush 10 milliLiter(s) IV Push every 1 hour PRN  vasopressin Infusion 0.04 Unit(s)/Min IV Continuous <Continuous>    A/P:    Sepsis, shock, bowel ischemia, fever, intubated in ICU  Septic/hemodynamic ATN on CKD 3 (Cr 1.36 - 21)  Abx per ID  Clarke, I/Os  No nephrotoxins  Goal SBP > 90  NPO, IVF w/Bicarb  Prognosis is grave overall  D/w HCP at length at the bedside  Per HCP and according to pts prior known wishes, no surgery, no HD  HCP is considering palliative wean, but needs more time to decide    [phone]

## 2021-05-15 NOTE — PROGRESS NOTE ADULT - ASSESSMENT
Full note to follow 75y female with Bipolar d/o, Afib, presented to ED 5/13 after episode of vomiting at home and episode of unresponsive. In ER  patient was  hypotensive and intubated for  airway protection. NGT placed 500cc coffee ground emesis drained. Pt received blood transfusion, now intubated, she was also noted to have leukopenia and fever and now on broad spectrum coverage. She is not able to provide any history at present  Given fever, neutropenia and GI bleed, suspect possible bowel source, ? neutropenic typhlitis, GNR sepsis  Blood cult now with staph species and pseudomonas  CT findings suggestive of ischemic bowel    PLAN:  Cont empiric Zosyn for now.   s/p Vanc and Gent to cover for possible resistant scott  f/u abd imaging, GI/surgery eval  d/w ICU team  D/w Dr Ferro  d/w family, prognosis is guarded

## 2021-05-15 NOTE — PROGRESS NOTE ADULT - ASSESSMENT
a/p    rising lactate likely consistent with progression of mesenteric ischemia.    Long discussion yesterday with health care proxy Katy([phone]) who expressed patient's wishes and desires re "DNR"/DNI and by extension no surgical intervention.

## 2021-05-16 NOTE — PROGRESS NOTE ADULT - SUBJECTIVE AND OBJECTIVE BOX
CC: f/u for GNR and staph bacteremia    Patient is in ICU, intubated, critically ill    REVIEW OF SYSTEMS: limited  All other review of systems negative (Comprehensive ROS)    Antimicrobials Day #    piperacillin/tazobactam IVPB.. 3.375 Gram(s) IV Intermittent every 12 hours    Other Medications Reviewed    ICU Vital Signs Last 24 Hrs  T(C): 37.1 (16 May 2021 04:00), Max: 37.8 (15 May 2021 17:00)  T(F): 98.8 (16 May 2021 04:00), Max: 100.1 (15 May 2021 17:00)  HR: 133 (16 May 2021 12:00) (111 - 159)  BP: 103/58 (16 May 2021 12:00) (92/60 - 129/70)  BP(mean): 71 (16 May 2021 12:00) (64 - 94)  ABP: 86/52 (16 May 2021 12:00) (83/51 - 116/112)  ABP(mean): 64 (16 May 2021 12:00) (61 - 110)  RR: 20 (16 May 2021 12:00) (20 - 32)  SpO2: 100% (16 May 2021 12:00) (92% - 100%)        PHYSICAL EXAM:  General:sedated  Eyes:  anicteric, no conjunctival injection, no discharge  Oropharynx: no lesions or injection 	  Neck: supple, without adenopathy  Lungs: clear to auscultation  Heart: regular rate and rhythm; no murmur, rubs or gallops  Abdomen: soft, distended, tender, without mass or organomegaly  Skin: no lesions  Extremities: no clubbing, cyanosis, or edema  Neurologic: sedated    LAB RESULTS:                           14.0   8.28  )-----------( 29       ( 16 May 2021 06:20 )             41.0   05-16    140  |  97  |  69<H>  ----------------------------<  145<H>  3.8   |  28  |  3.70<H>    Ca    7.5<L>      16 May 2021 06:20  Phos  5.1     05-16  Mg     2.2     05-16    TPro  4.5<L>  /  Alb  1.3<L>  /  TBili  1.2  /  DBili  x   /  AST  202<H>  /  ALT  212<H>  /  AlkPhos  149<H>  05-16      Urinalysis Basic - ( 13 May 2021 21:10 )    Color: Yellow / Appearance: Clear / S.010 / pH: x  Gluc: x / Ketone: Negative  / Bili: Negative / Urobili: Negative   Blood: x / Protein: 100 / Nitrite: Negative   Leuk Esterase: Trace / RBC: 5-10 /HPF / WBC 0-2 /HPF   Sq Epi: x / Non Sq Epi: Neg.-Few / Bacteria: Few /HPF        MICROBIOLOGY REVIEWED:    Culture - Blood (21 @ 21:10)   Gram Stain:   Growth in anaerobic bottle: Gram Positive Cocci in Clusters and Gram   Negative Rods   Growth in aerobic bottle: Gram Negative Rods and Gram Positive Cocci in   Clusters   - Pseudomonas aeruginosa: Detec   Specimen Source: .Blood Blood-Peripheral   Organism: Blood Culture PCR   Culture Results:   Growth in aerobic and anaerobic bottles: Pseudomonas aeruginosa   Susceptibility to follow.   Growth in anaerobic bottle: Staphylococcus hominis   Growth in anaerobic bottle: Staphylococcus epidermidis   Growth in aerobic bottle: Staphylococcus capitis   Coag Negative Staphylococcus   Single set isolate, possible contaminant. Contact   Microbiology if susceptibility testing clinically   indicated.   ***Blood Panel PCR results on this specimen are available   approximately 3 hours after the Gram stain result.***   Gram stain, PCR, and/or culture results may not always   correspond due to difference in methodolog  RADIOLOGY REVIEWED:  < from: CT Cervical Spine No Cont (21 @ 19:43) >    IMPRESSION:        Brain CT: Age-appropriate involutional and ischemic gliotic changes without significant change since 2020.    Cervical spine CT: Degenerative changes. No acute fractures or dislocations.    < end of copied text >  < from: CT Abdomen and Pelvis No Cont (21 @ 19:43) >  IMPRESSION:    Droplets of free air suggestive of perforated viscus. Moderate ascites.    Mottled air, soft tissue and fluid in the vicinity of the duodenal third portion (2:60), possibly pneumatosis or perforation. Hepatic portal venous gas suggestive of ischemic bowel.    Diffuse small bowel wall thickening.    Bilateral lower lobe infiltrate versus atelectasis. Trace bilateral pleural effusions.    Constipated colon.    A preliminary report was provided by Dr. Octavio Cortez of [facility]. Discrepancies include free air, portal venous gas and possible duodenal perforation.    CRITICAL VALUE: I discussed the major findings in this report with RERE RUTHERFORD  at 2021 1:47 PM with readback. Critical value policy of the hospital was followed. Read back and confirmation of receipt of this communication was  performed. This verbal communication supplements the text report of this document.    Addendum created by Octavio Cortez MD on 2021 10:13:50 PM EDT:  The results of this examination were discussed with RERE Marino at 9:10 p.m.  central standard time 2021.    < end of copied text >

## 2021-05-16 NOTE — PROGRESS NOTE ADULT - SUBJECTIVE AND OBJECTIVE BOX
Follow-up Critical Care Progress Note  Chief Complaint : Gastrointestinal hemorrhage        patient seen and examined  eyes open  to verbal stimuli  but goes to sleep easily  + abd pain.       Allergies :No Known Allergies      PAST MEDICAL & SURGICAL HISTORY:  Bipolar 1 disorder    Skin cancer  Basla cell &amp; squamous cells    Frozen shoulder    Plantar fasciitis  left foot    History of skin cancer        Medications:  MEDICATIONS  (STANDING):  albumin human 25% IVPB 100 milliLiter(s) IV Intermittent once  chlorhexidine 0.12% Liquid 15 milliLiter(s) Oral Mucosa every 12 hours  chlorhexidine 4% Liquid 1 Application(s) Topical <User Schedule>  pantoprazole  Injectable 40 milliGRAM(s) IV Push every 12 hours  phenylephrine    Infusion 0.1 MICROgram(s)/kG/Min (2.03 mL/Hr) IV Continuous <Continuous>  piperacillin/tazobactam IVPB.. 3.375 Gram(s) IV Intermittent every 12 hours  sodium bicarbonate  Infusion 0.347 mEq/kG/Hr (125 mL/Hr) IV Continuous <Continuous>  vasopressin Infusion 0.04 Unit(s)/Min (2.4 mL/Hr) IV Continuous <Continuous>    MEDICATIONS  (PRN):  sodium chloride 0.9% lock flush 10 milliLiter(s) IV Push every 1 hour PRN Pre/post blood products, medications, blood draw, and to maintain line patency    COVID  05-13-21 @ 17:50  COVID -   NotDetec  09-18-20 @ 12:54  COVID -   NotDete       Trend Cardiac Enzymes  05-16-21 @ 06:20  WHO-AFGEK-KQOTS-CPKMM/Trop I - 1079<H> - --  - --  -  --  /  --  05-13-21 @ 17:50  VOG-ONQPL-ZYIUF-CPKMM/Trop I - -- - --  - --  -  --  /  .094<H>    Trend BNP  05-16-21 @ 06:20   -  13136<H>     WBC Trend  05-16-21 @ 06:20   -  8.28  05-15-21 @ 05:00   -  4.65  05-14-21 @ 20:00   -  3.82  05-14-21 @ 12:30   -  2.71<L>  05-14-21 @ 03:50   -  1.51<L>  05-13-21 @ 21:10   -  0.99<LL>    H/H Trend  05-16-21 @ 06:20   -  14.0 / 41.0  05-15-21 @ 05:00   -  15.3 / 46.4<H>  05-14-21 @ 20:00   -  16.8<H> / 53.4<H>  05-14-21 @ 12:30   -  15.7<H> / 47.4<H>  05-14-21 @ 03:50   -  17.1<H> / 50.4<H>  05-13-21 @ 21:10   -  16.2<H> / 49.2<H>    Platelet Trend  05-16-21 @ 06:20   -  29<L>  05-15-21 @ 05:00   -  59<L>  05-14-21 @ 20:00   -  93<L>  05-14-21 @ 12:30   -  122<L>  05-14-21 @ 03:50   -  153  05-13-21 @ 21:10   -  130<L>    Trend Sodium  05-16-21 @ 06:20   -  140  05-15-21 @ 05:00   -  140  05-14-21 @ 20:00   -  139  05-14-21 @ 12:30   -  144  05-14-21 @ 03:50   -  145  05-13-21 @ 21:10   -  146<H>    Trend Potassium  05-16-21 @ 06:20   -  3.8  05-15-21 @ 05:00   -  4.6  05-14-21 @ 20:00   -  5.1  05-14-21 @ 12:30   -  4.6  05-14-21 @ 03:50   -  5.3  05-13-21 @ 21:10   -  4.3    Trend Bun/Cr  05-16-21 @ 06:20  BUN/CR -  69<H> / 3.70<H>  05-15-21 @ 05:00  BUN/CR -  64<H> / 4.10<H>  05-14-21 @ 20:00  BUN/CR -  64<H> / 3.97<H>  05-14-21 @ 12:30  BUN/CR -  58<H> / 3.45<H>  05-14-21 @ 03:50  BUN/CR -  60<H> / 3.25<H>  05-13-21 @ 21:10  BUN/CR -  56<H> / 2.65<H>    Lactic Acid Trend  05-16-21 @ 06:20   -   6.7<HH>  05-15-21 @ 05:00   -   10.1<HH>  05-14-21 @ 20:00   -   7.8<HH>  05-14-21 @ 12:30   -   5.3<HH>  05-14-21 @ 03:50   -   4.9<HH>  05-13-21 @ 21:10   -   4.0<HH>    ABG Trend  05-16-21 @ 05:00   - 7.45/40/133<H>/98<H>  05-15-21 @ 05:35   - 7.28<L>/38/138<H>/99<H>  05-14-21 @ 20:00   - 7.27<L>/38/132<H>/99<H>  05-14-21 @ 12:00   - 7.26<L>/41/187<H>/98<H>  05-14-21 @ 05:30   - 7.31<L>/33/157<H>/98<H>  05-13-21 @ 21:16   - 7.36/34/135<H>/98<H>    Trend AST/ALT/ALK Phos/Bili  05-16-21 @ 06:20   202<H>/212<H>/149<H>/1.2  05-15-21 @ 05:00   184<H>/159<H>/109/1.3<H>  05-14-21 @ 20:00   212<H>/155<H>/83/1.0  05-14-21 @ 12:30   193<H>/126<H>/62/0.7  05-14-21 @ 03:50   159<H>/83<H>/70/1.6<H>  05-13-21 @ 21:10   96<H>/61<H>/80/1.9<H>  05-13-21 @ 17:50   31/14/60/0.5  09-18-20 @ 12:49   25/19/74/0.4        Amylase / Lipase Trend  05-15-21 @ 05:00   -   -- / 67<L>  05-14-21 @ 20:00   -   -- / 70<L>  05-14-21 @ 12:30   -   -- / 70<L>      Albumin Trend  05-16-21 @ 06:20   -   1.3<L>  05-15-21 @ 05:00   -   1.6<L>  05-14-21 @ 20:00   -   1.5<L>  05-14-21 @ 12:30   -   1.7<L>  05-14-21 @ 03:50   -   2.4<L>  05-13-21 @ 21:10   -   3.1<L>      PTT - PT - INR Trend  05-14-21 @ 03:50   -   31.4 - 20.0<H> - 1.69<H>  09-23-20 @ 09:01   -   26.4<L> - -- - --  09-22-20 @ 08:04   -   60.1<H> - -- - --  09-21-20 @ 21:10   -   78.2<H> - -- - --  09-21-20 @ 12:38   -   81.6<H> - -- - --  09-21-20 @ 06:06   -   102.0<H> - -- - --    Glucose Trend  05-16-21 @ 06:20   -  -- 145<H> --  05-15-21 @ 18:08   -  -- -- 96  05-15-21 @ 11:21   -  -- -- 92  05-15-21 @ 06:23   -  -- -- 110<H>  05-15-21 @ 05:00   -  -- 152<H> --  05-14-21 @ 23:51   -  -- -- 141<H>  05-14-21 @ 20:00   -  -- 158<H> --  05-14-21 @ 17:56   -  -- -- 85  05-14-21 @ 12:30   -  -- 115<H> 93  05-14-21 @ 07:30   -  -- -- 153<H>        LABS:                        14.0   8.28  )-----------( 29       ( 16 May 2021 06:20 )             41.0     05-16    140  |  97  |  69<H>  ----------------------------<  145<H>  3.8   |  28  |  3.70<H>    Ca    7.5<L>      16 May 2021 06:20  Phos  5.1     05-16  Mg     2.2     05-16    TPro  4.5<L>  /  Alb  1.3<L>  /  TBili  1.2  /  DBili  x   /  AST  202<H>  /  ALT  212<H>  /  AlkPhos  149<H>  05-16       CULTURES: (if applicable)    Culture - Blood (collected 05-13-21 @ 22:00)  Source: .Blood Blood-Peripheral  Gram Stain (05-14-21 @ 17:38):    Growth in aerobic and anaerobic bottles: Gram Positive Cocci in Clusters  Final Report (05-15-21 @ 17:51):    Growth in aerobic and anaerobic bottles: Staphylococcus simulans    Coag Negative Staphylococcus    Single set isolate, possible contaminant. Contact    Microbiology if susceptibility testing clinically    indicated.    ***Blood Panel PCR results on this specimen are available    approximately 3 hours after the Gram stain result.***    Gram stain, PCR, and/or culture results may not always    correspond due to difference in methodologies.    ************************************************************    This PCR assay was performed by multiplex PCR. This    Assay tests for 66 bacterial and resistance gene targets.    Please refer to the [facility] test directory    at [email or web address] for details.  Organism: Blood Culture PCR (05-15-21 @ 17:51)  Organism: Blood Culture PCR (05-15-21 @ 17:51)      -  Coagulase negative Staphylococcus: Detec      Method Type: PCR    Culture - Urine (collected 05-13-21 @ 21:10)  Source: .Urine Clean Catch (Midstream)  Final Report (05-15-21 @ 01:11):    <10,000 CFU/mL Normal Urogenital Carrie    Culture - Blood (collected 05-13-21 @ 21:10)  Source: .Blood Blood-Peripheral  Gram Stain (05-14-21 @ 22:55):    Growth in anaerobic bottle: Gram Positive Cocci in Clusters and Gram    Negative Rods    Growth in aerobic bottle: Gram Negative Rods and Gram Positive Cocci in    Clusters  Preliminary Report (05-15-21 @ 20:48):    Growth in aerobic and anaerobic bottles: Pseudomonas aeruginosa    Susceptibility to follow.    Growth in anaerobic bottle: Staphylococcus hominis    Growth in anaerobic bottle: Staphylococcus epidermidis    Growth in aerobic bottle: Staphylococcus capitis    Coag Negative Staphylococcus    Single set isolate, possible contaminant. Contact    Microbiology if susceptibility testing clinically    indicated.    ***Blood Panel PCR results on this specimen are available    approximately 3 hours after the Gram stain result.***    Gram stain, PCR, and/or culture results may not always    correspond due to difference in methodologies.    ************************************************************    This PCR assay was performed by multiplex PCR. This    Assay tests for 66 bacterial and resistance gene targets.    Please refer to the [facility] test directory    at [email or web address] for details.  Organism: Blood Culture PCR (05-14-21 @ 22:34)  Organism: Blood Culture PCR (05-14-21 @ 22:34)      -  Pseudomonas aeruginosa: Detec      Method Type: PCR          ABG - ( 16 May 2021 05:00 )  pH, Arterial: 7.45  pH, Blood: x     /  pCO2: 40    /  pO2: 133   / HCO3: x     / Base Excess: x     /  SaO2: 98             RADIOLOGY  CXR:  Bibasilar attelectassis      VITALS:  T(C): 37.1 (05-16-21 @ 04:00), Max: 37.8 (05-15-21 @ 10:00)  T(F): 98.8 (05-16-21 @ 04:00), Max: 100.1 (05-15-21 @ 10:00)  HR: 119 (05-16-21 @ 09:00) (111 - 159)  BP: 113/48 (05-16-21 @ 07:00) (92/60 - 129/70)  BP(mean): 68 (05-16-21 @ 07:00) (64 - 94)  ABP: 97/58 (05-16-21 @ 09:00) (83/51 - 116/112)  ABP(mean): 73 (05-16-21 @ 09:00) (61 - 110)  RR: 29 (05-16-21 @ 09:00) (22 - 32)  SpO2: 96% (05-16-21 @ 09:00) (92% - 100%)  CVP(mm Hg): --  CVP(cm H2O): --    Ins and Outs     05-15-21 @ 07:01  -  05-16-21 @ 07:00  --------------------------------------------------------  IN: 3933 mL / OUT: 570 mL / NET: 3363 mL    05-16-21 @ 07:01  -  05-16-21 @ 09:47  --------------------------------------------------------  IN: 135.5 mL / OUT: 75 mL / NET: 60.5 mL        Height (cm): 165.1 (05-13-21 @ 17:31)  Weight (kg): 54 (05-14-21 @ 14:30)  BMI (kg/m2): 19.8 (05-14-21 @ 14:30)    Device: Avea, Mode: AC/ CMV (Assist Control/ Continuous Mandatory Ventilation), RR (machine): 20, RR (patient): 29, TV (machine): 400, TV (patient): 530, FiO2: 40, PEEP: 5, MAP: 5, PIP: 25    I&O's Detail    15 May 2021 07:01  -  16 May 2021 07:00  --------------------------------------------------------  IN:    IV PiggyBack: 225 mL    Pantoprazole: 30 mL    Phenylephrine: 130 mL    Sodium Bicarbonate: 2500 mL    Sodium Chloride 0.9% Bolus: 1000 mL    Vasopressin: 48 mL  Total IN: 3933 mL    OUT:    Indwelling Catheter - Urethral (mL): 555 mL    Nasogastric/Oral tube (mL): 15 mL    Norepinephrine: 0 mL    Propofol: 0 mL  Total OUT: 570 mL    Total NET: 3363 mL      16 May 2021 07:01  -  16 May 2021 09:47  --------------------------------------------------------  IN:    Phenylephrine: 8.1 mL    Sodium Bicarbonate: 125 mL    Vasopressin: 2.4 mL  Total IN: 135.5 mL    OUT:    Indwelling Catheter - Urethral (mL): 75 mL  Total OUT: 75 mL    Total NET: 60.5 mL

## 2021-05-16 NOTE — PROGRESS NOTE ADULT - ASSESSMENT
PLAN:  -patient will very poor prognosis, as she has perforated duodenum with iscehmic colitis, and will not ahve surgical interventiona t family request  -DNR/DNI  -remains in shock state on two pressors. actively titrating to maitnain MAP 65-70  remains on vent with full support  -Patient currently on Full vent support  -titrate settings to maintain SaO2 >90%, or pH >7.25  -consider low tidal volume ventilation strategy w/ goal Tv 4-6 cc/kg of ideal body weight  -plateu pressure goal <30  -Peridex oral care  -aggressive pulmonary toilet  -daily sedation vacation with spontaneous breathing trial if clinical condition warrants, discuss with respiratory therapy   -platelet coutn 14K tonight, slowly dropping, patient at risk of spotnaneous bleed with further worsening of thrombocytopenia. Given patient with surical issue and famiyl not wanting surgical intervention, offering transfusions of blood/platelets would not change prognosis.  -would discuss transition to comfort care with family, as source control will not be possible without surgical intervention, and therefore all measures currently being employed are temperoizing in nature  -she currently is on fluconazole and zosyn, again without soruce control anbx unlikely to change prognosis.  -per sign out palliaitive care will meet/discuss ongoing care with family tomorrow.  -hold A/C given drop in platelet count  -protonix BID  -started cooling blanket for fevers, goal normothermia,    CRITICAL CARE TIME SPENT: 35 minutes of critical care time spent providing medical care for patient's acute illness/conditions that impairs at least one vital organ system and/or poses a high risk of imminent or life threatening deterioration in the patient's condition. It includes time spent evaluating and treating the patient's acute illness as well as time spent reviewing labs, radiology, discussing goals of care with patient and/or patient's family, and discussing the case with a multidisciplinary team, including the eICU, in an effort to prevent further life threatening deterioration or end organ damage. This time is independent of any procedures performed.

## 2021-05-16 NOTE — PROGRESS NOTE ADULT - SUBJECTIVE AND OBJECTIVE BOX
75F admitted with perforated sigmoid, and worsening ischemic colitis, DNR/DNI, family did not want surgical interventions.   -remains intubated on 2 pressors  -elevated lactate  -worsening thrombocytopenia  -tonight with persistent and worsening fevers (Tmax 103F)     ICU Vital Signs Last 24 Hrs  T(C): 39.7 (16 May 2021 21:00), Max: 39.7 (16 May 2021 21:00)  T(F): 103.4 (16 May 2021 21:00), Max: 103.4 (16 May 2021 21:00)  HR: 121 (16 May 2021 21:12) (111 - 156)  BP: 100/51 (16 May 2021 20:00) (95/55 - 120/54)  BP(mean): 67 (16 May 2021 20:00) (62 - 94)  ABP: 90/53 (16 May 2021 21:00) (83/51 - 116/112)  ABP(mean): 67 (16 May 2021 21:00) (61 - 110)  RR: 26 (16 May 2021 21:00) (20 - 31)  SpO2: 100% (16 May 2021 21:12) (95% - 100%)                              12.2   8.41  )-----------( 14       ( 16 May 2021 20:23 )             34.6         05-16    140  |  98  |  74<H>  ----------------------------<  142<H>  3.2<L>   |  33<H>  |  3.97<H>    Ca    7.3<L>      16 May 2021 20:23  Phos  4.0     05-16  Mg     2.0     05-16    TPro  4.7<L>  /  Alb  1.7<L>  /  TBili  1.7<H>  /  DBili  x   /  AST  177<H>  /  ALT  157<H>  /  AlkPhos  121<H>  05-16

## 2021-05-16 NOTE — PROGRESS NOTE ADULT - ASSESSMENT
75y female with Bipolar d/o, Afib, presented to ED 5/13 after episode of vomiting at home and episode of unresponsive. In ER  patient was  hypotensive and intubated for  airway protection. NGT placed 500cc coffee ground emesis drained. Pt received blood transfusion, now intubated, she was also noted to have leukopenia and fever and now on broad spectrum coverage. She is not able to provide any history at present  Given fever, neutropenia and GI bleed, suspect possible bowel source, ? neutropenic typhlitis, GNR sepsis  Blood cult now with staph species and pseudomonas  CT findings suggestive of ischemic bowel     PLAN:  Cont empiric Zosyn for now.   f/u sens of Pseudomonas  I suspect Coag neg staph -- procurement contaminant, but would redose Vanc by level, given persistent hypotension and pressors requirements  CT reviewed, ? duodenal perforation with duodenitis, will add fluconazole as well to cover small bowel scott yeast colonization  f/u  GI/surgery eval  d/w ICU team  D/w Dr Ferro  d/w family, prognosis is guarded   75y female with Bipolar d/o, Afib, presented to ED 5/13 after episode of vomiting at home and episode of unresponsive. In ER  patient was  hypotensive and intubated for  airway protection. NGT placed 500cc coffee ground emesis drained. Pt received blood transfusion, now intubated, she was also noted to have leukopenia and fever and now on broad spectrum coverage. She is not able to provide any history at present  Given fever, neutropenia and GI bleed, suspect possible bowel source, ? neutropenic typhlitis, GNR sepsis  Blood cult now with staph species and pseudomonas  CT findings suggestive of ischemic bowel     PLAN:  Cont empiric Zosyn for now.   f/u sens of Pseudomonas  I suspect Coag neg staph -- procurement contaminant, but would redose Vanc by level, given persistent hypotension and pressors requirements  CT reviewed, ? duodenal perforation with duodenitis, will add fluconazole as well to cover small bowel scott yeast colonization  f/u  GI/surgery eval  d/w ICU team  D/w Dr Ferro

## 2021-05-16 NOTE — PROGRESS NOTE ADULT - ASSESSMENT
Physical Examination:  GENERAL:               Lethargic/arousable , intubated, minimally responsive  HEENT:                    Pupils equal  Symmetric. No JVD, Dry  MM  PULM:                     Bilateral air entry, Clear to auscultation bilaterally, No significant sputum production, No Rales, No Rhonchi, No Wheezing  CVS:                         S1, S2,  + Murmur  ABD:                        firm  distended, tender, hypoactive  bowel sounds,   EXT:                         mild  edema, nontender,  Vascular:                cool Extremities, poor Capillary refill, Poor Distal Pulses  NEURO:                 opens eyes to touch.   PSYC:                      Calm, No Insight.        Assessment  Septic Shock from intra abdominal cause, wit persistent lactic acidosis Mesenteric Ischemia possible and CT showing duodenal perforation with GNR Pseudomonas Bacteremia     with multi organ Dysfunction  Upper GI bleed suspected s/p Code Fusion in ED, but no gross bleeding noted doubt at this time.   Lactic acidosis due to above WORSENING  Leukopenia due to above  Improving  Thrombocytopenia worsening -due to above   Transaminitis   MAYELIN suspect normal baseline due to above  Underlying Afib on Eliquis    Underlying bipolar disorder, h/o skin cancer,     Plan  Continue mechanical ventilation  Pressors downtrending  afib with rvr will give dig  as not on a/c will try to avoid amio if able    Monitor Labs  NPO, PPI BID  IVF, d/c bicarb drip, change to d5 1/2    NGT to suction  Abx as per ID  f/u cultures    HCP said no surgery  Surgery f/u   Palliative care f/u   Hold Eliquis  hold po meds        PMD:				                   Notified(Date):  Family Updated: 	Emergency Contact Kori Jefferson)  [phone]	                                 Date:  5/16  updated on critical status, some improvement with pressors but over all picture remains poor.  aware can make advanced life decisions.     Sedation & Analgesia:	as above  Diet/Nutrition:		NPO  GI PPx:			PPI BID    DVT Ppx:		Venodynes      Activity:		       advance as tolerated          Head of Bed:               35-45deg  Glycemic Control:        n/a    Lines: PIV  CENTRAL LINE: 	[x ] YES [  ] NO	                    LOCATION:   	                       DATE INSERTED:   	                    REMOVE:  [ ] YES [x ] NO    A-LINE:  	                [x ] YES [  ] NO                      LOCATION:   	                       DATE INSERTED: 		            REMOVE:  [ ] YES [ ] NO    VALDES: 		        [x ] YES [ ] NO  		                                       DATE INSERTED:		            REMOVE:  [ ] YES [x ] NO      Restraints may be deemed necessary to prevent removal of life-sustaining devices [  x] YES   [    ]  NO    Disposition: ICU care    Goals of Care: As per history patient was DNR/I in past with out documentation   palliative care called to help ascertain GOC.

## 2021-05-16 NOTE — PROGRESS NOTE ADULT - SUBJECTIVE AND OBJECTIVE BOX
Intubated in ICU    Vital Signs Last 24 Hrs  T(C): 37.3 (05-16-21 @ 08:00), Max: 37.8 (05-15-21 @ 17:00)  T(F): 99.2 (05-16-21 @ 08:00), Max: 100.1 (05-15-21 @ 17:00)  HR: 133 (05-16-21 @ 12:00) (111 - 156)  BP: 103/58 (05-16-21 @ 12:00) (92/60 - 129/70)  BP(mean): 71 (05-16-21 @ 12:00) (64 - 94)  RR: 20 (05-16-21 @ 12:00) (20 - 32)  SpO2: 100% (05-16-21 @ 12:00) (92% - 100%)    I&O's Detail    15 May 2021 07:01  -  16 May 2021 07:00  --------------------------------------------------------  IN:    IV PiggyBack: 225 mL    Pantoprazole: 30 mL    Phenylephrine: 130 mL    Sodium Bicarbonate: 2500 mL    Sodium Chloride 0.9% Bolus: 1000 mL    Vasopressin: 48 mL  Total IN: 3933 mL    OUT:    Indwelling Catheter - Urethral (mL): 555 mL    Nasogastric/Oral tube (mL): 15 mL    Norepinephrine: 0 mL    Propofol: 0 mL  Total OUT: 570 mL    Total NET: 3363 mL    16 May 2021 07:01  -  16 May 2021 13:58  --------------------------------------------------------  IN:    Phenylephrine: 48.6 mL    Sodium Bicarbonate: 250 mL    Vasopressin: 14.4 mL  Total IN: 313 mL    OUT:    Indwelling Catheter - Urethral (mL): 500 mL  Total OUT: 500 mL    Total NET: -187 mL    Mode: AC/ CMV (Assist Control/ Continuous Mandatory Ventilation)  RR (machine): 20  TV (machine): 400  FiO2: 40  PEEP: 5  MAP: 5  PIP: 23    s1s2  b/l air entry  soft, ND  sm edema                             14.0   8.28  )-----------( 29       ( 16 May 2021 06:20 )             41.0     16 May 2021 06:20    140    |  97     |  69     ----------------------------<  145    3.8     |  28     |  3.70     Ca    7.5        16 May 2021 06:20  Phos  5.1       16 May 2021 06:20  Mg     2.2       16 May 2021 06:20    TPro  4.5    /  Alb  1.3    /  TBili  1.2    /  DBili  x      /  AST  202    /  ALT  212    /  AlkPhos  149    16 May 2021 06:20    LIVER FUNCTIONS - ( 16 May 2021 06:20 )  Alb: 1.3 g/dL / Pro: 4.5 g/dL / ALK PHOS: 149 U/L / ALT: 212 U/L / AST: 202 U/L / GGT: x           CARDIAC MARKERS ( 16 May 2021 06:20 )  x     / x     / 1079 U/L / x     / x          Culture - Blood (collected 13 May 2021 22:00)  Source: .Blood Blood-Peripheral  Gram Stain (14 May 2021 17:38):    Growth in aerobic and anaerobic bottles: Gram Positive Cocci in Clusters  Final Report (15 May 2021 17:51):    Growth in aerobic and anaerobic bottles: Staphylococcus simulans    Coag Negative Staphylococcus    Single set isolate, possible contaminant. Contact    Microbiology if susceptibility testing clinically    indicated.    ***Blood Panel PCR results on this specimen are available    approximately 3 hours after the Gram stain result.***    Gram stain, PCR, and/or culture results may not always    correspond due to difference in methodologies.    ************************************************************    This PCR assay was performed by multiplex PCR. This    Assay tests for 66 bacterial and resistance gene targets.    Please refer to the [employer] test directory    at [email or web address] for details.  Organism: Blood Culture PCR (15 May 2021 17:51)  Organism: Blood Culture PCR (15 May 2021 17:51)    Culture - Urine (collected 13 May 2021 21:10)  Source: .Urine Clean Catch (Midstream)  Final Report (15 May 2021 01:11):    <10,000 CFU/mL Normal Urogenital Carrie    Culture - Blood (collected 13 May 2021 21:10)  Source: .Blood Blood-Peripheral  Gram Stain (14 May 2021 22:55):    Growth in anaerobic bottle: Gram Positive Cocci in Clusters and Gram    Negative Rods    Growth in aerobic bottle: Gram Negative Rods and Gram Positive Cocci in    Clusters  Preliminary Report (15 May 2021 20:48):    Growth in aerobic and anaerobic bottles: Pseudomonas aeruginosa    Susceptibility to follow.    Growth in anaerobic bottle: Staphylococcus hominis    Growth in anaerobic bottle: Staphylococcus epidermidis    Growth in aerobic bottle: Staphylococcus capitis    Coag Negative Staphylococcus    Single set isolate, possible contaminant. Contact    Microbiology if susceptibility testing clinically    indicated.    ***Blood Panel PCR results on this specimen are available    approximately 3 hours after the Gram stain result.***    Gram stain, PCR, and/or culture results may not always    correspond due to difference in methodologies.    ************************************************************    This PCR assay was performed by multiplex PCR. This    Assay tests for 66 bacterial and resistance gene targets.    Please refer to the [facility] [employer] test directory    at [email or web address] for details.  Organism: Blood Culture PCR (14 May 2021 22:34)  Organism: Blood Culture PCR (14 May 2021 22:34)    albumin human 25% IVPB 100 milliLiter(s) IV Intermittent once  albumin human 25% IVPB 50 milliLiter(s) IV Intermittent every 6 hours  chlorhexidine 0.12% Liquid 15 milliLiter(s) Oral Mucosa every 12 hours  chlorhexidine 4% Liquid 1 Application(s) Topical <User Schedule>  dextrose 5% + sodium chloride 0.45%. 1000 milliLiter(s) IV Continuous <Continuous>  fluconAZOLE IVPB      pantoprazole  Injectable 40 milliGRAM(s) IV Push every 12 hours  phenylephrine    Infusion 0.1 MICROgram(s)/kG/Min IV Continuous <Continuous>  piperacillin/tazobactam IVPB.. 3.375 Gram(s) IV Intermittent every 12 hours  sodium chloride 0.9% lock flush 10 milliLiter(s) IV Push every 1 hour PRN  vasopressin Infusion 0.04 Unit(s)/Min IV Continuous <Continuous>    A/P:    Sepsis, shock, bowel ischemia, intubated in ICU  Septic/hemodynamic ATN on CKD 3 (Cr 1.36 - 4/13/21)  Abx per ID  Clarke, I/Os  No nephrotoxins  Goal SBP > 90  NPO, IVF   Cr is better today  No indications for RRT  No HD per HCP if/when necessary  Prognosis is poor overall    [phone]

## 2021-05-17 NOTE — PROVIDER CONTACT NOTE (CRITICAL VALUE NOTIFICATION) - NS PROVIDER READ BACK
yes
no dizziness/no tingling/no weakness/no decreased eating/drinking/no fever/no vomiting/no chills/no numbness/no nausea

## 2021-05-17 NOTE — PROGRESS NOTE ADULT - ASSESSMENT
75y female with Bipolar d/o, Afib, presented to ED 5/13 after episode of vomiting at home and episode of unresponsive. In ER  patient was  hypotensive and intubated for  airway protection. NGT placed 500cc coffee ground emesis drained. Pt received blood transfusion, now intubated, she was also noted to have leukopenia and fever and now on broad spectrum coverage. She is not able to provide any history at present  Given fever, neutropenia and GI bleed, suspect possible bowel source, ? neutropenic typhlitis, GNR sepsis  Blood cult now with staph species and pseudomonas  CT findings suggestive of ischemic bowel     PLAN:  Zosyn for  Pseudomonas bacteremia   I suspect Coag neg staph -- procurement contaminant, but consider redose Vanc by level, given persistent hypotension and pressors requirements  CT reviewed, ? duodenal perforation with duodenitis, fluconazole added as well to cover small bowel scott yeast colonization  f/u  GI/surgery eval  d/w ICU team  D/w Dr George, palliative comfort measures planned, role of abx to be determined, may dc if only palliative/comfort measures planned

## 2021-05-17 NOTE — GOALS OF CARE CONVERSATION - ADVANCED CARE PLANNING - CONVERSATION DETAILS
HCP Gonzalez updated about clinical condition and current MSOF. At this time prognosis is poor. HCP agrees to withdrawal of care and focusing on comfort measures only. DNR/DNI. Will stop vasopressors and mechanical ventilation. No further blood work.
Met with cousin and HCP Katy Huizar today, she was fully updated by ccu team as well as by surgery team today. Pt is currently intubated in ccu. Katy who is pts  designated HCP, stated she was with her cousin for her latest Dr appointments, where she did discuss her wishes with her doctor. Katy feels pt would not want any extraordinary measures taken, and we reviewed CPR, Katy says she knows  pt would want a DNR order in place. We also discussed surgical intervention , as pt just dx with a perf bowel. Katy feels that no surgery would align with pts best known wishes. We then fully reviewed, discussed , and completed a Molst form today to best reflect pts wishes.

## 2021-05-17 NOTE — GOALS OF CARE CONVERSATION - ADVANCED CARE PLANNING - TREATMENT GUIDELINES
DNR Order/Comfort measures only/No blood draws/No artificial nutrition/No antibiotics
DNR Order/Intubation trial/Antibiotic trial/IV fluid trial

## 2021-05-17 NOTE — PROVIDER CONTACT NOTE (CRITICAL VALUE NOTIFICATION) - PERSON GIVING RESULT:
COLLENE Gagnon
MARY SARGENT / STEPHANIE
lab - Fanesha
BJORN GREGORY / [facility]
ISABEL / STEPHANIE
Yonathan from Lab
Gill Lewis, Hematology
alberto; mana

## 2021-05-17 NOTE — PROGRESS NOTE ADULT - SUBJECTIVE AND OBJECTIVE BOX
CC: f/u for GNR and staph bacteremia    Patient is in ICU, intubated, critically ill    REVIEW OF SYSTEMS: limited  All other review of systems negative (Comprehensive ROS)    Antimicrobials Day #        Other Medications Reviewed    ICU Vital Signs Last 24 Hrs  T(C): 37.3 (17 May 2021 06:00), Max: 39.7 (16 May 2021 21:00)  T(F): 99.1 (17 May 2021 06:00), Max: 103.4 (16 May 2021 21:00)  HR: 96 (17 May 2021 09:40) (90 - 138)  BP: 96/73 (17 May 2021 08:00) (93/70 - 114/60)  BP(mean): 82 (17 May 2021 08:00) (62 - 82)  ABP: 101/60 (17 May 2021 09:00) (86/52 - 130/76)  ABP(mean): 77 (17 May 2021 09:00) (64 - 98)  RR: 25 (17 May 2021 09:00) (20 - 28)  SpO2: 100% (17 May 2021 09:40) (92% - 100%)          PHYSICAL EXAM:  General:sedated  Eyes:  anicteric, no conjunctival injection, no discharge  Oropharynx: no lesions or injection 	  Neck: supple, without adenopathy  Lungs: clear to auscultation  Heart: regular rate and rhythm; no murmur, rubs or gallops  Abdomen: soft, distended, tender, without mass or organomegaly  Skin: no lesions  Extremities: no clubbing, cyanosis, or edema  Neurologic: sedated    LAB RESULTS:                                      11.9   8.75  )-----------( 11       ( 17 May 2021 05:00 )             34.5   05-    142  |  98  |  70<H>  ----------------------------<  165<H>  2.7<LL>   |  28  |  3.70<H>    Ca    7.5<L>      17 May 2021 05:00  Phos  4.7     05  Mg     2.0         TPro  4.9<L>  /  Alb  1.8<L>  /  TBili  1.8<H>  /  DBili  x   /  AST  148<H>  /  ALT  144<H>  /  AlkPhos  108          Urinalysis Basic - ( 13 May 2021 21:10 )    Color: Yellow / Appearance: Clear / S.010 / pH: x  Gluc: x / Ketone: Negative  / Bili: Negative / Urobili: Negative   Blood: x / Protein: 100 / Nitrite: Negative   Leuk Esterase: Trace / RBC: 5-10 /HPF / WBC 0-2 /HPF   Sq Epi: x / Non Sq Epi: Neg.-Few / Bacteria: Few /HPF        MICROBIOLOGY REVIEWED:    Culture - Blood (21 @ 21:10)   Gram Stain:   Growth in anaerobic bottle: Gram Positive Cocci in Clusters and Gram   Negative Rods   Growth in aerobic bottle: Gram Negative Rods and Gram Positive Cocci in   Clusters   - Pseudomonas aeruginosa: Detec   Specimen Source: .Blood Blood-Peripheral   Organism: Blood Culture PCR   Culture Results:   Growth in aerobic and anaerobic bottles: Pseudomonas aeruginosa   Susceptibility to follow.   Growth in anaerobic bottle: Staphylococcus hominis   Growth in anaerobic bottle: Staphylococcus epidermidis   Growth in aerobic bottle: Staphylococcus capitis   Coag Negative Staphylococcus   Single set isolate, possible contaminant. Contact   Microbiology if susceptibility testing clinically   indicated.   ***Blood Panel PCR results on this specimen are available   approximately 3 hours after the Gram stain result.***   Gram stain, PCR, and/or culture results may not always   correspond due to difference in methodolog  RADIOLOGY REVIEWED:  < from: CT Cervical Spine No Cont (21 @ 19:43) >    IMPRESSION:        Brain CT: Age-appropriate involutional and ischemic gliotic changes without significant change since 2020.    Cervical spine CT: Degenerative changes. No acute fractures or dislocations.    < end of copied text >  < from: CT Abdomen and Pelvis No Cont (21 @ 19:43) >  IMPRESSION:    Droplets of free air suggestive of perforated viscus. Moderate ascites.    Mottled air, soft tissue and fluid in the vicinity of the duodenal third portion (2:60), possibly pneumatosis or perforation. Hepatic portal venous gas suggestive of ischemic bowel.    Diffuse small bowel wall thickening.    Bilateral lower lobe infiltrate versus atelectasis. Trace bilateral pleural effusions.    Constipated colon.    A preliminary report was provided by Dr. Octavio Cortez of [facility]. Discrepancies include free air, portal venous gas and possible duodenal perforation.    CRITICAL VALUE: I discussed the major findings in this report with RERE RUTHERFORD  at 2021 1:47 PM with readback. Critical value policy of the hospital was followed. Read back and confirmation of receipt of this communication was  performed. This verbal communication supplements the text report of this document.    Addendum created by Octavio Cortez MD on 2021 10:13:50 PM EDT:  The results of this examination were discussed with RERE Marino at 9:10 p.m.  central standard time 2021.    < end of copied text >

## 2021-05-17 NOTE — PROGRESS NOTE ADULT - ASSESSMENT
Physical Examination:  GENERAL:               Lethargic/arousable , intubated, minimally responsive  HEENT:                    Pupils equal  Symmetric. No JVD, Dry  MM  PULM:                     Bilateral air entry, No Rales, No Rhonchi, No Wheezing  CVS:                         S1, S2,  + Murmur  ABD:                        firm  distended, tender, hypoactive  bowel sounds,   EXT:                         mild  edema, nontender,  Vascular:                cool Extremities, poor Capillary refill, Poor Distal Pulses  NEURO:                 opens eyes to touch.   PSYC:                      Calm, No Insight.        Assessment  Septic Shock from intra abdominal cause, wit persistent lactic acidosis Mesenteric Ischemia possible and CT showing duodenal perforation with GNR Pseudomonas Bacteremia     with multi organ Dysfunction  Upper GI bleed suspected s/p Code Fusion in ED, but no gross bleeding noted doubt at this time.   Lactic acidosis remains elevated lactate level  Leukopenia due to above    Thrombocytopenia worsening  Transaminitis   MAYELIN suspect normal baseline   Underlying Afib on Eliquis    Underlying bipolar disorder, h/o skin cancer,     Plan  Continue mechanical ventilation  Vasopressor support to maintain MAP > 65  Fentanyl for pain control  afib with rvr s/p dig  as not on a/c will try to avoid amio if able  Broad spectrum antibiotics and antifungals  Monitor Labs  NPO, PPI BID  NGT to suction  f/u cultures  Surgery f/u   Palliative care f/u   Hold Eliquis  hold po meds    Prognosis is poor given mutliple system organ     PMD:				                   Notified(Date):  Family Updated: 	Emergency Contact Kori Jefferson) [phone]	                                 Date:  5/17 message left for call back    Sedation & Analgesia:	as above  Diet/Nutrition:		NPO  GI PPx:			PPI BID    DVT Ppx:		Venodynes      Activity:		       advance as tolerated          Head of Bed:               35-45deg  Glycemic Control:        n/a    Lines: PIV  CENTRAL LINE: 	[x ] YES [  ] NO	                    LOCATION:   	                       DATE INSERTED:   	                    REMOVE:  [ ] YES [x ] NO    A-LINE:  	                [x ] YES [  ] NO                      LOCATION:   	                       DATE INSERTED: 		            REMOVE:  [ ] YES [x ] NO    VALDES: 		        [x ] YES [ ] NO  		                                       DATE INSERTED:		            REMOVE:  [ ] YES [x ] NO      Restraints may be deemed necessary to prevent removal of life-sustaining devices [  x] YES   [    ]  NO    Disposition: ICU care    Goals of Care: As per history patient was DNR/I in past with out documentation   palliative care called to help ascertain GOC.

## 2021-05-17 NOTE — PROVIDER CONTACT NOTE (CRITICAL VALUE NOTIFICATION) - NAME OF MD/NP/PA/DO NOTIFIED:
ALISSON JERNIGAN
RERE IVY
ALISSON JERNIGAN
RERE Marino
Devin CORONA
RERE Dailey
[facility] Corona
Villa Watkins

## 2021-05-17 NOTE — CHART NOTE - NSCHARTNOTEFT_GEN_A_CORE
Nutrition Follow Up Note  Hospital Course (Per Electronic Medical Record):   Source: Medical Record [X] MD/RN [X]  Nursing Staff [X]     Diet: NPO     Patient remains intubated , NPO since admission (5/13) , Surgical noted reviewed , patient with ischemic bowel , family not receptive to surgical intervention.     Current Weight: (5/15) 135.1/61.3kg , ? 7 lb  weight gain since admission     Pertinent Medications: MEDICATIONS  (STANDING):  albumin human 25% IVPB 50 milliLiter(s) IV Intermittent every 6 hours  chlorhexidine 0.12% Liquid 15 milliLiter(s) Oral Mucosa every 12 hours  chlorhexidine 4% Liquid 1 Application(s) Topical <User Schedule>  dextrose 5% + sodium chloride 0.45%. 1000 milliLiter(s) (100 mL/Hr) IV Continuous <Continuous>  fluconAZOLE IVPB      fluconAZOLE IVPB 200 milliGRAM(s) IV Intermittent every 24 hours  pantoprazole  Injectable 40 milliGRAM(s) IV Push every 12 hours  phenylephrine    Infusion 0.1 MICROgram(s)/kG/Min (2.03 mL/Hr) IV Continuous <Continuous>  piperacillin/tazobactam IVPB.. 3.375 Gram(s) IV Intermittent every 12 hours  vasopressin Infusion 0.04 Unit(s)/Min (2.4 mL/Hr) IV Continuous <Continuous>    MEDICATIONS  (PRN):  acetaminophen    Suspension .. 650 milliGRAM(s) Oral every 6 hours PRN Temp greater or equal to 38C (100.4F)  sodium chloride 0.9% lock flush 10 milliLiter(s) IV Push every 1 hour PRN Pre/post blood products, medications, blood draw, and to maintain line patency      Pertinent Labs:  05-17 Na142 mmol/L Glu 165 mg/dL<H> K+ 2.7 mmol/L<LL> Cr  3.70 mg/dL<H> BUN 70 mg/dL<H> 05-17 Phos 4.7 mg/dL<H> 05-17 Alb 1.8 g/dL<L>  POCT 145,142,132      Skin: Stage II lower lip     Edema: none     Last BM: (5/16) fecal incontinence noted    Estimated Needs:   [X] No Change since Previous Assessment      Previous Nutrition Diagnosis: Severe Malnutrition    Nutrition Diagnosis is [X] Ongoing         New Nutrition Diagnosis: [X] Not Applicable    Interventions:   1. follow clinical course       Monitoring & Evaluation: will monitor:  [X] Weights   [X] NPO status   [X] Follow Up (Per Protocol)        RD to follow as per Nutrition protocol  Daphne Quinonez RDN Nutrition Follow Up Note  Hospital Course (Per Electronic Medical Record):   Source: Medical Record [X] MD/RN [X]  Nursing Staff [X]     Diet: NPO     Patient remains intubated , NPO since admission (5/13) , Surgical noted reviewed , patient with ischemic bowel , family not receptive to surgical intervention. Palliative following ,     Current Weight: (5/15) 135.1/61.3kg , ? 7 lb  weight gain since admission     Pertinent Medications: MEDICATIONS  (STANDING):  albumin human 25% IVPB 50 milliLiter(s) IV Intermittent every 6 hours  chlorhexidine 0.12% Liquid 15 milliLiter(s) Oral Mucosa every 12 hours  chlorhexidine 4% Liquid 1 Application(s) Topical <User Schedule>  dextrose 5% + sodium chloride 0.45%. 1000 milliLiter(s) (100 mL/Hr) IV Continuous <Continuous>  fluconAZOLE IVPB      fluconAZOLE IVPB 200 milliGRAM(s) IV Intermittent every 24 hours  pantoprazole  Injectable 40 milliGRAM(s) IV Push every 12 hours  phenylephrine    Infusion 0.1 MICROgram(s)/kG/Min (2.03 mL/Hr) IV Continuous <Continuous>  piperacillin/tazobactam IVPB.. 3.375 Gram(s) IV Intermittent every 12 hours  vasopressin Infusion 0.04 Unit(s)/Min (2.4 mL/Hr) IV Continuous <Continuous>    MEDICATIONS  (PRN):  acetaminophen    Suspension .. 650 milliGRAM(s) Oral every 6 hours PRN Temp greater or equal to 38C (100.4F)  sodium chloride 0.9% lock flush 10 milliLiter(s) IV Push every 1 hour PRN Pre/post blood products, medications, blood draw, and to maintain line patency      Pertinent Labs:  05-17 Na142 mmol/L Glu 165 mg/dL<H> K+ 2.7 mmol/L<LL> Cr  3.70 mg/dL<H> BUN 70 mg/dL<H> 05-17 Phos 4.7 mg/dL<H> 05-17 Alb 1.8 g/dL<L>  POCT 145,142,132      Skin: Stage II lower lip     Edema: none     Last BM: (5/16) fecal incontinence noted    Estimated Needs:   [X] No Change since Previous Assessment      Previous Nutrition Diagnosis: Severe Malnutrition    Nutrition Diagnosis is [X] Ongoing         New Nutrition Diagnosis: [X] Not Applicable    Interventions:   1. follow clinical course       Monitoring & Evaluation: will monitor:  [X] Weights   [X] NPO status   [X] Follow Up (Per Protocol)        RD to follow as per Nutrition protocol  Daphne Quinonez RDN

## 2021-05-17 NOTE — PROGRESS NOTE ADULT - SUBJECTIVE AND OBJECTIVE BOX
Progress: on rounds, remains intubated in ccu this AM , in shock on pressors     Present Symptoms:   Dyspnea:   Nausea/Vomiting:   Anxiety:    Depressed Mood:   Fatigue:   Loss of appetite:   Pain:                   location:   Review of Systems:  Unable to obtain , intubated  MEDICATIONS  (STANDING):  fentaNYL   Infusion 1 MICROgram(s)/kG/Hr (5.4 mL/Hr) IV Continuous <Continuous>    MEDICATIONS  (PRN):  acetaminophen    Suspension .. 650 milliGRAM(s) Oral every 6 hours PRN Temp greater or equal to 38C (100.4F)  glycopyrrolate Injectable 0.1 milliGRAM(s) IV Push every 8 hours PRN Increased secretions  LORazepam   Injectable 2 milliGRAM(s) IV Push every 4 hours PRN Distress  sodium chloride 0.9% lock flush 10 milliLiter(s) IV Push every 1 hour PRN Pre/post blood products, medications, blood draw, and to maintain line patency      PHYSICAL EXAM:  Vital Signs Last 24 Hrs  T(C): 37.3 (17 May 2021 12:00), Max: 39.7 (16 May 2021 21:00)  T(F): 99.2 (17 May 2021 12:00), Max: 103.4 (16 May 2021 21:00)  HR: 140 (17 May 2021 14:00) (90 - 142)  BP: 86/50 (17 May 2021 14:00) (86/50 - 114/60)  BP(mean): 63 (17 May 2021 14:00) (54 - 82)  RR: 27 (17 May 2021 14:00) (20 - 28)  SpO2: 99% (17 May 2021 14:00) (92% - 100%)  General: drowsy, intubated      HEENT: n/c, a/t     Lungs: coarse bs    CV: tachy     GI: abd firm     : normal/lincoln    Musculoskeletal: weakened    Skin: w/d     Neuro:  intubated, non verbal, not following commands    Oral intake ability: unable  Diet: NPO     LABS:                          11.9   8.75  )-----------( 11       ( 17 May 2021 05:00 )             34.5     05-17    142  |  98  |  70<H>  ----------------------------<  165<H>  2.7<LL>   |  28  |  3.70<H>    Ca    7.5<L>      17 May 2021 05:00  Phos  4.7     05-17  Mg     2.0     05-17    TPro  4.9<L>  /  Alb  1.8<L>  /  TBili  1.8<H>  /  DBili  x   /  AST  148<H>  /  ALT  144<H>  /  AlkPhos  108  05-17        RADIOLOGY & ADDITIONAL STUDIES: < from: Xray Chest 1 View- PORTABLE-Routine (Xray Chest 1 View- PORTABLE-Routine in AM.) (05.17.21 @ 07:05) >    EXAM:  XR CHEST PORTABLE ROUTINE 1V      PROCEDURE DATE:  05/17/2021        INTERPRETATION:  Portable chest radiograph    CLINICAL INFORMATION: Assess for pneumonia    TECHNIQUE:  Portable  AP view of the chest was obtained.    COMPARISON: 5/15/2021 chest available for review.    FINDINGS:  ET tube tip above tracheal bifurcation.  NG tube tip beyond GE junction.  LEFT IJ catheter tip in SVC.    The lungs are clear of airspace consolidations or effusions. No pneumothorax.    The heart and mediastinum are within normal limits.    Visualized osseous structures are intact.      IMPRESSION:   No evidence of active chest disease.  Catheters and tubes in place.        ADVANCE DIRECTIVES: HCP, Molst  dnr/dni   Advanced Care Planning discussion total time spent:

## 2021-05-17 NOTE — PROVIDER CONTACT NOTE (CRITICAL VALUE NOTIFICATION) - SITUATION
RERE Marino notified of critical labs lact 4, WBC 0.99, 1 L LR administered. Will continue to monitor
Platelets , 14
RERE HOUSE notified of critical value (  BG 54 ) . Dex 50 administered
Patient K+2.7, lactate 5.3

## 2021-05-17 NOTE — PROGRESS NOTE ADULT - SUBJECTIVE AND OBJECTIVE BOX
Seen in ICU    Vital Signs Last 24 Hrs  T(C): 38.3 (05-17-21 @ 16:00), Max: 39.7 (05-16-21 @ 21:00)  T(F): 101 (05-17-21 @ 16:00), Max: 103.4 (05-16-21 @ 21:00)  HR: 143 (05-17-21 @ 18:00) (90 - 150)  BP: 80/46 (05-17-21 @ 18:00) (80/46 - 106/53)  BP(mean): 58 (05-17-21 @ 18:00) (54 - 82)  RR: 29 (05-17-21 @ 18:00) (20 - 29)  SpO2: 100% (05-17-21 @ 18:00) (92% - 100%)    I&O's Detail    16 May 2021 07:01  -  17 May 2021 07:00  --------------------------------------------------------  IN:    dextrose 5% + sodium chloride 0.45%: 1700 mL    Phenylephrine: 178.2 mL    Sodium Bicarbonate: 250 mL    Vasopressin: 52.8 mL  Total IN: 2181 mL    OUT:    Indwelling Catheter - Urethral (mL): 1250 mL  Total OUT: 1250 mL    Total NET: 931 mL    17 May 2021 07:01  -  17 May 2021 20:03  --------------------------------------------------------  OUT:    Indwelling Catheter - Urethral (mL): 900 mL  Total OUT: 900 mL    s1s2  b/l air entry  soft, ND  sm edema                           11.9   8.75  )-----------( 11       ( 17 May 2021 05:00 )             34.5     17 May 2021 05:00    142    |  98     |  70     ----------------------------<  165    2.7     |  28     |  3.70     Ca    7.5        17 May 2021 05:00  Phos  4.7       17 May 2021 05:00  Mg     2.0       17 May 2021 05:00    TPro  4.9    /  Alb  1.8    /  TBili  1.8    /  DBili  x      /  AST  148    /  ALT  144    /  AlkPhos  108    17 May 2021 05:00    LIVER FUNCTIONS - ( 17 May 2021 05:00 )  Alb: 1.8 g/dL / Pro: 4.9 g/dL / ALK PHOS: 108 U/L / ALT: 144 U/L / AST: 148 U/L / GGT: x           CARDIAC MARKERS ( 16 May 2021 06:20 )  x     / x     / 1079 U/L / x     / x        acetaminophen    Suspension .. 650 milliGRAM(s) Oral every 6 hours PRN  fentaNYL   Infusion 1 MICROgram(s)/kG/Hr IV Continuous <Continuous>  glycopyrrolate Injectable 0.1 milliGRAM(s) IV Push every 8 hours PRN  LORazepam   Injectable 2 milliGRAM(s) IV Push every 4 hours PRN  sodium chloride 0.9% lock flush 10 milliLiter(s) IV Push every 1 hour PRN    A/P:    Sepsis, shock, bowel ischemia, s/p intubated in ICU  Septic/hemodynamic ATN on CKD 3 (Cr 1.36 - 4/13/21)  Prognosis is poor overall  No surgery per HCP  DNR/I  S/p palliative wean  No further blood work  Comfort care only    [phone]

## 2021-05-17 NOTE — PROVIDER CONTACT NOTE (CRITICAL VALUE NOTIFICATION) - TEST AND RESULT REPORTED:
Blood culture - in both bottles gram positive cocci in clusters
LACTATE 5.3
lact 4, WBC 0.99
BG 54
Platelets , 14
BLOOD CULTURE DRAWN ON 5/3/21 BOTH BOTTOE GROWTH  GRAM POSITIVE COCCI IN CLUSTERS
LACTATE 10.1
K+2.7, lactate 5.3

## 2021-05-17 NOTE — PROVIDER CONTACT NOTE (CRITICAL VALUE NOTIFICATION) - ACTION/TREATMENT ORDERED:
PATIENT IS ALREADY ON ZOZYN, VANCOMYCIN AND GENTAMICIN ANTI BIOTICS
Vancomycin to be added
cont to monitor. no further interventions ordered at this time.
K+ ordered
WILL ORDER NS BOLUS
NS BOLUS GIVEN

## 2021-05-17 NOTE — PROGRESS NOTE ADULT - SUBJECTIVE AND OBJECTIVE BOX
Follow-up Critical Care Progress Note  Chief Complaint : Gastrointestinal hemorrhage    Remains in shock on vasopressors.     Allergies :No Known Allergies      PAST MEDICAL & SURGICAL HISTORY:  Bipolar 1 disorder    Skin cancer  Basla cell &amp; squamous cells    Frozen shoulder    Plantar fasciitis  left foot    History of skin cancer    Medications:  MEDICATIONS  (STANDING):  albumin human 25% IVPB 50 milliLiter(s) IV Intermittent every 6 hours  chlorhexidine 0.12% Liquid 15 milliLiter(s) Oral Mucosa every 12 hours  chlorhexidine 4% Liquid 1 Application(s) Topical <User Schedule>  dextrose 5% + sodium chloride 0.45%. 1000 milliLiter(s) (100 mL/Hr) IV Continuous <Continuous>  fluconAZOLE IVPB      fluconAZOLE IVPB 200 milliGRAM(s) IV Intermittent every 24 hours  pantoprazole  Injectable 40 milliGRAM(s) IV Push every 12 hours  phenylephrine    Infusion 0.1 MICROgram(s)/kG/Min (2.03 mL/Hr) IV Continuous <Continuous>  piperacillin/tazobactam IVPB.. 3.375 Gram(s) IV Intermittent every 12 hours  vasopressin Infusion 0.04 Unit(s)/Min (2.4 mL/Hr) IV Continuous <Continuous>    MEDICATIONS  (PRN):  acetaminophen    Suspension .. 650 milliGRAM(s) Oral every 6 hours PRN Temp greater or equal to 38C (100.4F)  sodium chloride 0.9% lock flush 10 milliLiter(s) IV Push every 1 hour PRN Pre/post blood products, medications, blood draw, and to maintain line patency      LABS:                        11.9   8.75  )-----------( 11       ( 17 May 2021 05:00 )             34.5     05-17    142  |  98  |  70<H>  ----------------------------<  165<H>  2.7<LL>   |  28  |  3.70<H>    Ca    7.5<L>      17 May 2021 05:00  Phos  4.7     05-17  Mg     2.0     05-17    TPro  4.9<L>  /  Alb  1.8<L>  /  TBili  1.8<H>  /  DBili  x   /  AST  148<H>  /  ALT  144<H>  /  AlkPhos  108  05-17      CARDIAC MARKERS ( 16 May 2021 06:20 )  x     / x     / 1079 U/L / x     / x                  Serum Pro-Brain Natriuretic Peptide: 46465 pg/mL (05-16-21 @ 06:20)        CULTURES: (if applicable)    Culture - Blood (collected 05-13-21 @ 22:00)  Source: .Blood Blood-Peripheral  Gram Stain (05-14-21 @ 17:38):    Growth in aerobic and anaerobic bottles: Gram Positive Cocci in Clusters  Final Report (05-15-21 @ 17:51):    Growth in aerobic and anaerobic bottles: Staphylococcus simulans    Coag Negative Staphylococcus    Single set isolate, possible contaminant. Contact    Microbiology if susceptibility testing clinically    indicated.    ***Blood Panel PCR results on this specimen are available    approximately 3 hours after the Gram stain result.***    Gram stain, PCR, and/or culture results may not always    correspond due to difference in methodologies.    ************************************************************    This PCR assay was performed by multiplex PCR. This    Assay tests for 66 bacterial and resistance gene targets.    Please refer to the [facility] test directory    at [email or web address] for details.  Organism: Blood Culture PCR (05-15-21 @ 17:51)  Organism: Blood Culture PCR (05-15-21 @ 17:51)      -  Coagulase negative Staphylococcus: Detec      Method Type: PCR    Culture - Urine (collected 05-13-21 @ 21:10)  Source: .Urine Clean Catch (Midstream)  Final Report (05-15-21 @ 01:11):    <10,000 CFU/mL Normal Urogenital Carrie    Culture - Blood (collected 05-13-21 @ 21:10)  Source: .Blood Blood-Peripheral  Gram Stain (05-14-21 @ 22:55):    Growth in anaerobic bottle: Gram Positive Cocci in Clusters and Gram    Negative Rods    Growth in aerobic bottle: Gram Negative Rods and Gram Positive Cocci in    Clusters  Final Report (05-16-21 @ 16:10):    Growth in aerobic and anaerobic bottles: Pseudomonas aeruginosa    Growth in anaerobic bottle: Staphylococcus hominis    Growth in anaerobic bottle: Staphylococcus epidermidis    Growth in aerobic bottle: Staphylococcus capitis    Coag Negative Staphylococcus    Single set isolate, possible contaminant. Contact    Microbiology if susceptibility testing clinically    indicated.    ***Blood Panel PCR results on this specimen are available    approximately 3 hours after the Gram stain result.***    Gram stain, PCR, and/or culture results may not always    correspond due to difference in methodologies.    ************************************************************    This PCR assay was performed by multiplex PCR. This    Assay tests for 66 bacterial and resistance gene targets.    Please refer to the [facility] test directory    at [email or web address] for details.  Organism: Blood Culture PCR  Pseudomonas aeruginosa (05-16-21 @ 16:10)  Organism: Pseudomonas aeruginosa (05-16-21 @ 16:10)      -  Amikacin: S <=16      -  Aztreonam: S <=4      -  Cefepime: S <=2      -  Ceftazidime: S 4      -  Ciprofloxacin: S <=0.25      -  Gentamicin: S 4      -  Levofloxacin: S <=0.5      -  Meropenem: S <=1      -  Piperacillin/Tazobactam: S <=8      -  Tobramycin: S <=2      Method Type: RANDALL  Organism: Blood Culture PCR (05-16-21 @ 16:10)      -  Pseudomonas aeruginosa: Detec      Method Type: PCR          ABG - ( 17 May 2021 05:00 )  pH, Arterial: 7.47  pH, Blood: x     /  pCO2: 35    /  pO2: 162   / HCO3: x     / Base Excess: x     /  SaO2: 98                CAPILLARY BLOOD GLUCOSE      POCT Blood Glucose.: 145 mg/dL (17 May 2021 05:52)      RADIOLOGY  CXR:      CT:    ECHO:      VITALS:  T(C): 37.3 (05-17-21 @ 06:00), Max: 39.7 (05-16-21 @ 21:00)  T(F): 99.1 (05-17-21 @ 06:00), Max: 103.4 (05-16-21 @ 21:00)  HR: 96 (05-17-21 @ 09:40) (90 - 138)  BP: 96/73 (05-17-21 @ 08:00) (93/70 - 114/60)  BP(mean): 82 (05-17-21 @ 08:00) (62 - 82)  ABP: 101/60 (05-17-21 @ 09:00) (86/52 - 130/76)  ABP(mean): 77 (05-17-21 @ 09:00) (64 - 98)  RR: 25 (05-17-21 @ 09:00) (20 - 28)  SpO2: 100% (05-17-21 @ 09:40) (92% - 100%)  CVP(mm Hg): --  CVP(cm H2O): --    Ins and Outs     05-16-21 @ 07:01  -  05-17-21 @ 07:00  --------------------------------------------------------  IN: 2181 mL / OUT: 1250 mL / NET: 931 mL          Weight (kg): 54 (05-14-21 @ 14:30)    Device: Avea, Mode: AC/ CMV (Assist Control/ Continuous Mandatory Ventilation), RR (machine): 20, RR (patient): 24, TV (machine): 400, TV (patient): 360, FiO2: 40, PEEP: 5, ITime: 1, MAP: 5, PIP: 19    I&O's Detail    16 May 2021 07:01  -  17 May 2021 07:00  --------------------------------------------------------  IN:    dextrose 5% + sodium chloride 0.45%: 1700 mL    Phenylephrine: 178.2 mL    Sodium Bicarbonate: 250 mL    Vasopressin: 52.8 mL  Total IN: 2181 mL    OUT:    Indwelling Catheter - Urethral (mL): 1250 mL  Total OUT: 1250 mL    Total NET: 931 mL

## 2021-05-17 NOTE — PROGRESS NOTE ADULT - ASSESSMENT
A/P : 75y female with Bipolar d/o, Afib, presented to ED 5/13 after episode of vomiting at home and episode of unresponsive. In ER  patient was  hypotensive and intubated for  airway protection. NGT placed 500cc coffee ground emesis drained. Pt received blood transfusion, now intubated, she was also noted to have leukopenia and fever and now on broad spectrum coverage. She is not able to provide any history at present  Given fever, neutropenia and GI bleed, suspect possible bowel source, ? neutropenic typhlitis, GNR sepsis  Blood cult now with staph species and pseudomonas  CT findings suggestive of ischemic bowel    Per CCU:     Septic Shock from intra abdominal cause, with persistent lactic acidosis Mesenteric Ischemia possible and CT showing duodenal perforation with GNR Pseudomonas Bacteremia     with multi organ Dysfunction  Upper GI bleed suspected s/p Code Fusion in ED, but no gross bleeding noted doubt at this time.   Lactic acidosis remains elevated lactate level  Leukopenia due to above    Thrombocytopenia worsening  Transaminitis   MAYELIN suspect normal baseline   Underlying Afib on Eliquis    Underlying bipolar disorder, h/o skin cancer,     Plan  Continue mechanical ventilation  Vasopressor support to maintain MAP > 65  Fentanyl for pain control  afib with rvr s/p dig  as not on a/c will try to avoid amio if able  Broad spectrum antibiotics and antifungals  Monitor Labs  NPO, PPI BID  NGT to suction  f/u cultures  Surgery f/u   Palliative care f/u   Hold Eliquis  hold po meds    Prognosis is poor given multiple system organ failure      Palliative :  reviewed case w/ccu  team this AM, chart reviewed, pt seen bedside, was intubated, did open eyes , but does not follow commands.  Ccu  had discussion w/ hcp and cousin Katy, later this morning, family has decided for comfort care measures, and palliative wean. I also called  and spoke to Katy as f/u, she confirmed they wish to stop the pressors and to remove the ventilator . They wish to focus on her comfort only. They do not want surgery to be done, and they wish for pain medication for comfort.  She stated she will be in to visit today .      Will cont to follow w/ ccu team , prognosis  poor .

## 2021-05-18 NOTE — PROGRESS NOTE ADULT - SUBJECTIVE AND OBJECTIVE BOX
Seen in ICU    Vital Signs Last 24 Hrs  T(C): 36.4 (05-18-21 @ 13:47), Max: 36.4 (05-18-21 @ 13:47)  T(F): 97.5 (05-18-21 @ 13:47), Max: 97.5 (05-18-21 @ 13:47)  HR: 58 (05-18-21 @ 13:47) (58 - 151)  BP: 57/33 (05-18-21 @ 13:47) (57/33 - 92/46)  BP(mean): 61 (05-18-21 @ 00:00) (58 - 61)  RR: 11 (05-18-21 @ 13:47) (0 - 29)  SpO2: 79% (05-18-21 @ 13:47) (79% - 100%)    17 May 2021 07:01  -  18 May 2021 07:00  --------------------------------------------------------  OUT:    Indwelling Catheter - Urethral (mL): 900 mL  Total OUT: 900 mL    s1s2  b/l air entry  soft, ND  sm edema                                  11.9   8.75  )-----------( 11       ( 17 May 2021 05:00 )             34.5     17 May 2021 05:00    142    |  98     |  70     ----------------------------<  165    2.7     |  28     |  3.70     Ca    7.5        17 May 2021 05:00  Phos  4.7       17 May 2021 05:00  Mg     2.0       17 May 2021 05:00    TPro  4.9    /  Alb  1.8    /  TBili  1.8    /  DBili  x      /  AST  148    /  ALT  144    /  AlkPhos  108    17 May 2021 05:00    LIVER FUNCTIONS - ( 17 May 2021 05:00 )  Alb: 1.8 g/dL / Pro: 4.9 g/dL / ALK PHOS: 108 U/L / ALT: 144 U/L / AST: 148 U/L / GGT: x           acetaminophen    Suspension .. 650 milliGRAM(s) Oral every 6 hours PRN  fentaNYL   Infusion 1 MICROgram(s)/kG/Hr IV Continuous <Continuous>  glycopyrrolate Injectable 0.1 milliGRAM(s) IV Push every 8 hours PRN  LORazepam   Injectable 2 milliGRAM(s) IV Push every 4 hours PRN  sodium chloride 0.9% lock flush 10 milliLiter(s) IV Push every 1 hour PRN    A/P:    Sepsis, shock, bowel ischemia, s/p intubated in ICU  Septic/hemodynamic ATN on CKD 3 (Cr 1.36 - 4/13/21)  No surgery per HCP, DNR/I  S/p palliative wean  No further blood work  Comfort care   Will sign off    [phone]

## 2021-05-18 NOTE — PROGRESS NOTE ADULT - REASON FOR ADMISSION
GI Bleed

## 2021-05-18 NOTE — PROGRESS NOTE ADULT - NUTRITIONAL ASSESSMENT
This patient has been assessed with a concern for Malnutrition and has been determined to have a diagnosis/diagnoses of Severe protein-calorie malnutrition.    This patient is being managed with:   Diet NPO-  Entered: May 13 2021 10:22PM    

## 2021-05-18 NOTE — PROGRESS NOTE ADULT - ASSESSMENT
ASSESSMENT:  Septic Shock from intra abdominal cause, with persistent lactic acidosis Mesenteric Ischemia possible and CT showing duodenal perforation with GNR Pseudomonas Bacteremia     with multi organ Dysfunction  Upper GI bleed suspected s/p Code Fusion in ED, but no gross bleeding noted doubt at this time.   Lactic acidosis remains elevated lactate level  Leukopenia due to above    Thrombocytopenia worsening  Transaminitis   MAYELIN suspect normal baseline   Underlying Afib on Eliquis    PLAN:  - Comfort care  - Discontinue non-essential lines, drains, monitoring devices, etc.  - Fentanyl gtt, non-titratable

## 2021-05-18 NOTE — PROGRESS NOTE ADULT - PROVIDER SPECIALTY LIST ADULT
Surgery
Critical Care
Critical Care
Infectious Disease
Nephrology
Nephrology
Critical Care
Critical Care
Infectious Disease
MICU
Palliative Care
Palliative Care
Infectious Disease
Nephrology
Nephrology
Infectious Disease
Critical Care
Surgery
Critical Care

## 2021-05-18 NOTE — PROGRESS NOTE ADULT - ATTENDING COMMENTS
patient seen and examined  now on comfort measures  on fentanyl drip  patient lethargic, agonally breathing  patient has started the dieing process.

## 2021-05-18 NOTE — PROGRESS NOTE ADULT - NSICDXPILOT_GEN_ALL_CORE
[place]

## 2021-05-18 NOTE — PROGRESS NOTE ADULT - ASSESSMENT
A/P  75y female with Bipolar d/o, Afib, presented to ED 5/13 after episode of vomiting at home and episode of unresponsive.    Septic Shock from intra abdominal cause, with persistent lactic acidosis Mesenteric Ischemia possible and CT showing duodenal perforation with GNR Pseudomonas Bacteremia     with multi organ Dysfunction  Upper GI bleed suspected s/p Code Fusion in ED, but no gross bleeding noted doubt at this time.   Lactic acidosis remains elevated lactate level  Leukopenia due to above    Thrombocytopenia worsening  Transaminitis   PLAN:  - Comfort care  - Discontinue non-essential lines, drains, monitoring devices, etc.  - Fentanyl gtt, non-titratable    Palliative :  for f/u as above, pt on comfort measures and is in dying process. She appears comfortable on fentanyl drip.  I Called Katy pts cousin and notified her end is near. She is on way to hospital.

## 2021-05-18 NOTE — PROGRESS NOTE ADULT - SUBJECTIVE AND OBJECTIVE BOX
CC: Patient is a 75y old  Female who presents with a chief complaint of GI Bleed (17 May 2021 20:02)      S: Comfort care. Patient appears comfortable. Remains hypotensive.    Patient seen and examined at bedside.    ALLERGIES:  No Known Allergies      MEDICATIONS:  acetaminophen    Suspension .. 650 milliGRAM(s) Oral every 6 hours PRN  fentaNYL   Infusion 1 MICROgram(s)/kG/Hr IV Continuous <Continuous>  glycopyrrolate Injectable 0.1 milliGRAM(s) IV Push every 8 hours PRN  LORazepam   Injectable 2 milliGRAM(s) IV Push every 4 hours PRN  sodium chloride 0.9% lock flush 10 milliLiter(s) IV Push every 1 hour PRN        Vital Signs Last 24 Hrs  T(F): 101 (17 May 2021 16:00), Max: 101 (17 May 2021 16:00)  HR: 151 (18 May 2021 09:00) (108 - 151)  BP: 92/46 (18 May 2021 00:00) (80/46 - 106/53)  RR: 22 (18 May 2021 09:00) (0 - 29)  SpO2: 99% (18 May 2021 07:00) (99% - 100%)  I&O's Summary    17 May 2021 07:01  -  18 May 2021 07:00  --------------------------------------------------------  IN: 0 mL / OUT: 900 mL / NET: -900 mL        GENERAL:               Lethargic/arousable , intubated, minimally responsive  HEENT:                    Pupils equal  Symmetric. No JVD, Dry  MM  PULM:                     Bilateral air entry, No Rales, No Rhonchi, No Wheezing  CVS:                         S1, S2,  + Murmur  ABD:                        firm  distended, tender, hypoactive  bowel sounds,   EXT:                         mild  edema, nontender,  Vascular:                cool Extremities, poor Capillary refill, Poor Distal Pulses  NEURO:                 opens eyes to touch.   PSYC:                      Calm, No Insight.    LABS:                        11.9   8.75  )-----------( 11       ( 17 May 2021 05:00 )             34.5     05-17    142  |  98  |  70  ----------------------------<  165  2.7   |  28  |  3.70    Ca    7.5      17 May 2021 05:00  Phos  4.7     05-17  Mg     2.0     05-17    TPro  4.9  /  Alb  1.8  /  TBili  1.8  /  DBili  x   /  AST  148  /  ALT  144  /  AlkPhos  108  05-17    eGFR if Non African American: 11 mL/min/1.73M2 (05-17-21 @ 05:00)  eGFR if : 13 mL/min/1.73M2 (05-17-21 @ 05:00)      Lactate, Blood: 5.3 mmol/L (05-17 @ 06:51)  Lactate, Blood: 5.4 mmol/L (05-16 @ 20:23)  Lactate, Blood: 6.7 mmol/L (05-16 @ 06:20)    CARDIAC MARKERS ( 16 May 2021 06:20 )  x     / x     / 1079 U/L / x     / x                ABG - ( 17 May 2021 05:00 )  pH, Arterial: 7.47  pH, Blood: x     /  pCO2: 35    /  pO2: 162   / HCO3: x     / Base Excess: x     /  SaO2: 98                      POCT Blood Glucose.: 192 mg/dL (17 May 2021 17:27)          Culture - Blood (collected 13 May 2021 22:00)  Source: .Blood Blood-Peripheral  Gram Stain (14 May 2021 17:38):    Growth in aerobic and anaerobic bottles: Gram Positive Cocci in Clusters  Final Report (15 May 2021 17:51):    Growth in aerobic and anaerobic bottles: Staphylococcus simulans    Coag Negative Staphylococcus    Single set isolate, possible contaminant. Contact    Microbiology if susceptibility testing clinically    indicated.    ***Blood Panel PCR results on this specimen are available    approximately 3 hours after the Gram stain result.***    Gram stain, PCR, and/or culture results may not always    correspond due to difference in methodologies.    ************************************************************    This PCR assay was performed by multiplex PCR. This    Assay tests for 66 bacterial and resistance gene targets.    Please refer to the [facility] test directory    at [email or web address] for details.  Organism: Blood Culture PCR (15 May 2021 17:51)  Organism: Blood Culture PCR (15 May 2021 17:51)      -  Coagulase negative Staphylococcus: Detec      Method Type: PCR    Culture - Urine (collected 13 May 2021 21:10)  Source: .Urine Clean Catch (Midstream)  Final Report (15 May 2021 01:11):    <10,000 CFU/mL Normal Urogenital Carrie    Culture - Blood (collected 13 May 2021 21:10)  Source: .Blood Blood-Peripheral  Gram Stain (14 May 2021 22:55):    Growth in anaerobic bottle: Gram Positive Cocci in Clusters and Gram    Negative Rods    Growth in aerobic bottle: Gram Negative Rods and Gram Positive Cocci in    Clusters  Final Report (16 May 2021 16:10):    Growth in aerobic and anaerobic bottles: Pseudomonas aeruginosa    Growth in anaerobic bottle: Staphylococcus hominis    Growth in anaerobic bottle: Staphylococcus epidermidis    Growth in aerobic bottle: Staphylococcus capitis    Coag Negative Staphylococcus    Single set isolate, possible contaminant. Contact    Microbiology if susceptibility testing clinically    indicated.    ***Blood Panel PCR results on this specimen are available    approximately 3 hours after the Gram stain result.***    Gram stain, PCR, and/or culture results may not always    correspond due to difference in methodologies.    ************************************************************    This PCR assay was performed by multiplex PCR. This    Assay tests for 66 bacterial and resistance gene targets.    Please refer to the [facility] test directory    at [email or web address] for details.  Organism: Blood Culture PCR  Pseudomonas aeruginosa (16 May 2021 16:10)  Organism: Pseudomonas aeruginosa (16 May 2021 16:10)      -  Amikacin: S <=16      -  Aztreonam: S <=4      -  Cefepime: S <=2      -  Ceftazidime: S 4      -  Ciprofloxacin: S <=0.25      -  Gentamicin: S 4      -  Levofloxacin: S <=0.5      -  Meropenem: S <=1      -  Piperacillin/Tazobactam: S <=8      -  Tobramycin: S <=2      Method Type: RANDALL  Organism: Blood Culture PCR (16 May 2021 16:10)      -  Pseudomonas aeruginosa: Detec      Method Type: PCR        RADIOLOGY & ADDITIONAL TESTS:    Care Discussed with Consultants/Other Providers:

## 2021-05-18 NOTE — PROGRESS NOTE ADULT - SUBJECTIVE AND OBJECTIVE BOX
Progress: unresponsive, appears comfortable    Present Symptoms:   Dyspnea:   Nausea/Vomiting:   Anxiety:    Depressed Mood:   Fatigue:   Loss of appetite:   Pain:                   location:   Review of Systems: Unable to obtain due to poor mentation]    MEDICATIONS  (STANDING):  fentaNYL   Infusion 1 MICROgram(s)/kG/Hr (5.4 mL/Hr) IV Continuous <Continuous>    MEDICATIONS  (PRN):  acetaminophen    Suspension .. 650 milliGRAM(s) Oral every 6 hours PRN Temp greater or equal to 38C (100.4F)  glycopyrrolate Injectable 0.1 milliGRAM(s) IV Push every 8 hours PRN Increased secretions  LORazepam   Injectable 2 milliGRAM(s) IV Push every 4 hours PRN Distress  sodium chloride 0.9% lock flush 10 milliLiter(s) IV Push every 1 hour PRN Pre/post blood products, medications, blood draw, and to maintain line patency      PHYSICAL EXAM:  Vital Signs Last 24 Hrs  T(C): 38.3 (17 May 2021 16:00), Max: 38.3 (17 May 2021 16:00)  T(F): 101 (17 May 2021 16:00), Max: 101 (17 May 2021 16:00)  HR: 151 (18 May 2021 09:00) (108 - 151)  BP: 92/46 (18 May 2021 00:00) (80/46 - 106/53)  BP(mean): 61 (18 May 2021 00:00) (58 - 68)  RR: 22 (18 May 2021 09:00) (0 - 29)  SpO2: 99% (18 May 2021 07:00) (99% - 100%)  General: unresponsive       HEENT: normal , mouth dry    Lungs: dim agonal breaths   CV: tachy    Neuro: unresponsive    Oral intake ability:  no  Diet: NPO,     LABS:                          11.9   8.75  )-----------( 11       ( 17 May 2021 05:00 )             34.5     05-17    142  |  98  |  70<H>  ----------------------------<  165<H>  2.7<LL>   |  28  |  3.70<H>    Ca    7.5<L>      17 May 2021 05:00  Phos  4.7     05-17  Mg     2.0     05-17    TPro  4.9<L>  /  Alb  1.8<L>  /  TBili  1.8<H>  /  DBili  x   /  AST  148<H>  /  ALT  144<H>  /  AlkPhos  108  05-17        RADIOLOGY & ADDITIONAL STUDIES:    ADVANCE DIRECTIVES: HCP  Molst dnr/dni , comfort   Advanced Care Planning discussion total time spent:

## 2021-05-19 NOTE — CHART NOTE - NSCHARTNOTEFT_GEN_A_CORE
Called HCP Ginger Huizar at  to notify of expiration but unable to reach will sign out to day team to try again to notify family.

## 2021-05-19 NOTE — DISCHARGE NOTE FOR THE EXPIRED PATIENT - HOSPITAL COURSE
74 y/o F w/ pmh of bipolar, afib on eliquis, presented to [facility] on 05/13/2021 for an episode of vomiting and unresponsive, in the ED pt was intubated intubated for airway protection and was found to be hypotensive pt had an NGT placed and was found to have 500cc of coffee ground emesis pt underwent an CODE MTP due to concern for hemorrhagic shock in the setting of GIB, pt was found found to hiwot n septic shock w/ severe lactic acidosis possible from mesenteric ischemia, duodenal perforation w/ GNR pseudomonas bacteremia w/ multiorgan dysfunction, pt was made comfort care on 05/18/2021 and started on fentalyn gtt and all other medical treatment was stopped. Tonight at appx 320am pt was noted to be no longer breathing. Pt seen and examined noted to have fixed and dilated pupils, unresponsive to verbal/tactile/painful stimuli, no signs of breathing, no carotid/femoral/radial palpable pulse, skin was cold to touch pt pronounced at 323am, HCP Ginger Huizar contacted at  but unable to reach will try again the AM.    75 year old female with pmh of bipolar, afib on eliquis, presented to [facility] on 05/13/2021 for an episode of vomiting and unresponsive, in the ED pt was intubated intubated for airway protection and was found to be hypotensive pt had an NGT placed and was found to have 500cc of coffee ground emesis pt underwent an CODE Fusion due to concern for hemorrhagic shock in the setting of GIB, pt was found found to hiwot n septic shock w/ severe lactic acidosis possible from mesenteric ischemia, duodenal perforation and peritonitis  w/ GNR pseudomonas bacteremia w/ multiorgan dysfunction.   She was made comfort care on 05/18/2021 and started on Fentanyl drip and all other medical treatment was stopped.   on 5/19 at approximately 320am pt was noted to be no longer breathing. Pt seen and examined noted to have fixed and dilated pupils, unresponsive to verbal/tactile/painful stimuli, no signs of breathing, no carotid/femoral/radial palpable pulse, skin was cold to touch pt pronounced at 323am, HCP Ginger Huizar contacted at  and was updated .       For full account of hospital course please refer to actual medical records. As this is a brief summery.

## 2021-07-12 ENCOUNTER — RX RENEWAL (OUTPATIENT)
Age: 76
End: 2021-07-12

## 2022-08-16 NOTE — ED ADULT NURSE NOTE - NS ED PATIENT SAFETY CONCERN
[address] [facility] and they stated patient picked up albuterol. Unable to assess due to medical condition

## 2023-03-30 NOTE — DIETITIAN INITIAL EVALUATION ADULT. - PERTINENT MEDS FT
MEDICATIONS  (STANDING):  ARIPiprazole 5 milliGRAM(s) Oral daily  chlorhexidine 0.12% Liquid 15 milliLiter(s) Oral Mucosa every 12 hours  chlorhexidine 4% Liquid 1 Application(s) Topical <User Schedule>  mirtazapine 15 milliGRAM(s) Oral at bedtime  norepinephrine Infusion 0.05 MICROgram(s)/kG/Min (2.53 mL/Hr) IV Continuous <Continuous>  pantoprazole Infusion 8 mG/Hr (10 mL/Hr) IV Continuous <Continuous>  phenylephrine    Infusion 0.1 MICROgram(s)/kG/Min (2.03 mL/Hr) IV Continuous <Continuous>  piperacillin/tazobactam IVPB.. 3.375 Gram(s) IV Intermittent every 12 hours  propofol Infusion 5 MICROgram(s)/kG/Min (1.44 mL/Hr) IV Continuous <Continuous>  sodium bicarbonate  Infusion 0.347 mEq/kG/Hr (125 mL/Hr) IV Continuous <Continuous>  sodium chloride 0.9%. 1000 milliLiter(s) (100 mL/Hr) IV Continuous <Continuous>    MEDICATIONS  (PRN):  sodium chloride 0.9% lock flush 10 milliLiter(s) IV Push every 1 hour PRN Pre/post blood products, medications, blood draw, and to maintain line patency Statement Selected

## 2023-09-11 NOTE — CONSULT NOTE ADULT - SUBJECTIVE AND OBJECTIVE BOX
. [facility] - MAY Lynn / MAY Delgado / JUDI Kim/ MAY Castañeda/ MAY Christina/ CHASE Roberto / LACY Li / ADRIANA Shelley  -------------------------------------------------------------------------------------------------------  The patient seen and examined today.  HPI:  Patient is 76 yo who was found on floor by EMS. neighbor  called because mail was piling up. Dog in house with feces all over. Unknown down time but seems long. Patient speaking but not in response to questions. Looks very dry. No obvious painful or deformed parts. Patient with hx of bipolar. on Li and buproprion.  Patient had expressed sadness and said "if it was not for my dog I would kill myself" to neighbor recently.    (18 Sep 2020 17:31)      PAST MEDICAL & SURGICAL HISTORY:  Frozen shoulder    Skin cancer  Basla cell &amp; squamous cells    Bipolar 1 disorder    History of skin cancer    Plantar fasciitis  left foot      Allergies :- No Known Allergies    Home Medications Reviewed  Hospital Medications:   MEDICATIONS  (STANDING):  aspirin enteric coated 81 milliGRAM(s) Oral daily  diltiazem Infusion 5 mG/Hr (5 mL/Hr) IV Continuous <Continuous>  metoprolol succinate ER 25 milliGRAM(s) Oral daily  sodium chloride 0.45%. 1000 milliLiter(s) (75 mL/Hr) IV Continuous <Continuous>    SOCIAL HISTORY:  Denies ETOh,Smoking,   FAMILY HISTORY:  Family history of acute myocardial infarction (Father)        REVIEW OF SYSTEMS: Poor historian unable to provide history  Has no complaints    VITALS:  T(F): 97.5 (20 @ 20:24), Max: 97.6 (20 @ 18:40)  HR: 99 (20 @ 21:00)  BP: 115/88 (20 @ 21:00)  RR: 18 (20 @ 21:00)  SpO2: 100% (09-18-20 @ 21:00)  Wt(kg): --    Height (cm): 165.1 ( @ 12:32)    PHYSICAL EXAM:  Constitutional: NAD  HEENT: anicteric sclera, oropharynx clear, MMM  Neck: supple.   Respiratory: Bilateral equal breath sounds , no wheezes, no crackles  Cardiovascular: S1, S2, Regular, Murmur present.  Gastrointestinal: Bowel Sound present, soft, NT/ND  Extremities: No cyanosis or clubbing. No peripheral edema  Neurological: Alert and oriented x 0  : No CVA tenderness. No lincoln.   Skin: No rashes    Data:      160<HH>  |  126<H>  |  103<H>  ----------------------------<  138<H>  3.7   |  14<L>  |  3.79<H>    Ca    10.5      18 Sep 2020 16:00    TPro  6.7  /  Alb  3.5  /  TBili  0.4  /  DBili      /  AST  25  /  ALT  19  /  AlkPhos  74      Creatinine Trend: 3.79 <--, 2.81 <--                        12.5   14.46 )-----------( 242      ( 18 Sep 2020 12:49 )             41.6     Urine Studies:  Urinalysis Basic - ( 18 Sep 2020 12:54 )    Color: Yellow / Appearance: Clear / S.019 / pH:   Gluc:  / Ketone: Negative  / Bili: Small / Urobili: 2 mg/dL   Blood:  / Protein: 30 mg/dL / Nitrite: Negative   Leuk Esterase: Negative / RBC: 3 /hpf / WBC 2 /HPF   Sq Epi:  / Non Sq Epi: 1 /hpf / Bacteria: 0.0       Dr. Pate Dr. Pate Dr. Pate D Perlman

## 2023-10-06 NOTE — PHYSICAL THERAPY INITIAL EVALUATION ADULT - PERTINENT HX OF CURRENT PROBLEM, REHAB EVAL
Known Patient is 78 yo who was found on floor by EMS. Neighbor called because mail was piling up. Dog in house with feces all over. Unknown down time but seems long. Patient speaking but not in response to questions. Looks very dry. No obvious painful or deformed parts. Patient with hx of bipolar. CTH: Volume loss, microvascular disease, age indeterminate lacunar infarcts, no acute hemorrhage or midline shift.

## 2024-04-18 NOTE — PATIENT PROFILE ADULT - CAREGIVER OBTAIN DETAILS
Outreach attempt was made to schedule a Medicare Wellness Visit. This was the first attempt. Contact was made, [facility] appointment scheduled.    pt intubated